# Patient Record
Sex: FEMALE | Race: WHITE | NOT HISPANIC OR LATINO | Employment: OTHER | URBAN - METROPOLITAN AREA
[De-identification: names, ages, dates, MRNs, and addresses within clinical notes are randomized per-mention and may not be internally consistent; named-entity substitution may affect disease eponyms.]

---

## 2017-01-24 RX ORDER — PAROXETINE HYDROCHLORIDE HEMIHYDRATE 25 MG/1
25 TABLET, FILM COATED, EXTENDED RELEASE ORAL EVERY MORNING
Qty: 90 TABLET | Refills: 1 | Status: SHIPPED | OUTPATIENT
Start: 2017-01-24 | End: 2017-02-14 | Stop reason: ALTCHOICE

## 2017-01-24 NOTE — TELEPHONE ENCOUNTER
----- Message from Kelechi Varghese sent at 1/23/2017  1:33 PM CST -----  Contact: Self   Type: Rx    Name of medication(s): paroxetine (PAXIL-CR) 25 MG 24 hr tablet    Is this a refill? New rx? Refill    Who prescribed medication? Dr Carlos    Pharmacy Name, Phone, & Location: [facility]    Comments:Pt is requesting that you please give her the lower Dose, please advice    Thanks

## 2017-01-25 ENCOUNTER — TELEPHONE (OUTPATIENT)
Dept: INTERNAL MEDICINE | Facility: CLINIC | Age: 82
End: 2017-01-25

## 2017-01-25 RX ORDER — PAROXETINE 10 MG/1
10 TABLET, FILM COATED ORAL EVERY MORNING
Qty: 90 TABLET | Refills: 1 | Status: SHIPPED | OUTPATIENT
Start: 2017-01-25 | End: 2017-02-14 | Stop reason: SDUPTHER

## 2017-01-25 NOTE — TELEPHONE ENCOUNTER
----- Message from Tadeo Workmanam sent at 1/25/2017  1:54 PM CST -----  Contact: Self/[phone] cell  Patient is calling to speak with someone in the office regarding her current medications. She states she has been leaving messages for three days with no response. Please call and advise.    Thank you!

## 2017-02-14 ENCOUNTER — OFFICE VISIT (OUTPATIENT)
Dept: INTERNAL MEDICINE | Facility: CLINIC | Age: 82
End: 2017-02-14
Payer: MEDICARE

## 2017-02-14 VITALS
HEART RATE: 62 BPM | BODY MASS INDEX: 23.48 KG/M2 | HEIGHT: 63 IN | DIASTOLIC BLOOD PRESSURE: 74 MMHG | SYSTOLIC BLOOD PRESSURE: 126 MMHG | WEIGHT: 132.5 LBS

## 2017-02-14 DIAGNOSIS — E07.9 THYROID DISORDER: ICD-10-CM

## 2017-02-14 DIAGNOSIS — F41.1 GENERALIZED ANXIETY DISORDER: ICD-10-CM

## 2017-02-14 DIAGNOSIS — Z78.0 POSTMENOPAUSAL STATUS: ICD-10-CM

## 2017-02-14 DIAGNOSIS — Z00.00 ANNUAL PHYSICAL EXAM: Primary | ICD-10-CM

## 2017-02-14 DIAGNOSIS — Z12.31 ENCOUNTER FOR SCREENING MAMMOGRAM FOR MALIGNANT NEOPLASM OF BREAST: ICD-10-CM

## 2017-02-14 DIAGNOSIS — M81.0 OSTEOPOROSIS, POST-MENOPAUSAL: ICD-10-CM

## 2017-02-14 DIAGNOSIS — D64.9 ANEMIA, UNSPECIFIED TYPE: ICD-10-CM

## 2017-02-14 DIAGNOSIS — I10 ESSENTIAL HYPERTENSION: ICD-10-CM

## 2017-02-14 DIAGNOSIS — H25.813 COMBINED FORMS OF AGE-RELATED CATARACT OF BOTH EYES: ICD-10-CM

## 2017-02-14 PROBLEM — H25.819 COMBINED FORM OF SENILE CATARACT: Status: ACTIVE | Noted: 2017-02-14

## 2017-02-14 PROCEDURE — 99397 PER PM REEVAL EST PAT 65+ YR: CPT | Mod: S$GLB,,, | Performed by: INTERNAL MEDICINE

## 2017-02-14 PROCEDURE — 99999 PR PBB SHADOW E&M-EST. PATIENT-LVL III: CPT | Mod: PBBFAC,,, | Performed by: INTERNAL MEDICINE

## 2017-02-14 PROCEDURE — 3074F SYST BP LT 130 MM HG: CPT | Mod: S$GLB,,, | Performed by: INTERNAL MEDICINE

## 2017-02-14 PROCEDURE — 99499 UNLISTED E&M SERVICE: CPT | Mod: S$GLB,,, | Performed by: INTERNAL MEDICINE

## 2017-02-14 PROCEDURE — 3078F DIAST BP <80 MM HG: CPT | Mod: S$GLB,,, | Performed by: INTERNAL MEDICINE

## 2017-02-14 RX ORDER — PAROXETINE 10 MG/1
10 TABLET, FILM COATED ORAL EVERY MORNING
Qty: 90 TABLET | Refills: 1 | Status: SHIPPED | OUTPATIENT
Start: 2017-02-14 | End: 2018-01-09 | Stop reason: ALTCHOICE

## 2017-02-14 NOTE — MR AVS SNAPSHOT
[facility]  [address]  [place] [address]  Phone: [phone]  Fax: [phone]                  Courtney Plummer   2017 2:20 PM   Office Visit    Description:  Female : 1935   Provider:  Courtney Carlos MD   Department:  [facility]           Reason for Visit     Follow-up           Diagnoses this Visit        Comments    Annual physical exam    -  Primary     Generalized anxiety disorder         Essential hypertension         Osteoporosis, post-menopausal         Encounter for screening mammogram for malignant neoplasm of breast         Combined forms of age-related cataract of both eyes         Postmenopausal status         Anemia, unspecified type         Thyroid disorder                To Do List           Goals (5 Years of Data)     None      Follow-Up and Disposition     Return in about 6 months (around 2017) for also one year PE.       These Medications        Disp Refills Start End    paroxetine (PAXIL) 10 MG tablet 90 tablet 1 2017    Take 1 tablet (10 mg total) by mouth every morning. - Oral    Pharmacy: [facility] - [place], [place] - [address] [place] [facility] #: [phone]         [facility] Call     [facility] On Call Nurse Care Line -  Assistance  Registered nurses in the [facility] On Call Center provide clinical advisement, health education, appointment booking, and other advisory services.  Call for this free service at [phone].             Medications           Message regarding Medications     Verify the changes and/or additions to your medication regime listed below are the same as discussed with your clinician today.  If any of these changes or additions are incorrect, please notify your healthcare provider.        STOP taking these medications     paroxetine (PAXIL-CR) 25 MG 24 hr tablet Take 1 tablet (25 mg total) by mouth every morning.           Verify that the below list of medications is  "an accurate representation of the medications you are currently taking.  If none reported, the list may be blank. If incorrect, please contact your healthcare provider. Carry this list with you in case of emergency.           Current Medications     BIOTIN ORAL Take by mouth.    multivitamin (ONE DAILY MULTIVITAMIN) per tablet Take 1 tablet by mouth once daily. Takes 3x weekly    OMEGA-3S/DHA/EPA/FISH OIL/D3 (VITAMIN-D + OMEGA-3 ORAL) Take 1 tablet by mouth once daily.    paroxetine (PAXIL) 10 MG tablet Take 1 tablet (10 mg total) by mouth every morning.    potassium 99 mg Tab Take 1 tablet by mouth once daily.    triamterene-hydrochlorothiazide 37.5-25 mg (DYAZIDE) 37.5-25 mg per capsule Take 1 capsule by mouth once daily. 1 Capsule Oral Every morning    alprazolam (XANAX) 0.25 MG tablet Take 1 tablet (0.25 mg total) by mouth nightly as needed for Insomnia.    b complex vitamins tablet Take 1 tablet by mouth once daily.           Clinical Reference Information           Your Vitals Were     BP Pulse Height Weight BMI    126/74 62 5' 3" (1.6 m) 60.1 kg (132 lb 7.9 oz) 23.47 kg/m2      Blood Pressure          Most Recent Value    BP  126/74      Allergies as of 2/14/2017     Sulfa (Sulfonamide Antibiotics)    Codeine      Immunizations Administered on Date of Encounter - 2/14/2017     None      Orders Placed During Today's Visit      Normal Orders This Visit    Ambulatory referral to Ophthalmology     Future Labs/Procedures Expected by Expires    CBC auto differential  2/14/2017 2/14/2018    Comprehensive metabolic panel  2/14/2017 4/15/2018    DXA Bone Density Spine And Hip_Axial Skeleton  2/14/2017 5/15/2017    Lipid panel  2/14/2017 4/15/2018    Mammo Digital Screening Bilat with CAD  2/14/2017 4/16/2018    TSH  2/14/2017 4/15/2018      Language Assistance Services     ATTENTION: Language assistance services are available, free of charge. Please call [phone].      ATENCIÓN: leah Ngo " disposición servicios gratuitos de asistencia lingüística. Laineyahir al [phone].     SHAKIRA Ý: N?u b?n nói Ti?ng Vi?t, có các d?ch v? h? tr? ngôn ng? mi?n phí dành cho b?n. G?i s? [phone].         [facility] complies with applicable Federal civil rights laws and does not discriminate on the basis of race, color, national origin, age, disability, or sex.

## 2017-02-15 NOTE — PROGRESS NOTES
Subjective:       Patient ID: Courtney Plummer is a 81 y.o. female.    Chief Complaint: Follow-up  Wellness  Entire chart reviewed, PMx, FHx, and Social History updated and reviewed.    HPI  Review of Systems   Constitutional: Negative for activity change, appetite change, chills, fatigue, fever and unexpected weight change.   HENT: Negative for dental problem, hearing loss, tinnitus, trouble swallowing and voice change.    Eyes: Negative for visual disturbance.   Respiratory: Negative for cough, chest tightness, shortness of breath and wheezing.    Cardiovascular: Negative for chest pain, palpitations and leg swelling.   Gastrointestinal: Negative for abdominal pain, blood in stool, constipation, diarrhea and nausea.   Genitourinary: Negative for difficulty urinating, dysuria, frequency and urgency.   Musculoskeletal: Negative for arthralgias, back pain, gait problem, joint swelling, myalgias, neck pain and neck stiffness.   Skin: Negative for rash.   Neurological: Negative for dizziness, tremors, speech difficulty, weakness, light-headedness and headaches.   Psychiatric/Behavioral: Negative for dysphoric mood and sleep disturbance. The patient is not nervous/anxious.        Objective:      Physical Exam   Constitutional: She appears well-nourished.   HENT:   Head: Atraumatic.   Eyes: Conjunctivae are normal. No scleral icterus.   Neck: Neck supple.   Cardiovascular: Normal rate and regular rhythm.    Pulmonary/Chest: Effort normal and breath sounds normal.   Abdominal: Soft. There is no tenderness.   Musculoskeletal: She exhibits no edema.   Lymphadenopathy:     She has no cervical adenopathy.   Neurological: She is alert.   Skin: Skin is warm and dry.   Psychiatric: She has a normal mood and affect. Her behavior is normal.       Assessment:       1. Annual physical exam    2. Generalized anxiety disorder    3. Essential hypertension    4. Osteoporosis, post-menopausal    5. Encounter for screening mammogram for  malignant neoplasm of breast    6. Combined forms of age-related cataract of both eyes    7. Postmenopausal status    8. Anemia, unspecified type    9. Thyroid disorder, nodule removed 1969.        Plan:   Courtney was seen today for follow-up.    Diagnoses and all orders for this visit:    Annual physical exam    Generalized anxiety disorder.  Doing well on Paxil 10 mg once daily.    Essential hypertension  Good control.  Continue to monitor.  -     Comprehensive metabolic panel; Future  -     Lipid panel; Future    Osteoporosis, post-menopausal  -     DXA Bone Density Spine And Hip_Axial Skeleton; Future    Encounter for screening mammogram for malignant neoplasm of breast  -     Mammo Digital Screening Bilat with CAD; Future    Combined forms of age-related cataract of both eyes.  She was told by her optometrist that it is time to have cataract surgery.  She has cataracts in both eyes that are impairing her vision.  -     Ambulatory referral to Ophthalmology    Postmenopausal status  -     DXA Bone Density Spine And Hip_Axial Skeleton; Future    Anemia, unspecified type  -     CBC auto differential; Future    Thyroid disorder, nodule removed 1969.  -     TSH; Future    Other orders.  She is going to try to get some regular physical activity into her life.  She has not been doing any regular exercise.  -     paroxetine (PAXIL) 10 MG tablet; Take 1 tablet (10 mg total) by mouth every morning.

## 2017-02-17 ENCOUNTER — HOSPITAL ENCOUNTER (OUTPATIENT)
Dept: RADIOLOGY | Facility: HOSPITAL | Age: 82
Discharge: HOME | End: 2017-02-17
Attending: INTERNAL MEDICINE
Payer: MEDICARE

## 2017-02-17 DIAGNOSIS — Z12.31 ENCOUNTER FOR SCREENING MAMMOGRAM FOR MALIGNANT NEOPLASM OF BREAST: ICD-10-CM

## 2017-02-17 PROCEDURE — 77067 SCR MAMMO BI INCL CAD: CPT | Mod: 26,,, | Performed by: RADIOLOGY

## 2017-02-17 PROCEDURE — 77067 SCR MAMMO BI INCL CAD: CPT | Mod: TC

## 2017-03-14 ENCOUNTER — HOSPITAL ENCOUNTER (OUTPATIENT)
Dept: RADIOLOGY | Facility: CLINIC | Age: 82
Discharge: HOME | End: 2017-03-14
Attending: INTERNAL MEDICINE
Payer: MEDICARE

## 2017-03-14 DIAGNOSIS — Z78.0 POSTMENOPAUSAL STATUS: ICD-10-CM

## 2017-03-14 DIAGNOSIS — M81.0 OSTEOPOROSIS, POST-MENOPAUSAL: ICD-10-CM

## 2017-03-14 PROCEDURE — 77080 DXA BONE DENSITY AXIAL: CPT | Mod: TC

## 2017-03-14 PROCEDURE — 77080 DXA BONE DENSITY AXIAL: CPT | Mod: 26,,, | Performed by: INTERNAL MEDICINE

## 2017-03-17 ENCOUNTER — TELEPHONE (OUTPATIENT)
Dept: INTERNAL MEDICINE | Facility: CLINIC | Age: 82
End: 2017-03-17

## 2017-03-17 NOTE — TELEPHONE ENCOUNTER
Please let the patient know that you faxed her pre-op for cataract surgery. Thank you very much.

## 2017-10-02 ENCOUNTER — OFFICE VISIT (OUTPATIENT)
Dept: URGENT CARE | Facility: CLINIC | Age: 82
End: 2017-10-02
Payer: MEDICARE

## 2017-10-02 VITALS
HEIGHT: 63 IN | BODY MASS INDEX: 23.39 KG/M2 | SYSTOLIC BLOOD PRESSURE: 159 MMHG | RESPIRATION RATE: 18 BRPM | TEMPERATURE: 99 F | OXYGEN SATURATION: 97 % | WEIGHT: 132 LBS | DIASTOLIC BLOOD PRESSURE: 77 MMHG | HEART RATE: 84 BPM

## 2017-10-02 DIAGNOSIS — J01.90 ACUTE BACTERIAL SINUSITIS: Primary | ICD-10-CM

## 2017-10-02 DIAGNOSIS — B96.89 ACUTE BACTERIAL SINUSITIS: Primary | ICD-10-CM

## 2017-10-02 PROCEDURE — 96372 THER/PROPH/DIAG INJ SC/IM: CPT | Mod: S$GLB,,, | Performed by: INTERNAL MEDICINE

## 2017-10-02 PROCEDURE — 99213 OFFICE O/P EST LOW 20 MIN: CPT | Mod: 25,S$GLB,, | Performed by: INTERNAL MEDICINE

## 2017-10-02 PROCEDURE — 1159F MED LIST DOCD IN RCRD: CPT | Mod: S$GLB,,, | Performed by: INTERNAL MEDICINE

## 2017-10-02 PROCEDURE — 3008F BODY MASS INDEX DOCD: CPT | Mod: S$GLB,,, | Performed by: INTERNAL MEDICINE

## 2017-10-02 PROCEDURE — 3077F SYST BP >= 140 MM HG: CPT | Mod: S$GLB,,, | Performed by: INTERNAL MEDICINE

## 2017-10-02 PROCEDURE — 3078F DIAST BP <80 MM HG: CPT | Mod: S$GLB,,, | Performed by: INTERNAL MEDICINE

## 2017-10-02 RX ORDER — AZITHROMYCIN 250 MG/1
TABLET, FILM COATED ORAL
Qty: 6 TABLET | Refills: 0 | Status: SHIPPED | OUTPATIENT
Start: 2017-10-02 | End: 2017-10-08

## 2017-10-02 RX ORDER — BETAMETHASONE SODIUM PHOSPHATE AND BETAMETHASONE ACETATE 3; 3 MG/ML; MG/ML
9 INJECTION, SUSPENSION INTRA-ARTICULAR; INTRALESIONAL; INTRAMUSCULAR; SOFT TISSUE ONCE
Status: COMPLETED | OUTPATIENT
Start: 2017-10-02 | End: 2017-10-02

## 2017-10-02 RX ADMIN — BETAMETHASONE SODIUM PHOSPHATE AND BETAMETHASONE ACETATE 9 MG: 3; 3 INJECTION, SUSPENSION INTRA-ARTICULAR; INTRALESIONAL; INTRAMUSCULAR; SOFT TISSUE at 03:10

## 2017-10-02 NOTE — PROGRESS NOTES
"Subjective:       Patient ID: Courtney Plummer is a 82 y.o. female.    Vitals:  height is 5' 3" (1.6 m) and weight is 59.9 kg (132 lb). Her temperature is 98.7 °F (37.1 °C). Her blood pressure is 159/77 (abnormal) and her pulse is 84. Her respiration is 18 and oxygen saturation is 97%.     Chief Complaint: Cough and Sinus Problem    Cough   This is a new problem. The current episode started in the past 7 days. The problem has been gradually worsening. The problem occurs every few minutes. The cough is productive of sputum. Associated symptoms include myalgias, nasal congestion, postnasal drip and a sore throat. Pertinent negatives include no chest pain, chills, ear pain, eye redness, fever, headaches, shortness of breath or wheezing. Nothing aggravates the symptoms. She has tried OTC cough suppressant for the symptoms. The treatment provided mild relief.   Sinus Problem   This is a new problem. The current episode started in the past 7 days. The problem has been gradually worsening since onset. There has been no fever. The pain is mild. Associated symptoms include congestion, coughing, sinus pressure, sneezing and a sore throat. Pertinent negatives include no chills, ear pain, headaches, hoarse voice or shortness of breath. Past treatments include nothing.     Review of Systems   Constitution: Positive for malaise/fatigue. Negative for chills and fever.   HENT: Positive for congestion, postnasal drip, sinus pressure, sneezing and sore throat. Negative for ear pain and hoarse voice.    Eyes: Negative for discharge and redness.   Cardiovascular: Negative for chest pain, dyspnea on exertion and leg swelling.   Respiratory: Positive for cough and sputum production. Negative for shortness of breath and wheezing.    Musculoskeletal: Positive for myalgias.   Gastrointestinal: Negative for abdominal pain and nausea.   Neurological: Negative for headaches.       Objective:      Physical Exam   Constitutional: She is " oriented to person, place, and time. She appears well-developed and well-nourished.   HENT:   Head: Normocephalic and atraumatic.   Nose: Mucosal edema (mucosal erythema with purulent drainage) present.   Mouth/Throat: Posterior oropharyngeal edema and posterior oropharyngeal erythema present.   Eyes: Conjunctivae and EOM are normal. Pupils are equal, round, and reactive to light.   Neck: Normal range of motion. Neck supple.   Cardiovascular: Normal rate and regular rhythm.    Pulmonary/Chest: Effort normal.   Upper airway congestion   Neurological: She is alert and oriented to person, place, and time.   Skin: Skin is warm and dry.       Assessment:       1. Acute bacterial sinusitis        Plan:         Acute bacterial sinusitis  -     betamethasone acetate-betamethasone sodium phosphate injection 9 mg; Inject 1.5 mLs (9 mg total) into the muscle once.  -     azithromycin (ZITHROMAX Z-PHILIPPE) 250 MG tablet; Take 2 tablets (500 mg) on  Day 1,  followed by 1 tablet (250 mg) once daily on Days 2 through 5.  Dispense: 6 tablet; Refill: 0

## 2017-10-08 ENCOUNTER — OFFICE VISIT (OUTPATIENT)
Dept: URGENT CARE | Facility: CLINIC | Age: 82
End: 2017-10-08
Payer: MEDICARE

## 2017-10-08 ENCOUNTER — HOSPITAL ENCOUNTER (EMERGENCY)
Facility: HOSPITAL | Age: 82
Discharge: HOME | End: 2017-10-08
Attending: EMERGENCY MEDICINE
Payer: MEDICARE

## 2017-10-08 VITALS
SYSTOLIC BLOOD PRESSURE: 145 MMHG | WEIGHT: 128 LBS | TEMPERATURE: 98 F | OXYGEN SATURATION: 97 % | HEART RATE: 80 BPM | RESPIRATION RATE: 18 BRPM | BODY MASS INDEX: 22.68 KG/M2 | HEIGHT: 63 IN | DIASTOLIC BLOOD PRESSURE: 69 MMHG

## 2017-10-08 VITALS
HEART RATE: 86 BPM | DIASTOLIC BLOOD PRESSURE: 68 MMHG | SYSTOLIC BLOOD PRESSURE: 121 MMHG | HEIGHT: 63 IN | OXYGEN SATURATION: 98 % | BODY MASS INDEX: 22.68 KG/M2 | WEIGHT: 128 LBS | TEMPERATURE: 98 F

## 2017-10-08 DIAGNOSIS — J06.9 URI (UPPER RESPIRATORY INFECTION): ICD-10-CM

## 2017-10-08 DIAGNOSIS — E87.6 HYPOKALEMIA: Primary | ICD-10-CM

## 2017-10-08 DIAGNOSIS — R40.20 LOSS OF CONSCIOUSNESS: ICD-10-CM

## 2017-10-08 DIAGNOSIS — R55 SYNCOPE: ICD-10-CM

## 2017-10-08 DIAGNOSIS — R53.1 WEAKNESS: ICD-10-CM

## 2017-10-08 DIAGNOSIS — R55 SYNCOPE, UNSPECIFIED SYNCOPE TYPE: Primary | ICD-10-CM

## 2017-10-08 LAB
ALBUMIN SERPL BCP-MCNC: 3.9 G/DL
ALP SERPL-CCNC: 62 U/L
ALT SERPL W/O P-5'-P-CCNC: 18 U/L
ANION GAP SERPL CALC-SCNC: 11 MMOL/L
AST SERPL-CCNC: 19 U/L
BASOPHILS # BLD AUTO: 0.02 K/UL
BASOPHILS NFR BLD: 0.2 %
BILIRUB SERPL-MCNC: 0.7 MG/DL
BILIRUB UR QL STRIP: NEGATIVE
BUN SERPL-MCNC: 13 MG/DL
CALCIUM SERPL-MCNC: 9.7 MG/DL
CHLORIDE SERPL-SCNC: 94 MMOL/L
CLARITY UR REFRACT.AUTO: ABNORMAL
CO2 SERPL-SCNC: 26 MMOL/L
COLOR UR AUTO: YELLOW
CREAT SERPL-MCNC: 0.8 MG/DL
DIFFERENTIAL METHOD: ABNORMAL
EOSINOPHIL # BLD AUTO: 0.1 K/UL
EOSINOPHIL NFR BLD: 1.2 %
ERYTHROCYTE [DISTWIDTH] IN BLOOD BY AUTOMATED COUNT: 16.2 %
EST. GFR  (AFRICAN AMERICAN): >60 ML/MIN/1.73 M^2
EST. GFR  (NON AFRICAN AMERICAN): >60 ML/MIN/1.73 M^2
GLUCOSE SERPL-MCNC: 97 MG/DL
GLUCOSE UR QL STRIP: NEGATIVE
HCT VFR BLD AUTO: 37.1 %
HGB BLD-MCNC: 12.7 G/DL
HGB UR QL STRIP: NEGATIVE
KETONES UR QL STRIP: NEGATIVE
LEUKOCYTE ESTERASE UR QL STRIP: NEGATIVE
LIPASE SERPL-CCNC: 20 U/L
LYMPHOCYTES # BLD AUTO: 2.6 K/UL
LYMPHOCYTES NFR BLD: 32.6 %
MCH RBC QN AUTO: 28.5 PG
MCHC RBC AUTO-ENTMCNC: 34.2 G/DL
MCV RBC AUTO: 83 FL
MONOCYTES # BLD AUTO: 1.3 K/UL
MONOCYTES NFR BLD: 15.6 %
NEUTROPHILS # BLD AUTO: 4 K/UL
NEUTROPHILS NFR BLD: 50.4 %
NITRITE UR QL STRIP: NEGATIVE
PH UR STRIP: 8 [PH] (ref 5–8)
PLATELET # BLD AUTO: 221 K/UL
PMV BLD AUTO: 10 FL
POTASSIUM SERPL-SCNC: 2.9 MMOL/L
PROT SERPL-MCNC: 6.8 G/DL
PROT UR QL STRIP: NEGATIVE
RBC # BLD AUTO: 4.46 M/UL
SODIUM SERPL-SCNC: 131 MMOL/L
SP GR UR STRIP: 1.01 (ref 1–1.03)
URN SPEC COLLECT METH UR: ABNORMAL
UROBILINOGEN UR STRIP-ACNC: NEGATIVE EU/DL
WBC # BLD AUTO: 8.09 K/UL

## 2017-10-08 PROCEDURE — 25000003 PHARM REV CODE 250: Performed by: EMERGENCY MEDICINE

## 2017-10-08 PROCEDURE — 85025 COMPLETE CBC W/AUTO DIFF WBC: CPT

## 2017-10-08 PROCEDURE — 99284 EMERGENCY DEPT VISIT MOD MDM: CPT | Mod: 25

## 2017-10-08 PROCEDURE — 80053 COMPREHEN METABOLIC PANEL: CPT

## 2017-10-08 PROCEDURE — 96360 HYDRATION IV INFUSION INIT: CPT

## 2017-10-08 PROCEDURE — 93010 ELECTROCARDIOGRAM REPORT: CPT | Mod: ,,, | Performed by: INTERNAL MEDICINE

## 2017-10-08 PROCEDURE — 99285 EMERGENCY DEPT VISIT HI MDM: CPT | Mod: ,,, | Performed by: EMERGENCY MEDICINE

## 2017-10-08 PROCEDURE — 93005 ELECTROCARDIOGRAM TRACING: CPT

## 2017-10-08 PROCEDURE — 83690 ASSAY OF LIPASE: CPT

## 2017-10-08 PROCEDURE — 81003 URINALYSIS AUTO W/O SCOPE: CPT

## 2017-10-08 PROCEDURE — 99213 OFFICE O/P EST LOW 20 MIN: CPT | Mod: S$GLB,,, | Performed by: INTERNAL MEDICINE

## 2017-10-08 RX ADMIN — POTASSIUM BICARBONATE 50 MEQ: 25 TABLET, EFFERVESCENT ORAL at 05:10

## 2017-10-08 RX ADMIN — SODIUM CHLORIDE 1000 ML: 0.9 INJECTION, SOLUTION INTRAVENOUS at 04:10

## 2017-10-08 NOTE — ED TRIAGE NOTES
Comes to the ED for c/o chest congestion for 1 week.  Was given and ABX.    Now with nasal congestion, decreased appetite, nausea. Productive cough,  Diarrhea. And pass out while in the bathroom.  Did not hit her head.  Reported being disoriented this am.

## 2017-10-08 NOTE — ED PROVIDER NOTES
Encounter Date: 10/8/2017    SCRIBE #1 NOTE: I, Jayjay Kilgore, am scribing for, and in the presence of, Richard Noguera MD.       History     Chief Complaint   Patient presents with    Chest Congestion     sent from urgent care for chest congestion and further work up. pt reports decreased appetite and weakness. states she passed out last night on the toilet     Time seen by provider: 4:12 PM    This is a 82 y.o. female with history of hypertension on Dyazide, anxiety, who presents with complaint of chest congestion x 1 week. Patient notes Urgent Care visit 1 week ago for chest congestion and sore throat and being given steroid injection, Mucinex, and Azithromycin. She states her symptoms resolved by the next day, finishing her course of antibiotics 4 days ago, but had resumption of chest congestion 3 days ago. Patient also complains of nausea, diarrhea, anorexia that started 3 days ago. She reports an episode of diarrhea last night after which she felt suddenly weak and synopsized and thinks that she hurt her right ankle. Patient denies head injury. She says that she then got up a few seconds afterwards, laid on the sofa for a few minutes, woke up again feeling disoriented and then went back to sleep in her bed upstairs. She returned to Urgent Care earlier today and was sent to the ED for further evaluation.    Patient states that she has otherwise been sleeping well. She denies fever, chills, chest pain, SOB. Additionally patient mentions taking 10 mg Paxil QD, history of multiple back surgeries, history of neuropathy (baseline paresthesias from toes up to waist bilaterally and stable for the past several months) for which she is not medicated.           Review of patient's allergies indicates:   Allergen Reactions    Sulfa (sulfonamide antibiotics) Anaphylaxis    Codeine Anxiety     Past Medical History:   Diagnosis Date    Anxiety     Hypertension      Past Surgical History:   Procedure Laterality Date    BACK  SURGERY      x3     SECTION, CLASSIC      SPINE SURGERY      THYROID SURGERY       Family History   Problem Relation Age of Onset    Heart disease Father     Heart disease Brother     Cancer Brother      melanoma    No Known Problems Daughter     No Known Problems Son     No Known Problems Daughter     No Known Problems Daughter     No Known Problems Son     No Known Problems Son      Social History   Substance Use Topics    Smoking status: Former Smoker     Packs/day: 1.00     Quit date: 1969    Smokeless tobacco: Never Used    Alcohol use No      Comment: occasionally     Review of Systems   Constitutional: Positive for appetite change and fatigue. Negative for chills and fever.   HENT: Negative for sore throat.    Respiratory: Negative for cough and shortness of breath.         Positive for chest congestion.    Cardiovascular: Negative for chest pain.   Gastrointestinal: Positive for diarrhea and nausea.   Genitourinary: Negative for dysuria.   Musculoskeletal: Negative for back pain.   Skin: Negative for rash.   Neurological: Positive for syncope. Negative for weakness.   Hematological: Does not bruise/bleed easily.       Physical Exam     Initial Vitals [10/08/17 1454]   BP Pulse Resp Temp SpO2   (!) 145/69 80 18 98.3 °F (36.8 °C) 97 %      MAP       94.33         Physical Exam    Nursing note and vitals reviewed.  Constitutional: She appears well-developed and well-nourished.  Non-toxic appearance. She does not appear ill. No distress.   Patient appears slightly tired.    HENT:   Head: Normocephalic.   Mouth/Throat: Oropharynx is clear and moist.   Eyes: Conjunctivae are normal.   Neck: Neck supple.   Cardiovascular: Normal rate, regular rhythm and intact distal pulses.   Pulmonary/Chest: Breath sounds normal. No respiratory distress. She has no wheezes. She has no rhonchi. She has no rales.   Abdominal: Soft. There is no tenderness.   Musculoskeletal: Normal range of motion.    Neurological: She is alert and oriented to person, place, and time. She has normal strength.   Skin: Skin is warm and dry.   Psychiatric: Thought content normal.         ED Course   Procedures  Labs Reviewed   CBC W/ AUTO DIFFERENTIAL - Abnormal; Notable for the following:        Result Value    RDW 16.2 (*)     Mono # 1.3 (*)     Mono% 15.6 (*)     All other components within normal limits   COMPREHENSIVE METABOLIC PANEL - Abnormal; Notable for the following:     Sodium 131 (*)     Potassium 2.9 (*)     Chloride 94 (*)     All other components within normal limits   URINALYSIS, REFLEX TO URINE CULTURE - Abnormal; Notable for the following:     Appearance, UA Hazy (*)     All other components within normal limits    Narrative:     Preferred Collection Type->Urine, Clean Catch   LIPASE             Medical Decision Making:   History:   Old Medical Records: I decided to obtain old medical records.  Initial Assessment:   82 y.o. female with URI symptoms that began 1 week ago and briefly resolved but have returned. She also complains of stomach issues with anorexia and diarrhea that began shortly after completing her course of Azithromax. Last night around bed time, she had an episode of diarrhea and afterwards felt suddenly weak and passed out.Regarding her syncope, she is almost 24 hours from the event, therefore, I do not feel observation for cardiac syncope is warranted especially given history of occurrence after diarrhea which suggests reflex syncope compounded by dehydration.   Patient is also complaining of chest congestion which antibiotics have not really helped. Will give fluids, and check labs and chest x-ray. If that all looks good, then will anticipate discharge.     I have noted the Urgent Care doctor's note from today, who felt that the patient looked ill; but I disagree with his assessment.   Clinical Tests:   Medical Tests: Reviewed and Ordered            Scribe Attestation:   Scribe #1: I performed  the above scribed service and the documentation accurately describes the services I performed. I attest to the accuracy of the note.            ED Course      Clinical Impression:   The primary encounter diagnosis was Hypokalemia. Diagnoses of Loss of consciousness, Syncope, and URI (upper respiratory infection) were also pertinent to this visit.                           Richard Noguera MD  10/10/17 6161

## 2017-10-08 NOTE — ED NOTES
LOC: The patient is awake, alert, and oriented to place, time, situation. Affect is appropriate.  Speech is appropriate and clear.     APPEARANCE: Patient resting comfortably in no acute distress.  Patient is clean and well groomed.    SKIN: The skin is warm and dry; color consistent with ethnicity.  Patient has normal skin turgor and moist mucus membranes.  Skin intact; no breakdown or bruising noted.     MUSCULOSKELETAL: Patient moving upper and lower extremities without difficulty. Reports feeling weak.    RESPIRATORY: Airway is open and patent. Respirations spontaneous, even, easy, and non-labored.  Patient has a normal effort and rate.  No accessory muscle use noted. Productive  Cough, yellow.BS clear.     CARDIAC: .  No peripheral edema noted. No complaints of chest pain.      ABDOMEN: Soft and non tender to palpation.  No distention noted. Report nausea with eating, decreased appetite. And diarrhea.    NEUROLOGIC: Eyes open spontaneously.  Behavior appropriate to situation.  Follows commands; facial expression symmetrical.  Purposeful motor response noted; normal sensation in all extremities.

## 2017-10-08 NOTE — PROGRESS NOTES
"Subjective:       Patient ID: Courtney Plummer is a 82 y.o. female.    Vitals:  height is 5' 3" (1.6 m) and weight is 58.1 kg (128 lb). Her temperature is 98.4 °F (36.9 °C). Her blood pressure is 121/68 and her pulse is 86. Her oxygen saturation is 98%.     Chief Complaint: Cough    Cough   This is a recurrent (congestion) problem. The current episode started in the past 7 days. The problem has been gradually worsening. The problem occurs constantly. Associated symptoms comments: Chest congestion. The symptoms are aggravated by lying down.     Review of Systems   Respiratory: Positive for cough.    Gastrointestinal: Positive for abdominal pain and diarrhea.        Patient passed out on the toilet last night. Not sure what she hit. Rt. Ankle tenderness.        Objective:      Physical Exam   Constitutional: She is oriented to person, place, and time. She appears well-developed and well-nourished. She appears listless. She has a sickly appearance. She appears ill.   HENT:   Head: Normocephalic and atraumatic.   Eyes: Conjunctivae and EOM are normal. Pupils are equal, round, and reactive to light.   Neck: Normal range of motion. Neck supple.   Cardiovascular: Normal rate and regular rhythm.    Pulmonary/Chest: Effort normal and breath sounds normal.   Abdominal: Soft. Bowel sounds are normal.   Musculoskeletal: Normal range of motion.   Neurological: She is oriented to person, place, and time. She appears listless.   Skin: Skin is warm and dry.       Assessment:       1. Syncope, unspecified syncope type    2. Weakness        Plan:         Syncope, unspecified syncope type    Weakness    advised needs to go to Er. Declined to have family or friend come get her. Declined ambulance.stated she would go home near here and call kids to bring her to   ER      "

## 2018-01-08 PROBLEM — R55 SYNCOPE: Status: ACTIVE | Noted: 2018-01-08

## 2018-01-08 PROBLEM — E87.1 HYPONATREMIA: Status: ACTIVE | Noted: 2018-01-08

## 2018-01-08 PROBLEM — I10 ESSENTIAL HYPERTENSION: Status: ACTIVE | Noted: 2018-01-08

## 2018-01-09 ENCOUNTER — OFFICE VISIT (OUTPATIENT)
Dept: INTERNAL MEDICINE | Facility: CLINIC | Age: 83
End: 2018-01-09
Payer: MEDICARE

## 2018-01-09 VITALS
BODY MASS INDEX: 24.14 KG/M2 | HEIGHT: 62 IN | DIASTOLIC BLOOD PRESSURE: 72 MMHG | WEIGHT: 131.19 LBS | SYSTOLIC BLOOD PRESSURE: 134 MMHG | HEART RATE: 74 BPM

## 2018-01-09 DIAGNOSIS — M25.552 LEFT HIP PAIN: ICD-10-CM

## 2018-01-09 DIAGNOSIS — Z23 NEEDS FLU SHOT: ICD-10-CM

## 2018-01-09 DIAGNOSIS — Z01.419 ENCOUNTER FOR GYNECOLOGICAL EXAMINATION WITHOUT ABNORMAL FINDING: ICD-10-CM

## 2018-01-09 DIAGNOSIS — I10 ESSENTIAL HYPERTENSION: ICD-10-CM

## 2018-01-09 DIAGNOSIS — F41.1 GENERALIZED ANXIETY DISORDER: ICD-10-CM

## 2018-01-09 DIAGNOSIS — R55 SYNCOPE, UNSPECIFIED SYNCOPE TYPE: ICD-10-CM

## 2018-01-09 DIAGNOSIS — K80.20 ASYMPTOMATIC GALLSTONES: ICD-10-CM

## 2018-01-09 DIAGNOSIS — Z87.81 HISTORY OF VERTEBRAL COMPRESSION FRACTURE: ICD-10-CM

## 2018-01-09 DIAGNOSIS — Z23 NEED FOR VACCINATION AGAINST STREPTOCOCCUS PNEUMONIAE USING PNEUMOCOCCAL CONJUGATE VACCINE 13: ICD-10-CM

## 2018-01-09 DIAGNOSIS — Z00.00 ANNUAL PHYSICAL EXAM: Primary | ICD-10-CM

## 2018-01-09 DIAGNOSIS — E07.9 THYROID DISORDER: ICD-10-CM

## 2018-01-09 DIAGNOSIS — M81.0 OSTEOPOROSIS, POST-MENOPAUSAL: ICD-10-CM

## 2018-01-09 DIAGNOSIS — N20.0 KIDNEY STONES: ICD-10-CM

## 2018-01-09 DIAGNOSIS — Z23 NEED FOR VACCINATION WITH 13-POLYVALENT PNEUMOCOCCAL CONJUGATE VACCINE: ICD-10-CM

## 2018-01-09 DIAGNOSIS — F41.0 PANIC ATTACKS: ICD-10-CM

## 2018-01-09 DIAGNOSIS — E87.1 HYPONATREMIA: ICD-10-CM

## 2018-01-09 PROCEDURE — 99397 PER PM REEVAL EST PAT 65+ YR: CPT | Mod: S$GLB,,, | Performed by: INTERNAL MEDICINE

## 2018-01-09 PROCEDURE — 90670 PCV13 VACCINE IM: CPT | Mod: S$GLB,,, | Performed by: INTERNAL MEDICINE

## 2018-01-09 PROCEDURE — 99999 PR PBB SHADOW E&M-EST. PATIENT-LVL IV: CPT | Mod: PBBFAC,,, | Performed by: INTERNAL MEDICINE

## 2018-01-09 PROCEDURE — G0009 ADMIN PNEUMOCOCCAL VACCINE: HCPCS | Mod: S$GLB,,, | Performed by: INTERNAL MEDICINE

## 2018-01-09 PROCEDURE — 99499 UNLISTED E&M SERVICE: CPT | Mod: S$GLB,,, | Performed by: INTERNAL MEDICINE

## 2018-01-09 RX ORDER — TRIAMTERENE AND HYDROCHLOROTHIAZIDE 37.5; 25 MG/1; MG/1
1 CAPSULE ORAL DAILY
Qty: 90 CAPSULE | Refills: 4 | Status: SHIPPED | OUTPATIENT
Start: 2018-01-09 | End: 2019-02-20 | Stop reason: SDUPTHER

## 2018-01-09 RX ORDER — ESCITALOPRAM OXALATE 5 MG/1
5 TABLET ORAL EVERY MORNING
Qty: 90 TABLET | Refills: 3 | Status: SHIPPED | OUTPATIENT
Start: 2018-01-09 | End: 2018-08-27

## 2018-01-14 NOTE — PROGRESS NOTES
Subjective:       Patient ID: Courtney Plummer is a 82 y.o. female.    Chief Complaint: Hospital Follow Up  wellness  Entire chart reviewed, PMx, FHx, and Social History updated and reviewed.  Dating ex  Cautiously optimistic about better relationship  Reconciliation may be too far  Happy and safe at [address] in her own townhouse  HPI  Review of Systems   Constitutional: Negative for activity change, appetite change, chills, fatigue, fever and unexpected weight change.   HENT: Positive for hearing loss. Negative for dental problem, rhinorrhea, tinnitus, trouble swallowing and voice change.    Eyes: Positive for visual disturbance. Negative for discharge.   Respiratory: Negative for cough, chest tightness, shortness of breath and wheezing.    Cardiovascular: Negative for chest pain, palpitations and leg swelling.   Gastrointestinal: Negative for abdominal pain, blood in stool, constipation, diarrhea, nausea and vomiting.   Endocrine: Negative for polydipsia and polyuria.   Genitourinary: Negative for difficulty urinating, dysuria, frequency, hematuria, menstrual problem and urgency.   Musculoskeletal: Negative for arthralgias, back pain, gait problem, joint swelling, myalgias, neck pain and neck stiffness.   Skin: Negative for rash.   Neurological: Negative for dizziness, tremors, speech difficulty, weakness, light-headedness and headaches.   Psychiatric/Behavioral: Negative for confusion, dysphoric mood and sleep disturbance. The patient is not nervous/anxious.        Objective:      Physical Exam   Constitutional: She is oriented to person, place, and time. She appears well-developed and well-nourished. No distress.   HENT:   Head: Normocephalic and atraumatic.   Right Ear: External ear normal.   Left Ear: External ear normal.   Nose: Nose normal.   Mouth/Throat: Oropharynx is clear and moist. No oropharyngeal exudate.   Eyes: Conjunctivae and EOM are normal. Pupils are equal, round, and reactive  to light. Right eye exhibits no discharge. No scleral icterus.   Neck: Normal range of motion and full passive range of motion without pain. Neck supple. No spinous process tenderness and no muscular tenderness present. Carotid bruit is not present. No thyromegaly present.   Cardiovascular: Normal rate, regular rhythm, S1 normal, S2 normal, normal heart sounds and intact distal pulses.  Exam reveals no gallop and no friction rub.    No murmur heard.  Pulmonary/Chest: Effort normal and breath sounds normal. No respiratory distress. She has no wheezes. She has no rales. She exhibits no tenderness.   Abdominal: Soft. Bowel sounds are normal. She exhibits no distension and no mass. There is no tenderness. There is no rebound and no guarding.   Genitourinary: Pelvic exam was performed with patient supine. Uterus is not deviated, not enlarged, not fixed and not tender. Cervix exhibits no motion tenderness, no discharge and no friability. Right adnexum displays no mass, no tenderness and no fullness. Left adnexum displays no mass, no tenderness and no fullness.   Musculoskeletal: Normal range of motion. She exhibits no edema or tenderness.   Lymphadenopathy:        Head (right side): No submental and no submandibular adenopathy present.        Head (left side): No submental and no submandibular adenopathy present.     She has no cervical adenopathy.        Right cervical: No superficial cervical, no deep cervical and no posterior cervical adenopathy present.       Left cervical: No superficial cervical, no deep cervical and no posterior cervical adenopathy present.        Right axillary: No pectoral and no lateral adenopathy present.        Left axillary: No pectoral and no lateral adenopathy present.       Right: No supraclavicular adenopathy present.        Left: No supraclavicular adenopathy present.   Neurological: She is alert and oriented to person, place, and time. She has normal reflexes. No cranial nerve deficit.  She exhibits normal muscle tone. Coordination normal.   Skin: Skin is warm and dry. No rash noted.   Psychiatric: She has a normal mood and affect. Her behavior is normal. Her mood appears not anxious. Her speech is not rapid and/or pressured. She is not agitated. She does not exhibit a depressed mood.   Normal behavior, thought content, insight and judgement.       Assessment:       1. Annual physical exam    2. Hyponatremia    3. Essential hypertension    4. Syncope, unspecified syncope type    5. Generalized anxiety disorder    6. Thyroid disorder, nodule removed 1969.    7. Panic attacks    8. Asymptomatic gallstones    9. History of vertebral compression fracture    10. Osteoporosis, post-menopausal    11. Kidney stones    12. Needs flu shot    13. Need for vaccination against Streptococcus pneumoniae using pneumococcal conjugate vaccine 13    14. Encounter for gynecological examination without abnormal finding    15. Left hip pain    16. Need for vaccination with 13-polyvalent pneumococcal conjugate vaccine        Plan:   Courtney was seen today for hospital follow up.    Diagnoses and all orders for this visit:    Annual physical exam    Hyponatremia  -     Comprehensive metabolic panel; Future    Essential hypertension  -     Comprehensive metabolic panel; Future  -     Uric acid; Future  -     Lipid panel; Future    Syncope, unspecified syncope type, trial taper off SSRI    Generalized anxiety disorder    Thyroid disorder, nodule removed 1969.  -     CBC auto differential; Future  -     TSH; Future    Panic attacks, none recently    Asymptomatic gallstones, monitor  -     CBC auto differential; Future    History of vertebral compression fracture, no pain    Osteoporosis, post-menopausal  -     TSH; Future  -     Vitamin D; Future    Kidney stones, moniotr sxs    Needs flu shot    Need for vaccination against Streptococcus pneumoniae using pneumococcal conjugate vaccine 13    Encounter for gynecological  examination without abnormal finding  -     Ambulatory Referral to Obstetrics / Gynecology    Left hip pain  -     X-Ray Hips Bilateral 2 View Incl AP Pelvis; Future    Need for vaccination with 13-polyvalent pneumococcal conjugate vaccine    Other orders  -     escitalopram oxalate (LEXAPRO) 5 MG Tab; Take 1 tablet (5 mg total) by mouth every morning.  -     triamterene-hydrochlorothiazide 37.5-25 mg (DYAZIDE) 37.5-25 mg per capsule; Take 1 capsule by mouth once daily. 1 Capsule Oral Every morning  -     (In Office Administered) Pneumococcal Conjugate Vaccine (13 Valent) (IM)    Health Maintenance       Date Due Completion Date    Lipid Panel 02/17/2018 2/17/2017    DEXA SCAN 03/14/2019 3/14/2017    Override on 4/25/2014: Not Clinically Appropriate    Override on 1/4/2011: Done    TETANUS VACCINE 12/03/2025 12/3/2015 (Done)    Override on 12/3/2015: Done        Return in about 6 months (around 7/9/2018) for also one year.

## 2018-01-23 ENCOUNTER — HOSPITAL ENCOUNTER (OUTPATIENT)
Dept: RADIOLOGY | Facility: HOSPITAL | Age: 83
Discharge: HOME | End: 2018-01-23
Attending: INTERNAL MEDICINE
Payer: MEDICARE

## 2018-01-23 ENCOUNTER — OFFICE VISIT (OUTPATIENT)
Dept: OBSTETRICS AND GYNECOLOGY | Facility: CLINIC | Age: 83
End: 2018-01-23
Payer: MEDICARE

## 2018-01-23 VITALS — HEIGHT: 62 IN | WEIGHT: 133 LBS | BODY MASS INDEX: 24.48 KG/M2

## 2018-01-23 DIAGNOSIS — M25.552 LEFT HIP PAIN: ICD-10-CM

## 2018-01-23 DIAGNOSIS — Z78.0 POSTMENOPAUSE: ICD-10-CM

## 2018-01-23 DIAGNOSIS — Z01.419 VISIT FOR GYNECOLOGIC EXAMINATION: Primary | ICD-10-CM

## 2018-01-23 PROCEDURE — 73521 X-RAY EXAM HIPS BI 2 VIEWS: CPT | Mod: 26,,, | Performed by: RADIOLOGY

## 2018-01-23 PROCEDURE — 73521 X-RAY EXAM HIPS BI 2 VIEWS: CPT | Mod: TC

## 2018-01-23 PROCEDURE — 99999 PR PBB SHADOW E&M-EST. PATIENT-LVL III: CPT | Mod: PBBFAC,,, | Performed by: NURSE PRACTITIONER

## 2018-01-23 PROCEDURE — G0101 CA SCREEN;PELVIC/BREAST EXAM: HCPCS | Mod: S$GLB,,, | Performed by: NURSE PRACTITIONER

## 2018-01-23 NOTE — PROGRESS NOTES
HISTORY OF PRESENT ILLNESS:    Courtney Plummer is a 82 y.o. female ., presents for a gyn check due to getting remarried after no intercourse in 5 years ().     Past Medical History:   Diagnosis Date    Anxiety     Essential hypertension 2018    Hypertension     Kidney stones 12/3/2015       Past Surgical History:   Procedure Laterality Date    BACK SURGERY      x3     SECTION, CLASSIC       last     SPINE SURGERY      THYROID SURGERY          MEDICATIONS AND ALLERGIES:      Current Outpatient Prescriptions:     alprazolam (XANAX) 0.25 MG tablet, Take 1 tablet (0.25 mg total) by mouth nightly as needed for Insomnia., Disp: 90 tablet, Rfl: 0    BIOTIN ORAL, Take by mouth., Disp: , Rfl:     escitalopram oxalate (LEXAPRO) 5 MG Tab, Take 1 tablet (5 mg total) by mouth every morning., Disp: 90 tablet, Rfl: 3    multivitamin (ONE DAILY MULTIVITAMIN) per tablet, Take 1 tablet by mouth once daily. Takes 3x weekly, Disp: , Rfl:     OMEGA-3S/DHA/EPA/FISH OIL/D3 (VITAMIN-D + OMEGA-3 ORAL), Take 1 tablet by mouth once daily., Disp: , Rfl:     potassium 99 mg Tab, Take 1 tablet by mouth once daily., Disp: , Rfl:     triamterene-hydrochlorothiazide 37.5-25 mg (DYAZIDE) 37.5-25 mg per capsule, Take 1 capsule by mouth once daily. 1 Capsule Oral Every morning, Disp: 90 capsule, Rfl: 4    Review of patient's allergies indicates:   Allergen Reactions    Sulfa (sulfonamide antibiotics) Anaphylaxis    Codeine Anxiety       Family History   Problem Relation Age of Onset    Heart disease Father     Heart disease Brother     Cancer Brother      melanoma    No Known Problems Daughter     No Known Problems Son     No Known Problems Daughter     No Known Problems Daughter     No Known Problems Son     No Known Problems Son     Breast cancer Neg Hx     Colon cancer Neg Hx     Ovarian cancer Neg Hx        Social History     Social History    Marital status:      Spouse  name: N/A    Number of children: N/A    Years of education: N/A     Occupational History    Not on file.     Social History Main Topics    Smoking status: Former Smoker     Packs/day: 1.00     Quit date: 1/1/1969    Smokeless tobacco: Never Used    Alcohol use No      Comment: occasionally    Drug use: No    Sexual activity: Not on file     Other Topics Concern    Not on file     Social History Narrative    November 11, 2014    She is adusting to being .    2 of her children have sided with her ex-.    She has no contact with her ex-husba.She has an xcellent relationship with her othe 4 children.    She is at a Neurodyn stud once a week.    For 35 years she has played bridge every other week with a group o 8 lady's.    She also plays serious bridge she once weekly at the [employer] on [address]    She is now living inher [facility].  It is located behind [facility] on [address].  It is called [facility]. Every night about 15-16 people gather for [employer].  She does nt drink.    Ever sinceher fall on January 9, 2013 she has had difficuly recovering.    Slowly, now she is getting epifanio on her feet       OB HISTORY: Number of C/S:6      COMPREHENSIVE GYN HISTORY:  PAP History:  Denies abnormal Paps.  Infection History: Denies STDs. Denies PID.  Benign History: Denies uterine fibroids. Denies ovarian cysts. Denies endometriosis.  Denies other conditions.  Cancer History: Denies cervical cancer. Denies uterine cancer or hyperplasia. Denies ovarian cancer. Denies vulvar cancer or pre-cancer. Denies vaginal cancer or pre-cancer. Denies breast cancer. Denies colon cancer.  Sexual Activity History: Denies currently being sexually active  Menstrual History: Denies menses. Pt is  not on HRT.     ROS:  GENERAL: No weight changes. No swelling. No fatigue. No fever.  CARDIOVASCULAR: No chest pain. No shortness of breath. No leg cramps.   NEUROLOGICAL: No headaches. No vision  "changes.  BREASTS: No pain. No lumps. No discharge.  ABDOMEN: No pain. No nausea. No vomiting. No diarrhea. No constipation.  REPRODUCTIVE: No abnormal bleeding.   VULVA: No pain. No lesions. No itching.  VAGINA: No relaxation. No itching. No odor. No discharge. No lesions.  URINARY: No incontinence. No nocturia. No frequency. No dysuria.    Ht 5' 2" (1.575 m)   Wt 60.3 kg (133 lb)   BMI 24.33 kg/m²     PE:  APPEARANCE: Well nourished, well developed, in no acute distress.  AFFECT: WNL, alert and oriented x 3.  PELVIC: ATROPHIC EXTERNAL FEMALE GENITALIA without lesions. Normal hair distribution. Adequate perineal body, normal urethral meatus. VAGINA DRY / ATROPHIC but NOT STENOTIC and NON PAINFUL without lesions or discharge. CERVIX STENOTIC without lesions, discharge or tenderness. No significant cystocele or rectocele. Bimanual exam shows uterus to be normal size, regular, mobile and nontender. Adnexa without masses or tenderness.    DIAGNOSIS:  1. Visit for gynecologic examination    2. Postmenopause        PLAN:    LABS AND TESTS ORDERED:  None  Up to date on mammogram, bmd, colon screening    MEDICATIONS PRESCRIBED;  None    COUNSELING:  The patient was counseled today on:  -options for treatment of vaginal atrophy with water based vaginal lubricants vs vaginal estrogen cream, tablets, ring vs Osphena and she chose lubricants.    FOLLOW-UP with me prn.     "

## 2018-01-23 NOTE — LETTER
January 23, 2018      Courtney Carlos MD  [address]  [facility] [address]           [facility]  [address]  [facility] [address]  Phone: [phone]          Patient: Courtney Plummer   MR Number: [id number]   YOB: 1935   Date of Visit: 1/23/2018       Dear Dr. Courtney Carlos:    Thank you for referring Courtney Plummer to me for evaluation. Attached you will find relevant portions of my assessment and plan of care.    If you have questions, please do not hesitate to call me. I look forward to following Courtney Plummer along with you.    Sincerely,    RADHA Tinoco, NP    Enclosure  CC:  No Recipients    If you would like to receive this communication electronically, please contact [email or web address] or [phone] to request more information on [employer] access.    For providers and/or their staff who would like to refer a patient to [facility], please contact us through our one-stop-shop provider referral line, [facility] , at [phone].    If you feel you have received this communication in error or would no longer like to receive these types of communications, please e-mail [email or web address]

## 2018-01-24 ENCOUNTER — CLINICAL SUPPORT (OUTPATIENT)
Dept: AUDIOLOGY | Facility: CLINIC | Age: 83
End: 2018-01-24
Payer: MEDICARE

## 2018-01-24 DIAGNOSIS — H90.3 SENSORINEURAL HEARING LOSS (SNHL), BILATERAL: Primary | ICD-10-CM

## 2018-01-24 PROCEDURE — 92557 COMPREHENSIVE HEARING TEST: CPT | Mod: S$GLB,,, | Performed by: AUDIOLOGIST

## 2018-01-24 NOTE — PROGRESS NOTES
1/24/2018    AUDIOLOGICAL EVALUATION:    Courtney Plummer was seen for an audiological evaluation on 1/24/2018 for decreased hearing sensitivity.  Mrs. Plummer wears Widex Clear 220 basic digital hearing aids in both ears but still has difficulty with hearing.      Audiological testing indicated a mild to profound sensorineural hearing loss bilaterally.  Speech reception thresholds were obtained at dBHL for the right ear and dBHL for the left ear.  Speech discrimination scores were obtained at % for the right ear and % for the left ear.    The hearing aids were adjusted today.  A Sensogram was completed.  The hearing aids were adjusted for comfort.    New technology and replacement hearing aids were discussed today.  Mrs. Plummer will continue with these aids for the current time and consider replacement hearing aids.    Recommend:  1.  Follow up programming as needed.  2.  Hearing aid evaluation if desired.  3.  Annual evaluation.

## 2018-02-06 ENCOUNTER — PES CALL (OUTPATIENT)
Dept: ADMINISTRATIVE | Facility: CLINIC | Age: 83
End: 2018-02-06

## 2018-02-18 ENCOUNTER — HOSPITAL ENCOUNTER (EMERGENCY)
Facility: HOSPITAL | Age: 83
Discharge: HOME | End: 2018-02-18
Attending: EMERGENCY MEDICINE
Payer: MEDICARE

## 2018-02-18 VITALS
SYSTOLIC BLOOD PRESSURE: 157 MMHG | HEIGHT: 62 IN | BODY MASS INDEX: 23.92 KG/M2 | OXYGEN SATURATION: 100 % | DIASTOLIC BLOOD PRESSURE: 70 MMHG | HEART RATE: 77 BPM | WEIGHT: 130 LBS | RESPIRATION RATE: 16 BRPM | TEMPERATURE: 98 F

## 2018-02-18 DIAGNOSIS — S51.851A: ICD-10-CM
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
## 2018-02-18 PROCEDURE — 99284 EMERGENCY DEPT VISIT MOD MDM: CPT | Mod: ,,, | Performed by: EMERGENCY MEDICINE

## 2018-02-18 PROCEDURE — 63600175 PHARM REV CODE 636 W HCPCS: Performed by: STUDENT IN AN ORGANIZED HEALTH CARE EDUCATION/TRAINING PROGRAM

## 2018-02-18 PROCEDURE — 99283 EMERGENCY DEPT VISIT LOW MDM: CPT | Mod: 25

## 2018-02-18 PROCEDURE — 96365 THER/PROPH/DIAG IV INF INIT: CPT

## 2018-02-18 PROCEDURE — 25000003 PHARM REV CODE 250: Performed by: STUDENT IN AN ORGANIZED HEALTH CARE EDUCATION/TRAINING PROGRAM

## 2018-02-18 RX ORDER — PAROXETINE 10 MG/1
10 TABLET, FILM COATED ORAL EVERY MORNING
COMMUNITY
End: 2018-08-27

## 2018-02-18 RX ORDER — AMPICILLIN AND SULBACTAM 2; 1 G/1; G/1
3 INJECTION, POWDER, FOR SOLUTION INTRAMUSCULAR; INTRAVENOUS
Status: DISCONTINUED | OUTPATIENT
Start: 2018-02-18 | End: 2018-02-18

## 2018-02-18 RX ORDER — AMOXICILLIN AND CLAVULANATE POTASSIUM 875; 125 MG/1; MG/1
1 TABLET, FILM COATED ORAL 2 TIMES DAILY
Qty: 20 TABLET | Refills: 0 | Status: SHIPPED | OUTPATIENT
Start: 2018-02-18 | End: 2018-02-28

## 2018-02-18 RX ADMIN — SODIUM CHLORIDE 3 G: 9 INJECTION, SOLUTION INTRAVENOUS at 08:02

## 2018-02-18 NOTE — ED NOTES
LOC: The patient is awake and alert; oriented x 3 and speaking appropriately.  APPEARANCE: Patient resting comfortably, patient is clean and well groomed  SKIN: warm and dry, normal skin turgor & moist mucus membranes, skin intact, no breakdown noted.Puncture wounds rt forearm from cat bite day before  MUSCULOSKELETAL: Patient moving all extremities well, no obvious swelling or deformities noted  RESPIRATORY: Airway is open and patent, respirations are spontaneous, normal effort and rate  CARDIAC: Patient has a normal rate, no peripheral edema noted, capillary refill < 3 seconds; No complaints of chest pain   ABDOMEN:denies abdominal pain

## 2018-02-18 NOTE — ED TRIAGE NOTES
Pt's cat bit pt on rt forearm yesterday at 4pm. Area swollen and painful - two puncture wounds noted.  Had a similar episode in 10/2016 and was admitted for cellulitis.

## 2018-06-04 ENCOUNTER — PES CALL (OUTPATIENT)
Dept: ADMINISTRATIVE | Facility: CLINIC | Age: 83
End: 2018-06-04

## 2018-08-27 ENCOUNTER — OFFICE VISIT (OUTPATIENT)
Dept: INTERNAL MEDICINE | Facility: CLINIC | Age: 83
End: 2018-08-27
Payer: MEDICARE

## 2018-08-27 ENCOUNTER — LAB VISIT (OUTPATIENT)
Dept: LAB | Facility: HOSPITAL | Age: 83
End: 2018-08-27
Payer: MEDICARE

## 2018-08-27 VITALS
BODY MASS INDEX: 24.09 KG/M2 | HEIGHT: 62 IN | OXYGEN SATURATION: 98 % | SYSTOLIC BLOOD PRESSURE: 130 MMHG | HEART RATE: 57 BPM | DIASTOLIC BLOOD PRESSURE: 50 MMHG | WEIGHT: 130.94 LBS

## 2018-08-27 DIAGNOSIS — R39.15 URINARY URGENCY: ICD-10-CM

## 2018-08-27 DIAGNOSIS — G47.62 NOCTURNAL LEG CRAMPS: Primary | ICD-10-CM

## 2018-08-27 DIAGNOSIS — G47.62 NOCTURNAL LEG CRAMPS: ICD-10-CM

## 2018-08-27 LAB
ANION GAP SERPL CALC-SCNC: 10 MMOL/L
BACTERIA #/AREA URNS AUTO: ABNORMAL /HPF
BILIRUB UR QL STRIP: NEGATIVE
BUN SERPL-MCNC: 20 MG/DL
CALCIUM SERPL-MCNC: 9.3 MG/DL
CHLORIDE SERPL-SCNC: 99 MMOL/L
CLARITY UR REFRACT.AUTO: ABNORMAL
CO2 SERPL-SCNC: 26 MMOL/L
COLOR UR AUTO: YELLOW
CREAT SERPL-MCNC: 0.9 MG/DL
EST. GFR  (AFRICAN AMERICAN): >60 ML/MIN/1.73 M^2
EST. GFR  (NON AFRICAN AMERICAN): 60 ML/MIN/1.73 M^2
GLUCOSE SERPL-MCNC: 87 MG/DL
GLUCOSE UR QL STRIP: NEGATIVE
HGB UR QL STRIP: ABNORMAL
KETONES UR QL STRIP: NEGATIVE
LEUKOCYTE ESTERASE UR QL STRIP: ABNORMAL
MICROSCOPIC COMMENT: ABNORMAL
NITRITE UR QL STRIP: NEGATIVE
NON-SQ EPI CELLS #/AREA URNS AUTO: <1 /HPF
PH UR STRIP: 7 [PH] (ref 5–8)
POTASSIUM SERPL-SCNC: 3.2 MMOL/L
PROT UR QL STRIP: NEGATIVE
RBC #/AREA URNS AUTO: 5 /HPF (ref 0–4)
SODIUM SERPL-SCNC: 135 MMOL/L
SP GR UR STRIP: 1.01 (ref 1–1.03)
SQUAMOUS #/AREA URNS AUTO: 0 /HPF
URN SPEC COLLECT METH UR: ABNORMAL
UROBILINOGEN UR STRIP-ACNC: NEGATIVE EU/DL
WBC #/AREA URNS AUTO: 49 /HPF (ref 0–5)

## 2018-08-27 PROCEDURE — 36415 COLL VENOUS BLD VENIPUNCTURE: CPT

## 2018-08-27 PROCEDURE — 99999 PR PBB SHADOW E&M-EST. PATIENT-LVL IV: CPT | Mod: PBBFAC,GC,, | Performed by: STUDENT IN AN ORGANIZED HEALTH CARE EDUCATION/TRAINING PROGRAM

## 2018-08-27 PROCEDURE — 80048 BASIC METABOLIC PNL TOTAL CA: CPT

## 2018-08-27 PROCEDURE — 99213 OFFICE O/P EST LOW 20 MIN: CPT | Mod: GC,S$GLB,, | Performed by: STUDENT IN AN ORGANIZED HEALTH CARE EDUCATION/TRAINING PROGRAM

## 2018-08-27 PROCEDURE — 3075F SYST BP GE 130 - 139MM HG: CPT | Mod: CPTII,GC,S$GLB, | Performed by: STUDENT IN AN ORGANIZED HEALTH CARE EDUCATION/TRAINING PROGRAM

## 2018-08-27 PROCEDURE — 81001 URINALYSIS AUTO W/SCOPE: CPT

## 2018-08-27 PROCEDURE — 3078F DIAST BP <80 MM HG: CPT | Mod: CPTII,GC,S$GLB, | Performed by: STUDENT IN AN ORGANIZED HEALTH CARE EDUCATION/TRAINING PROGRAM

## 2018-08-27 RX ORDER — LANOLIN ALCOHOL/MO/W.PET/CERES
400 CREAM (GRAM) TOPICAL DAILY
COMMUNITY
Start: 2018-07-01

## 2018-08-27 RX ORDER — MAGNESIUM HYDROXIDE 400 MG/5ML
SUSPENSION, ORAL (FINAL DOSE FORM) ORAL DAILY
COMMUNITY
Start: 2018-07-01

## 2018-08-27 NOTE — PROGRESS NOTES
Clinic Note  2018      Subjective:       Patient ID: Courtney Plummer is a 83 y.o. female being seen for an established visit.    Chief Complaint: Leg Pain and Cystitis      HPI     Ms. Plummer is a pleasant 85 yo female with HTN who presents to the clinic today with nocturnal leg cramping. She states that this has been ongoing for the past few months but has been getting worse in the past week. The cramping is usually located in her calves and affects both legs equally. She states the cramping wakes her up from sleep, she has tried heating pads to with no significant improvement. Pt also takes Potassium and Magnesium supplements daily. She reports that she plays bridge 3 times a week and has hours of prolonged sitting. She denies cramping in her upper extremities or falls. She states she drinks very little water but indulges in diet coke daily.    Pt also complains of urinary urgency and incomplete emptying on going for the past 2 days. She denies fevers and dysuria.  Past Medical History:   Diagnosis Date    Anxiety     Essential hypertension 2018    Hypertension     Kidney stones 12/3/2015       Past Surgical History:   Procedure Laterality Date    BACK SURGERY      x3     SECTION, CLASSIC       last     SPINE SURGERY      THYROID SURGERY         Family History   Problem Relation Age of Onset    Heart disease Father     Heart disease Brother     Cancer Brother         melanoma    No Known Problems Daughter     No Known Problems Son     No Known Problems Daughter     No Known Problems Daughter     No Known Problems Son     No Known Problems Son     Breast cancer Neg Hx     Colon cancer Neg Hx     Ovarian cancer Neg Hx        Social History     Socioeconomic History    Marital status:      Spouse name: None    Number of children: None    Years of education: None    Highest education level: None   Social Needs    Financial resource strain: None    Food  insecurity - worry: None    Food insecurity - inability: None    Transportation needs - medical: None    Transportation needs - non-medical: None   Occupational History    None   Tobacco Use    Smoking status: Former Smoker     Packs/day: 1.00     Last attempt to quit: 1969     Years since quittin.6    Smokeless tobacco: Never Used   Substance and Sexual Activity    Alcohol use: No     Comment: occasionally    Drug use: No    Sexual activity: None   Other Topics Concern    None   Social History Narrative    2014    She is adusting to being .    2 of her children have sided with her ex-.    She has no contact with her ex-husba.She has an xcellent relationship with her othe 4 children.    She is at a Bible stud once a week.    For 35 years she has played bridge every other week with a group o 8 lady's.    She also plays serious bridge she once weekly at the [employer] on [address]    She is now living inher [facility].  It is located behind [facility] on [address].  It is called [facility]. Every night about 15-16 people gather for [employer].  She does nt drink.    Ever sinceher fall on 2013 she has had difficuly recovering.    Slowly, now she is getting epifanio on her feet       Review of Systems   Constitutional: Negative for activity change and unexpected weight change.   HENT: Positive for hearing loss. Negative for rhinorrhea and trouble swallowing.    Eyes: Negative for discharge and visual disturbance.   Respiratory: Negative for chest tightness and wheezing.    Cardiovascular: Negative for chest pain and palpitations.   Gastrointestinal: Negative for blood in stool, constipation, diarrhea and vomiting.   Endocrine: Negative for polydipsia and polyuria.   Genitourinary: Negative for difficulty urinating, dysuria, hematuria and menstrual problem.   Musculoskeletal: Positive for arthralgias. Negative for joint swelling and neck pain.    Neurological: Negative for weakness and headaches.   Psychiatric/Behavioral: Negative for confusion and dysphoric mood.       Patient's Medications   New Prescriptions    No medications on file   Previous Medications    BIOTIN ORAL    Take by mouth.    CHOLECALCIFERAL (CHOLECALCIFERAL) 100,000 IU/ML INJECTTION    1,000 Int'l Units.    MAGNESIUM OXIDE (MAG-OX) 400 MG TABLET        MULTIVITAMIN (ONE DAILY MULTIVITAMIN) PER TABLET    Take 1 tablet by mouth once daily. Takes 3x weekly    POTASSIUM GLUCONATE 595 MG (99 MG) TAB        TRIAMTERENE-HYDROCHLOROTHIAZIDE 37.5-25 MG (DYAZIDE) 37.5-25 MG PER CAPSULE    Take 1 capsule by mouth once daily. 1 Capsule Oral Every morning   Modified Medications    No medications on file   Discontinued Medications    ALPRAZOLAM (XANAX) 0.25 MG TABLET    Take 1 tablet (0.25 mg total) by mouth nightly as needed for Insomnia.    ESCITALOPRAM OXALATE (LEXAPRO) 5 MG TAB    Take 1 tablet (5 mg total) by mouth every morning.    OMEGA-3S/DHA/EPA/FISH OIL/D3 (VITAMIN-D + OMEGA-3 ORAL)    Take 1 tablet by mouth once daily.    PAROXETINE (PAXIL) 10 MG TABLET    Take 10 mg by mouth every morning.    POTASSIUM 99 MG TAB    Take 1 tablet by mouth once daily.       Patient Active Problem List    Diagnosis Date Noted    Hyponatremia 01/08/2018    Essential hypertension 01/08/2018    Syncope 01/08/2018    Encounter for screening mammogram for malignant neoplasm of breast 02/14/2017    Combined form of senile cataract 02/14/2017    Postmenopausal status 02/14/2017    Osteoporosis, post-menopausal 04/08/2015    History of back surgery, 09- Dr. 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 05/06/2014    Asymptomatic gallstones 12/26/2013    History of vertebral compression fracture 12/26/2013    Chronic low back pain 05/02/2013    Lumbar spinal stenosis 05/02/2013    Anemia  "05/02/2013    Panic attacks 05/02/2013    Brittle nails 02/20/2013    BRIAN generalized anxiety disorder 02/20/2013    Benign positional vertigo 02/20/2013    Carotid bruit 02/20/2013    Thyroid disorder, nodule removed 1969. 02/20/2013           Objective:      BP (!) 130/50 (BP Location: Right arm, Patient Position: Sitting, BP Method: Large (Manual))   Pulse (!) 57   Ht 5' 2" (1.575 m)   Wt 59.4 kg (130 lb 15.3 oz)   SpO2 98%   BMI 23.95 kg/m²   Estimated body mass index is 23.95 kg/m² as calculated from the following:    Height as of this encounter: 5' 2" (1.575 m).    Weight as of this encounter: 59.4 kg (130 lb 15.3 oz).    Physical Exam   Constitutional: She is oriented to person, place, and time. She appears well-developed and well-nourished.   HENT:   Head: Normocephalic and atraumatic.   Eyes: Conjunctivae and EOM are normal. Pupils are equal, round, and reactive to light.   Neck: Normal range of motion. Neck supple.   Cardiovascular: Normal rate, regular rhythm and normal heart sounds.   Pulmonary/Chest: Effort normal and breath sounds normal. No respiratory distress.   Abdominal: Soft. Bowel sounds are normal. She exhibits no distension.   Musculoskeletal: She exhibits no edema or deformity.   Neurological: She is alert and oriented to person, place, and time. She displays abnormal reflex.   Skin: Skin is warm. No erythema.       Assessment:         1. Nocturnal leg cramps  Basic metabolic panel   2. Urinary urgency  URINALYSIS         Plan:         1. Nocturnal leg cramps  - Basic metabolic panel to check for electrolyte imbalance  - Pt advised to try stretching exercise before bed.  - Pt advised to try tonic water for quinine benefits. Can also try Vitamin B  Complex or Vitamin E OTC  - Will consider other pharmacologic therapies if condition worsens    2. Urinary urgency  - Urinalysis       Orders Placed This Encounter   Procedures    Basic metabolic panel     Standing Status:   Future     " Standing Expiration Date:   10/26/2019    URINALYSIS          Patient seen and plan of care discussed with MD Lashanda Garcia,   Internal Medicine, PGY-1

## 2018-08-27 NOTE — PATIENT INSTRUCTIONS
1. Take tonic water in orange juice  2. Try stretching exercise before bed  3. If cramping gets worse return for further evaluation  4. Can try Vitamin B complex or Vitamin E 800iu over the counter   5. Will call patient with results of lab work.    Leg Cramps  A muscle cramp or spasm is a strong contraction of the muscle fibers. It is also called a charley horse. This may occur in the foot, calf, or thigh at night when the legs are elevated. If the spasm is prolonged, it can become very painful.  This may be caused by sleeping in an uncomfortable position, muscle fatigue, poor muscle tone from lack of exercise and stretching, dehydration, electrolyte imbalance, diabetes, alcohol use, and certain medicine.  Home care  · Drink plenty of fluids during the day to prevent dehydration.  · Stretch your legs before bedtime.  · Eat a diet high in potassium. These foods include fresh fruit, such as bananas, oranges, cantaloupe, and honeydew melon. It also includes apple, prune, orange, grape and pineapple juices. Other foods high in potassium are white, red, and jensen beans, baked potatoes, raw spinach, cod, flounder, halibut, salmon, and scallops.  · Talk with your healthcare provider about taking mineral and vitamin supplements that contain magnesium and vitamin B-12 if you are not already taking these. Other prescription medicines may also be used.  · Avoid stimulants such as caffeine, nicotine, decongestants.  How to relieve an acute leg cramp  · For mild pain, getting out of the bed and walking may help. Some people find relief with heat and massage. You can apply heat with a warm shower, bath, or compress. Some people feel better with a cold packs. You can make an ice pack by filling a plastic bag that seals at the top with ice cubes and then wrapping it with a thin towel. Try both and use the method that feels best for 15 to 20 minutes at a time.  · For severe pain, stretching the muscle that is in spasm may quickly  relieve the pain.  · When the spasm is in your foot, your toes may curl up or down. To stretch the muscle in spasm, bend your toes in the opposite direction. If the spasm pulls your toes up, bend them down. If the spasm pulls them down, bend them up.  · When the spasm is in your calf, bend the ankle so the foot points upward toward your knee.  · When the spasm is in your thigh, bend or straighten the knee and hip until you feel relief.  Follow-up care  Follow up with your healthcare provider, or as advised.  When to seek medical advice  Call your healthcare provider right away if any of these occur:  · Walking makes your pain worse and rest makes it better  · You develop weakness in the affected leg  · Pain or frequency of spasms increases and is not controlled by the above measures  Date Last Reviewed: 11/23/2015  © 6582-0247 [employer]. [address] [place] [address]. All rights reserved. This information is not intended as a substitute for professional medical care. Always follow your healthcare professional's instructions.        Seated Hamstring Stretch    The following flexibility exercise may be suggested by your therapist. Stop the exercise if it causes pain and discuss it with your physical therapist or doctor. During the exercise, be sure not to bounce.  · Sit with one leg extended and your back straight. Bend your other leg so that the sole of your foot rests against your mid-thigh.  · Reach toward your ankle. Keep your knee, neck, and back straight.  · Feel the stretch in the back of your thigh.  · Hold for 30-60 seconds. Repeat 2 times.  · Repeat __ times per day.  For your safety, check with your healthcare provider before starting an exercise program.      Date Last Reviewed: 8/16/2015  © 0703-9169 [employer]. [address] [place] [address]. All rights reserved. This information is not intended as a substitute for professional medical care. Always  follow your healthcare professional's instructions.

## 2018-08-28 ENCOUNTER — TELEPHONE (OUTPATIENT)
Dept: INTERNAL MEDICINE | Facility: CLINIC | Age: 83
End: 2018-08-28

## 2018-08-28 ENCOUNTER — PATIENT MESSAGE (OUTPATIENT)
Dept: INTERNAL MEDICINE | Facility: CLINIC | Age: 83
End: 2018-08-28

## 2018-08-28 RX ORDER — NITROFURANTOIN (MACROCRYSTALS) 100 MG/1
100 CAPSULE ORAL EVERY 6 HOURS
Qty: 28 CAPSULE | Refills: 0 | Status: SHIPPED | OUTPATIENT
Start: 2018-08-28 | End: 2018-08-31 | Stop reason: ALTCHOICE

## 2018-08-28 NOTE — TELEPHONE ENCOUNTER
----- Message from Priscilaroque Dejesus sent at 8/28/2018 12:09 PM CDT -----  Contact: self/[phone]  Cc: Lashanda Tavarez DO  Name of test: BASIC METABOLIC PANEL [LAB15    Date of test: 08/27/18    Ordering provider: Lashanda Tavarez DO    Where was the test performed:[facility]    Comments: patient stated that she knows that her test results are in, she does feel awful today. To please call her back with test results. Thank you

## 2018-08-28 NOTE — TELEPHONE ENCOUNTER
Spoke with Pt, discussed lab results. Informed patient of the UTI. Also put in a prescription for Nitrofurantoin 100mg Q6H x 7days. Told patient to  prescription and if she doesn't show signs of improvement if a few days, she should return for evaluation.    Also discussed low potassium, asked patient to keep taking her potassium supplements.

## 2018-08-30 ENCOUNTER — TELEPHONE (OUTPATIENT)
Dept: INTERNAL MEDICINE | Facility: CLINIC | Age: 83
End: 2018-08-30

## 2018-08-30 NOTE — PROGRESS NOTES
"I have personally taken the history and examined this patient and agree with the resident's note as stated above with the following thoughts:  BP (!) 130/50 (BP Location: Right arm, Patient Position: Sitting, BP Method: Large (Manual))   Pulse (!) 57   Ht 5' 2" (1.575 m)   Wt 59.4 kg (130 lb 15.3 oz)   SpO2 98%   BMI 23.95 kg/m²    can try some tonic water for leg cramps if that does not work, we can move on to gabapentin.  CHeck UA    Answers for HPI/ROS submitted by the patient on 8/26/2018   activity change: No  unexpected weight change: No  neck pain: No  hearing loss: Yes  rhinorrhea: No  trouble swallowing: No  eye discharge: No  visual disturbance: No  chest tightness: No  wheezing: No  chest pain: No  palpitations: No  blood in stool: No  constipation: No  vomiting: No  diarrhea: No  polydipsia: No  polyuria: No  difficulty urinating: No  hematuria: No  menstrual problem: No  dysuria: No  joint swelling: No  arthralgias: Yes  headaches: No  weakness: No  confusion: No  dysphoric mood: No    "

## 2018-08-30 NOTE — TELEPHONE ENCOUNTER
Telephone call  She had an urgent care visit 08-   She had some frequency and a little burning on urination for a couple of days was prescribed nitrofurantion 100 mg took twice daily and now thinks she is having a reaction to the antibiotic. Nausea, poor appetite even some feverish feeling  But the urinary Frequency and burning has stopped completely.  Advised to stop the antibiotic and if not better or if any new symptoms develop she will call back tomorrow.

## 2018-08-30 NOTE — TELEPHONE ENCOUNTER
----- Message from Fela Lipscomb sent at 8/30/2018  1:45 PM CDT -----  Contact: patient [phone]  Pt saw  on Tuesday for a uti and was given generic macrodantin. Since then, patient is experiencing possible side effects/headache/nausea/low grade temp and would like some advice . UTI sx are improving but patient feels terrible. Could she be having side effects from the medication. Please call patient cj mario please advise  Thank you

## 2018-08-30 NOTE — TELEPHONE ENCOUNTER
----- Message from Lashanda Tavarez DO sent at 8/28/2018  2:58 PM CDT -----  Called and spoke with patient about abnormal lab results.

## 2018-08-31 ENCOUNTER — TELEPHONE (OUTPATIENT)
Dept: INTERNAL MEDICINE | Facility: CLINIC | Age: 83
End: 2018-08-31

## 2018-08-31 ENCOUNTER — LAB VISIT (OUTPATIENT)
Dept: LAB | Facility: HOSPITAL | Age: 83
End: 2018-08-31
Attending: INTERNAL MEDICINE
Payer: MEDICARE

## 2018-08-31 DIAGNOSIS — N39.0 URINARY TRACT INFECTION WITHOUT HEMATURIA, SITE UNSPECIFIED: ICD-10-CM

## 2018-08-31 DIAGNOSIS — N39.0 URINARY TRACT INFECTION WITHOUT HEMATURIA, SITE UNSPECIFIED: Primary | ICD-10-CM

## 2018-08-31 LAB
BACTERIA #/AREA URNS AUTO: ABNORMAL /HPF
BILIRUB UR QL STRIP: NEGATIVE
CLARITY UR REFRACT.AUTO: ABNORMAL
COLOR UR AUTO: YELLOW
GLUCOSE UR QL STRIP: NEGATIVE
HGB UR QL STRIP: ABNORMAL
HYALINE CASTS UR QL AUTO: 2 /LPF
KETONES UR QL STRIP: NEGATIVE
LEUKOCYTE ESTERASE UR QL STRIP: ABNORMAL
MICROSCOPIC COMMENT: ABNORMAL
NITRITE UR QL STRIP: NEGATIVE
PH UR STRIP: 6 [PH] (ref 5–8)
PROT UR QL STRIP: ABNORMAL
RBC #/AREA URNS AUTO: 7 /HPF (ref 0–4)
SP GR UR STRIP: 1.01 (ref 1–1.03)
SQUAMOUS #/AREA URNS AUTO: 2 /HPF
URN SPEC COLLECT METH UR: ABNORMAL
UROBILINOGEN UR STRIP-ACNC: NEGATIVE EU/DL
WBC #/AREA URNS AUTO: 74 /HPF (ref 0–5)
WBC CLUMPS UR QL AUTO: ABNORMAL

## 2018-08-31 PROCEDURE — 87086 URINE CULTURE/COLONY COUNT: CPT

## 2018-08-31 PROCEDURE — 81001 URINALYSIS AUTO W/SCOPE: CPT

## 2018-08-31 PROCEDURE — 87088 URINE BACTERIA CULTURE: CPT

## 2018-08-31 PROCEDURE — 87077 CULTURE AEROBIC IDENTIFY: CPT

## 2018-08-31 PROCEDURE — 87186 SC STD MICRODIL/AGAR DIL: CPT

## 2018-08-31 RX ORDER — AMOXICILLIN AND CLAVULANATE POTASSIUM 875; 125 MG/1; MG/1
1 TABLET, FILM COATED ORAL 2 TIMES DAILY
Qty: 14 TABLET | Refills: 0 | Status: SHIPPED | OUTPATIENT
Start: 2018-08-31 | End: 2018-09-05 | Stop reason: ALTCHOICE

## 2018-08-31 NOTE — TELEPHONE ENCOUNTER
----- Message from Adriel Li sent at 8/31/2018  1:00 PM CDT -----  Contact: Patient [phone]  Patient stating the UTI is getting worse, requesting for office to give a call back regarding please and Rx for symptoms.    Pharmacy: [facility] ([place] - [address] [phone] (Phone) [phone] (Fax    Please call an advise  Thank you

## 2018-08-31 NOTE — TELEPHONE ENCOUNTER
She is coming to the clinic to provide a urine specimen.  A urinalysis and urine culture have been ordered as future home collect, please labile and have ready for the patient.  She will be arriving in approximately 10 min.  She last took an antibiotic over 24 hr ago.  She was prescribed nitrofurantoin on August 27th  She did not respond to this antibiotic  Unfortunately, a urine culture was not obtained  She had side effects with this antibiotic  She has not had any antibiotics in over 24 hr  Now she is much worse.  She has fever, decreased appetite, burning, urgency and frequency  I have sent an antibiotic to her pharmacy, Augmentin 875 twice daily  Please provide the patient with a follow-up appointment to see me next week

## 2018-09-01 ENCOUNTER — NURSE TRIAGE (OUTPATIENT)
Dept: ADMINISTRATIVE | Facility: CLINIC | Age: 83
End: 2018-09-01

## 2018-09-01 NOTE — TELEPHONE ENCOUNTER
Had a urine test on yesterday. Was sent an antibiotic. Vomited 2 hours later. Wanting to know how long it takes to feel better. Wanting to know what to do to feel better. Advised to keep hydrating  And continue taking medication. Verbalized understanding.    Reason for Disposition   [1] Follow-up call to recent contact AND [2] information only call, no triage required    Protocols used: ST INFORMATION ONLY CALL-A-

## 2018-09-04 ENCOUNTER — TELEPHONE (OUTPATIENT)
Dept: INTERNAL MEDICINE | Facility: CLINIC | Age: 83
End: 2018-09-04

## 2018-09-04 LAB — BACTERIA UR CULT: NORMAL

## 2018-09-05 ENCOUNTER — PES CALL (OUTPATIENT)
Dept: ADMINISTRATIVE | Facility: CLINIC | Age: 83
End: 2018-09-05

## 2018-09-05 ENCOUNTER — LAB VISIT (OUTPATIENT)
Dept: LAB | Facility: HOSPITAL | Age: 83
End: 2018-09-05
Attending: INTERNAL MEDICINE
Payer: MEDICARE

## 2018-09-05 ENCOUNTER — OFFICE VISIT (OUTPATIENT)
Dept: INTERNAL MEDICINE | Facility: CLINIC | Age: 83
End: 2018-09-05
Payer: MEDICARE

## 2018-09-05 VITALS
HEART RATE: 72 BPM | HEIGHT: 62 IN | SYSTOLIC BLOOD PRESSURE: 124 MMHG | WEIGHT: 127.19 LBS | DIASTOLIC BLOOD PRESSURE: 82 MMHG | BODY MASS INDEX: 23.4 KG/M2 | OXYGEN SATURATION: 98 %

## 2018-09-05 DIAGNOSIS — E07.9 THYROID DISORDER: ICD-10-CM

## 2018-09-05 DIAGNOSIS — N39.0 URINARY TRACT INFECTION WITHOUT HEMATURIA, SITE UNSPECIFIED: ICD-10-CM

## 2018-09-05 DIAGNOSIS — D64.9 ANEMIA, UNSPECIFIED TYPE: ICD-10-CM

## 2018-09-05 DIAGNOSIS — I10 ESSENTIAL HYPERTENSION: ICD-10-CM

## 2018-09-05 DIAGNOSIS — E53.8 VITAMIN B 12 DEFICIENCY: ICD-10-CM

## 2018-09-05 DIAGNOSIS — Z87.81 HISTORY OF VERTEBRAL COMPRESSION FRACTURE: ICD-10-CM

## 2018-09-05 DIAGNOSIS — I70.0 ATHEROSCLEROSIS OF AORTA: ICD-10-CM

## 2018-09-05 DIAGNOSIS — E87.1 HYPONATREMIA: ICD-10-CM

## 2018-09-05 DIAGNOSIS — G60.9 IDIOPATHIC PERIPHERAL NEUROPATHY: ICD-10-CM

## 2018-09-05 DIAGNOSIS — N39.0 URINARY TRACT INFECTION WITHOUT HEMATURIA, SITE UNSPECIFIED: Primary | ICD-10-CM

## 2018-09-05 DIAGNOSIS — M81.0 OSTEOPOROSIS, POST-MENOPAUSAL: ICD-10-CM

## 2018-09-05 LAB
BILIRUB UR QL STRIP: NEGATIVE
CLARITY UR REFRACT.AUTO: CLEAR
COLOR UR AUTO: YELLOW
GLUCOSE UR QL STRIP: NEGATIVE
HGB UR QL STRIP: NEGATIVE
KETONES UR QL STRIP: NEGATIVE
LEUKOCYTE ESTERASE UR QL STRIP: NEGATIVE
NITRITE UR QL STRIP: NEGATIVE
PH UR STRIP: 8 [PH] (ref 5–8)
PROT UR QL STRIP: NEGATIVE
SP GR UR STRIP: 1.01 (ref 1–1.03)
URN SPEC COLLECT METH UR: NORMAL
UROBILINOGEN UR STRIP-ACNC: NEGATIVE EU/DL

## 2018-09-05 PROCEDURE — 99999 PR PBB SHADOW E&M-EST. PATIENT-LVL IV: CPT | Mod: PBBFAC,,, | Performed by: INTERNAL MEDICINE

## 2018-09-05 PROCEDURE — 87086 URINE CULTURE/COLONY COUNT: CPT

## 2018-09-05 PROCEDURE — 99499 UNLISTED E&M SERVICE: CPT | Mod: HCNC,S$GLB,, | Performed by: INTERNAL MEDICINE

## 2018-09-05 PROCEDURE — 99214 OFFICE O/P EST MOD 30 MIN: CPT | Mod: PBBFAC | Performed by: INTERNAL MEDICINE

## 2018-09-05 PROCEDURE — 81003 URINALYSIS AUTO W/O SCOPE: CPT

## 2018-09-05 PROCEDURE — 99214 OFFICE O/P EST MOD 30 MIN: CPT | Mod: S$PBB,,, | Performed by: INTERNAL MEDICINE

## 2018-09-05 PROCEDURE — 3074F SYST BP LT 130 MM HG: CPT | Mod: CPTII,,, | Performed by: INTERNAL MEDICINE

## 2018-09-05 PROCEDURE — 1101F PT FALLS ASSESS-DOCD LE1/YR: CPT | Mod: CPTII,,, | Performed by: INTERNAL MEDICINE

## 2018-09-05 PROCEDURE — 3079F DIAST BP 80-89 MM HG: CPT | Mod: CPTII,,, | Performed by: INTERNAL MEDICINE

## 2018-09-05 RX ORDER — ESTRADIOL 0.1 MG/G
1 CREAM VAGINAL
Qty: 42.5 G | Refills: 1 | Status: SHIPPED | OUTPATIENT
Start: 2018-09-06 | End: 2019-03-18

## 2018-09-05 NOTE — PATIENT INSTRUCTIONS
Drink 64 fluid oz of water a day, eight 8 oz glasses of water.  Probiotics. Tal take 2 oz shot daily. Tablespoon of any flavored yogurt active culture.  Try to eat 90 grams of protein per day.   at [employer]?  Never take an antibiotic for a urinary tract infection unless a urine culture has been obtained before you start the antibiotic.

## 2018-09-05 NOTE — TELEPHONE ENCOUNTER
Dear Claudia,   Please call the patient to be sure that she has improved.  She had a urinary tract infection  The most recent antibiotic used to treat her was Augmentin and the organism which grew from her urine was sensitive to that antibiotic.    Hopefully she is better or it lease improving now  Sincerely, Dr. Courtney Carlos

## 2018-09-06 ENCOUNTER — LAB VISIT (OUTPATIENT)
Dept: LAB | Facility: HOSPITAL | Age: 83
End: 2018-09-06
Attending: INTERNAL MEDICINE
Payer: MEDICARE

## 2018-09-06 DIAGNOSIS — I10 ESSENTIAL HYPERTENSION: ICD-10-CM

## 2018-09-06 DIAGNOSIS — N39.0 URINARY TRACT INFECTION WITHOUT HEMATURIA, SITE UNSPECIFIED: ICD-10-CM

## 2018-09-06 DIAGNOSIS — D64.9 ANEMIA, UNSPECIFIED TYPE: ICD-10-CM

## 2018-09-06 DIAGNOSIS — M81.0 OSTEOPOROSIS, POST-MENOPAUSAL: ICD-10-CM

## 2018-09-06 DIAGNOSIS — E07.9 THYROID DISORDER: ICD-10-CM

## 2018-09-06 DIAGNOSIS — E53.8 VITAMIN B 12 DEFICIENCY: ICD-10-CM

## 2018-09-06 LAB
25(OH)D3+25(OH)D2 SERPL-MCNC: 47 NG/ML
ALBUMIN SERPL BCP-MCNC: 3.7 G/DL
ALP SERPL-CCNC: 69 U/L
ALT SERPL W/O P-5'-P-CCNC: 29 U/L
ANION GAP SERPL CALC-SCNC: 10 MMOL/L
ANISOCYTOSIS BLD QL SMEAR: SLIGHT
AST SERPL-CCNC: 23 U/L
BASOPHILS # BLD AUTO: ABNORMAL K/UL
BASOPHILS NFR BLD: 0 %
BILIRUB SERPL-MCNC: 0.5 MG/DL
BUN SERPL-MCNC: 16 MG/DL
CALCIUM SERPL-MCNC: 9.6 MG/DL
CHLORIDE SERPL-SCNC: 96 MMOL/L
CHOLEST SERPL-MCNC: 208 MG/DL
CHOLEST/HDLC SERPL: 4.5 {RATIO}
CO2 SERPL-SCNC: 27 MMOL/L
CREAT SERPL-MCNC: 0.8 MG/DL
DIFFERENTIAL METHOD: ABNORMAL
EOSINOPHIL # BLD AUTO: ABNORMAL K/UL
EOSINOPHIL NFR BLD: 3 %
ERYTHROCYTE [DISTWIDTH] IN BLOOD BY AUTOMATED COUNT: 16.7 %
EST. GFR  (AFRICAN AMERICAN): >60 ML/MIN/1.73 M^2
EST. GFR  (NON AFRICAN AMERICAN): >60 ML/MIN/1.73 M^2
GLUCOSE SERPL-MCNC: 98 MG/DL
HCT VFR BLD AUTO: 37.5 %
HDLC SERPL-MCNC: 46 MG/DL
HDLC SERPL: 22.1 %
HGB BLD-MCNC: 11.9 G/DL
LDLC SERPL CALC-MCNC: 148.4 MG/DL
LYMPHOCYTES # BLD AUTO: ABNORMAL K/UL
LYMPHOCYTES NFR BLD: 37 %
MCH RBC QN AUTO: 28.2 PG
MCHC RBC AUTO-ENTMCNC: 31.7 G/DL
MCV RBC AUTO: 89 FL
MONOCYTES # BLD AUTO: ABNORMAL K/UL
MONOCYTES NFR BLD: 10 %
NEUTROPHILS NFR BLD: 50 %
NONHDLC SERPL-MCNC: 162 MG/DL
NRBC BLD-RTO: 0 /100 WBC
OVALOCYTES BLD QL SMEAR: ABNORMAL
PLATELET # BLD AUTO: 288 K/UL
PLATELET BLD QL SMEAR: ABNORMAL
PMV BLD AUTO: 10.2 FL
POIKILOCYTOSIS BLD QL SMEAR: SLIGHT
POTASSIUM SERPL-SCNC: 3.5 MMOL/L
PROT SERPL-MCNC: 7.3 G/DL
PTH-INTACT SERPL-MCNC: 58 PG/ML
RBC # BLD AUTO: 4.22 M/UL
SCHISTOCYTES BLD QL SMEAR: ABNORMAL
SODIUM SERPL-SCNC: 133 MMOL/L
TRIGL SERPL-MCNC: 68 MG/DL
TSH SERPL DL<=0.005 MIU/L-ACNC: 0.67 UIU/ML
VIT B12 SERPL-MCNC: 1109 PG/ML
WBC # BLD AUTO: 6.16 K/UL

## 2018-09-06 PROCEDURE — 84443 ASSAY THYROID STIM HORMONE: CPT

## 2018-09-06 PROCEDURE — 82607 VITAMIN B-12: CPT

## 2018-09-06 PROCEDURE — 80053 COMPREHEN METABOLIC PANEL: CPT

## 2018-09-06 PROCEDURE — 80061 LIPID PANEL: CPT

## 2018-09-06 PROCEDURE — 83970 ASSAY OF PARATHORMONE: CPT

## 2018-09-06 PROCEDURE — 82306 VITAMIN D 25 HYDROXY: CPT

## 2018-09-06 PROCEDURE — 36415 COLL VENOUS BLD VENIPUNCTURE: CPT

## 2018-09-06 NOTE — PROGRESS NOTES
Subjective:       Patient ID: Courtney Plummer is a 83 y.o. female.    Chief Complaint: Follow-up (UTI follow up)    She had mild symptoms of dysuria.  Was treated with nitrofurantoin.  Had a terrible drug reaction to nitrofurantoin  Was changed to Augmentin  She is feeling better now.        HPI  Review of Systems   Constitutional: Negative.  Negative for activity change, appetite change, chills, fatigue, fever and unexpected weight change.   HENT: Negative for hearing loss and tinnitus.    Eyes: Negative for visual disturbance.   Respiratory: Negative for cough, shortness of breath and wheezing.    Cardiovascular: Negative.  Negative for chest pain, palpitations and leg swelling.   Gastrointestinal: Negative for abdominal pain, blood in stool, constipation, diarrhea and nausea.   Genitourinary: Negative for dysuria, frequency and urgency.   Musculoskeletal: Negative for back pain and neck stiffness.   Skin: Negative for rash.   Neurological: Negative for headaches.   Psychiatric/Behavioral: Negative for dysphoric mood and sleep disturbance. The patient is not nervous/anxious.        Objective:      Physical Exam   Constitutional: She appears well-nourished.   HENT:   Head: Atraumatic.   Eyes: Conjunctivae are normal. No scleral icterus.   Neck: Neck supple.   Cardiovascular: Normal rate and regular rhythm.   Pulmonary/Chest: Effort normal and breath sounds normal.   Abdominal: Soft. There is no tenderness.   Musculoskeletal: She exhibits no edema.   Lymphadenopathy:     She has no cervical adenopathy.   Neurological: She is alert.   Skin: Skin is warm and dry.   Psychiatric: She has a normal mood and affect. Her behavior is normal.       Assessment:       1. Urinary tract infection without hematuria, site unspecified    2. Essential hypertension    3. Hyponatremia    4. History of vertebral compression fracture    5. Osteoporosis, post-menopausal    6. Thyroid disorder, nodule removed 1969.    7. Anemia,  unspecified type    8. Vitamin B 12 deficiency    9. Idiopathic peripheral neuropathy, since 2014, back surgery    10. Atherosclerosis of aorta        Plan:   Courtney was seen today for follow-up.    Diagnoses and all orders for this visit:    Urinary tract infection without hematuria, site unspecified  -     Urinalysis; Future  -     Urine culture; Future  -     CBC auto differential; Future    Essential hypertension  -     Comprehensive metabolic panel; Future  -     Lipid panel; Future    Hyponatremia    History of vertebral compression fracture    Osteoporosis, post-menopausal  -     Vitamin D; Future  -     PTH, intact; Future    Thyroid disorder, nodule removed 1969.  -     TSH; Future  -     PTH, intact; Future    Anemia, unspecified type  -     CBC auto differential; Future    Vitamin B 12 deficiency  -     Vitamin B12; Future    Idiopathic peripheral neuropathy, since 2014, back surgery , does not need medication.  Formal exercise program strongly encouraged.    Atherosclerosis of aorta, 81 mg of aspirin  Daily.  Other orders  -     estradiol (ESTRACE) 0.01 % (0.1 mg/gram) vaginal cream; Place 1 g vaginally twice a week.    Drink 64 fluid oz of water a day, eight 8 oz glasses of water.  Probiotics. Kumbucha take 2 oz shot daily. Tablespoon of any flavored yogurt active culture.  Try to eat 90 grams of protein per day.   at [place]?  Never take an antibiotic for a urinary tract infection unless a urine culture has been obtained before you start the antibiotic.

## 2018-09-07 LAB — BACTERIA UR CULT: NO GROWTH

## 2018-09-08 ENCOUNTER — TELEPHONE (OUTPATIENT)
Dept: INTERNAL MEDICINE | Facility: CLINIC | Age: 83
End: 2018-09-08

## 2018-09-08 DIAGNOSIS — E87.1 HYPONATREMIA: Primary | ICD-10-CM

## 2018-09-10 ENCOUNTER — TELEPHONE (OUTPATIENT)
Dept: INTERNAL MEDICINE | Facility: CLINIC | Age: 83
End: 2018-09-10

## 2018-09-11 ENCOUNTER — TELEPHONE (OUTPATIENT)
Dept: INTERNAL MEDICINE | Facility: CLINIC | Age: 83
End: 2018-09-11

## 2018-09-11 DIAGNOSIS — N39.0 RECURRENT UTI: ICD-10-CM

## 2018-09-11 NOTE — TELEPHONE ENCOUNTER
Dear Claudia,  Please call patient.  No needs to be off abo 24 hours  Get repeat U/A and C and S  US renal ordered to r/o stone  See orders  Sincerely, Dr. Courtney Carlos

## 2018-09-11 NOTE — TELEPHONE ENCOUNTER
----- Message from Patricio Thorpe sent at 9/10/2018  3:49 PM CDT -----  Contact: self/[phone]  Type: Returning a call    Who left a message? Iraida    When did the practice call? Today     Comments: Please advise.        Thanks

## 2018-09-11 NOTE — TELEPHONE ENCOUNTER
Spoke with patient and she stated that she started having the same symptoms of the UTI, she recently was taking antibiotics for. Patient stated she only has 3 antibiotics left and she took the last one last night and would like to know if another  script can be called in for the same problem.      Please advise  Thank you

## 2018-09-12 NOTE — TELEPHONE ENCOUNTER
Spoke with pt advised of MD recommendation. US scheduled. Advised of home collect UA and culture. Pt verbalized understanding stating she will complete all on Tuesday 18.

## 2018-09-18 ENCOUNTER — HOSPITAL ENCOUNTER (OUTPATIENT)
Dept: RADIOLOGY | Facility: HOSPITAL | Age: 83
Discharge: HOME | End: 2018-09-18
Attending: INTERNAL MEDICINE
Payer: MEDICARE

## 2018-09-18 DIAGNOSIS — N39.0 RECURRENT UTI: ICD-10-CM

## 2018-09-18 PROCEDURE — 76770 US EXAM ABDO BACK WALL COMP: CPT | Mod: TC

## 2018-09-18 PROCEDURE — 76770 US EXAM ABDO BACK WALL COMP: CPT | Mod: 26,,, | Performed by: RADIOLOGY

## 2018-09-20 ENCOUNTER — PATIENT MESSAGE (OUTPATIENT)
Dept: INTERNAL MEDICINE | Facility: CLINIC | Age: 83
End: 2018-09-20

## 2018-09-21 ENCOUNTER — PATIENT MESSAGE (OUTPATIENT)
Dept: INTERNAL MEDICINE | Facility: CLINIC | Age: 83
End: 2018-09-21

## 2018-09-21 NOTE — TELEPHONE ENCOUNTER
Test Results     Courtney Plummer Mary L., MD 37 minutes ago (4:19 PM)         Dr. Carlos,      Regarding the kidney scan I has on Tuesday, it states positive for renal disease.  Do I have kidney problems?  HOw serous is it?     Please let me know what I should do.       Thank you,       Courtney Plummer     [email or web address]             Please advise  Thank you

## 2018-09-23 ENCOUNTER — HOSPITAL ENCOUNTER (EMERGENCY)
Facility: HOSPITAL | Age: 83
Discharge: HOME | End: 2018-09-24
Attending: EMERGENCY MEDICINE
Payer: MEDICARE

## 2018-09-23 DIAGNOSIS — M54.9 BACK PAIN, UNSPECIFIED BACK LOCATION, UNSPECIFIED BACK PAIN LATERALITY, UNSPECIFIED CHRONICITY: ICD-10-CM

## 2018-09-23 DIAGNOSIS — M62.838 MUSCLE SPASM: Primary | ICD-10-CM

## 2018-09-23 PROCEDURE — 96375 TX/PRO/DX INJ NEW DRUG ADDON: CPT

## 2018-09-23 PROCEDURE — 96374 THER/PROPH/DIAG INJ IV PUSH: CPT

## 2018-09-23 PROCEDURE — 63600175 PHARM REV CODE 636 W HCPCS: Performed by: EMERGENCY MEDICINE

## 2018-09-23 PROCEDURE — 99283 EMERGENCY DEPT VISIT LOW MDM: CPT | Mod: ,,, | Performed by: EMERGENCY MEDICINE

## 2018-09-23 PROCEDURE — 99283 EMERGENCY DEPT VISIT LOW MDM: CPT | Mod: 25

## 2018-09-23 PROCEDURE — 25000003 PHARM REV CODE 250: Performed by: EMERGENCY MEDICINE

## 2018-09-23 RX ORDER — KETOROLAC TROMETHAMINE 30 MG/ML
10 INJECTION, SOLUTION INTRAMUSCULAR; INTRAVENOUS
Status: COMPLETED | OUTPATIENT
Start: 2018-09-23 | End: 2018-09-23

## 2018-09-23 RX ORDER — DIAZEPAM 5 MG/1
5 TABLET ORAL
Status: COMPLETED | OUTPATIENT
Start: 2018-09-23 | End: 2018-09-23

## 2018-09-23 RX ORDER — FENTANYL CITRATE 50 UG/ML
75 INJECTION, SOLUTION INTRAMUSCULAR; INTRAVENOUS
Status: COMPLETED | OUTPATIENT
Start: 2018-09-23 | End: 2018-09-23

## 2018-09-23 RX ADMIN — KETOROLAC TROMETHAMINE 10 MG: 30 INJECTION, SOLUTION INTRAMUSCULAR at 11:09

## 2018-09-23 RX ADMIN — DIAZEPAM 5 MG: 5 TABLET ORAL at 11:09

## 2018-09-23 RX ADMIN — FENTANYL CITRATE 75 MCG: 50 INJECTION INTRAMUSCULAR; INTRAVENOUS at 11:09

## 2018-09-24 VITALS
HEIGHT: 62 IN | SYSTOLIC BLOOD PRESSURE: 119 MMHG | DIASTOLIC BLOOD PRESSURE: 59 MMHG | BODY MASS INDEX: 23 KG/M2 | RESPIRATION RATE: 17 BRPM | TEMPERATURE: 98 F | OXYGEN SATURATION: 99 % | WEIGHT: 125 LBS | HEART RATE: 80 BPM

## 2018-09-24 PROCEDURE — 25000003 PHARM REV CODE 250: Performed by: EMERGENCY MEDICINE

## 2018-09-24 RX ORDER — TRAMADOL HYDROCHLORIDE 50 MG/1
50 TABLET ORAL EVERY 8 HOURS PRN
Qty: 10 TABLET | Refills: 0 | Status: SHIPPED | OUTPATIENT
Start: 2018-09-24 | End: 2018-10-04

## 2018-09-24 RX ORDER — LIDOCAINE 50 MG/G
1 PATCH TOPICAL
Status: DISCONTINUED | OUTPATIENT
Start: 2018-09-24 | End: 2018-09-24 | Stop reason: HOSPADM

## 2018-09-24 RX ORDER — LIDOCAINE 50 MG/G
1 PATCH TOPICAL DAILY
Qty: 15 PATCH | Refills: 0 | Status: SHIPPED | OUTPATIENT
Start: 2018-09-24 | End: 2019-03-18

## 2018-09-24 RX ORDER — DIAZEPAM 5 MG/1
5 TABLET ORAL EVERY 6 HOURS PRN
Qty: 15 TABLET | Refills: 0 | Status: SHIPPED | OUTPATIENT
Start: 2018-09-24 | End: 2019-03-18

## 2018-09-24 RX ORDER — ONDANSETRON 4 MG/1
8 TABLET, ORALLY DISINTEGRATING ORAL EVERY 8 HOURS PRN
Qty: 12 TABLET | Refills: 0 | Status: SHIPPED | OUTPATIENT
Start: 2018-09-24 | End: 2019-03-18

## 2018-09-24 RX ADMIN — LIDOCAINE 1 PATCH: 50 PATCH CUTANEOUS at 12:09

## 2018-09-24 NOTE — ED PROVIDER NOTES
"Encounter Date: 2018    SCRIBE #1 NOTE: I, Mily Medhat, am scribing for, and in the presence of,  Dr. Pope . I have scribed the entire note.       History     Chief Complaint   Patient presents with    Back Pain     Pt c/o back pain that began after "lifting that I shouldn't have done. After I woke up I could hardly move." Endorses taking aleve, tylenol, and naproxen without any relief. HX of spinal fusion x4 years ago- no complications. No SOB/CP.     The patient is a 83 y.o. female with PMHx of HTN and spinal fusion (), presents to the ED for back pain. Patient states onset was this morning after getting breakfast. She states she did heavy lifting yesterday but did not experience any back pain.  Patient states he took 2 Advil, 3 aleve, 1 naproxen, and 3 tylenol without resolving her back pain.       The history is provided by the patient.     Review of patient's allergies indicates:   Allergen Reactions    Sulfa (sulfonamide antibiotics) Anaphylaxis    Macrobid [nitrofurantoin monohyd/m-cryst]      Fever, joint pain    Codeine Anxiety     Past Medical History:   Diagnosis Date    Anxiety     Essential hypertension 2018    Hypertension     Kidney stones 12/3/2015     Past Surgical History:   Procedure Laterality Date    ARTHROSCOPY, KNEE Left 2014    [facility]    BACK SURGERY      x3     SECTION, CLASSIC       last     SPINE SURGERY      THYROID SURGERY       Family History   Problem Relation Age of Onset    Heart disease Father     Heart disease Brother     Cancer Brother         melanoma    No Known Problems Daughter     No Known Problems Son     No Known Problems Daughter     No Known Problems Daughter     No Known Problems Son     No Known Problems Son     Breast cancer Neg Hx     Colon cancer Neg Hx     Ovarian cancer Neg Hx      Social History     Tobacco Use    Smoking status: Former Smoker     Packs/day: " 1.00     Last attempt to quit: 1969     Years since quittin.7    Smokeless tobacco: Never Used   Substance Use Topics    Alcohol use: No     Comment: occasionally    Drug use: No     Review of Systems   Constitutional: Negative for fever.   HENT: Negative for sore throat.    Respiratory: Negative for shortness of breath.    Cardiovascular: Negative for chest pain.   Gastrointestinal: Negative for nausea.   Genitourinary: Negative for dysuria.   Musculoskeletal: Positive for back pain.   Skin: Negative for rash.   Neurological: Negative for weakness.   Hematological: Does not bruise/bleed easily.       Physical Exam     Initial Vitals [18]   BP Pulse Resp Temp SpO2   (!) 144/64 90 17 98 °F (36.7 °C) 96 %      MAP       --         Physical Exam    Nursing note and vitals reviewed.  Constitutional: Vital signs are normal. She appears well-developed and well-nourished. She appears distressed.   Lying flat    HENT:   Head: Normocephalic and atraumatic.   Mouth/Throat: Oropharynx is clear and moist.   Eyes: Conjunctivae and EOM are normal. Pupils are equal, round, and reactive to light.   Neck: Trachea normal and normal range of motion. Neck supple.   Cardiovascular: Normal rate, regular rhythm, normal heart sounds and normal pulses.   Pulmonary/Chest: Breath sounds normal. No respiratory distress.   Abdominal: Soft. Normal appearance and bowel sounds are normal. There is no tenderness.   Musculoskeletal:   Paraspinal tenderness of thoracic lumbar spine. No midline tenderness. No sensory deficit or weakness.    Neurological: She is alert and oriented to person, place, and time.   Skin: Skin is warm and dry.         ED Course   Procedures  Labs Reviewed - No data to display       Imaging Results    None          Medical Decision Making:   History:   Old Medical Records: I decided to obtain old medical records.            Scribe Attestation:   Scribe #1: I performed the above scribed service and the  documentation accurately describes the services I performed. I attest to the accuracy of the note.    Attending Attestation:             Attending ED Notes:   Emergent evaluation of back pain. No trauma or neurological deficit concerning of cauda equina. Symptoms secondary to increased activity yesterday. Will attempt symptom control and reevaluate pain.     12:26 AM  Patient's symptoms are improved. Will continue therapy at home. Return precautions advised.              Clinical Impression:   The primary encounter diagnosis was Muscle spasm. A diagnosis of Back pain, unspecified back location, unspecified back pain laterality, unspecified chronicity was also pertinent to this visit.      Disposition:   Disposition: Discharged  Condition: Stable                        Kristyn Pope MD  09/24/18 0027

## 2018-09-24 NOTE — ED TRIAGE NOTES
Pt reoprts lower back pain that began last night. Pt reports she did some haeavy lifting yesterday and had a small amount of back pain prior to going to bed last night but worse when she woke up this morning. Pt reports she had a spinal infusion back in 2015. No releif with OTC meds. Pt reports some releif with lying flat. Pt also had a UTI about two weeks ago but states her kidneys were checked a couple of days ago and were fine.

## 2018-11-01 ENCOUNTER — TELEPHONE (OUTPATIENT)
Dept: INTERNAL MEDICINE | Facility: CLINIC | Age: 83
End: 2018-11-01

## 2018-11-01 NOTE — TELEPHONE ENCOUNTER
Returned patients call but no answer, LVM for patient to call office to schedule labs. Please reschedule last lab that was cancelled    Thank you

## 2018-11-06 ENCOUNTER — IMMUNIZATION (OUTPATIENT)
Dept: PHARMACY | Facility: CLINIC | Age: 83
End: 2018-11-06
Payer: MEDICARE

## 2018-11-06 ENCOUNTER — LAB VISIT (OUTPATIENT)
Dept: LAB | Facility: HOSPITAL | Age: 83
End: 2018-11-06
Attending: INTERNAL MEDICINE
Payer: MEDICARE

## 2018-11-06 DIAGNOSIS — E87.1 HYPONATREMIA: ICD-10-CM

## 2018-11-06 LAB
ANION GAP SERPL CALC-SCNC: 8 MMOL/L
BUN SERPL-MCNC: 12 MG/DL
CALCIUM SERPL-MCNC: 9.4 MG/DL
CHLORIDE SERPL-SCNC: 99 MMOL/L
CO2 SERPL-SCNC: 30 MMOL/L
CREAT SERPL-MCNC: 0.8 MG/DL
EST. GFR  (AFRICAN AMERICAN): >60 ML/MIN/1.73 M^2
EST. GFR  (NON AFRICAN AMERICAN): >60 ML/MIN/1.73 M^2
GLUCOSE SERPL-MCNC: 93 MG/DL
POTASSIUM SERPL-SCNC: 4.3 MMOL/L
SODIUM SERPL-SCNC: 137 MMOL/L

## 2018-11-06 PROCEDURE — 80048 BASIC METABOLIC PNL TOTAL CA: CPT | Mod: HCNC

## 2018-11-06 PROCEDURE — 36415 COLL VENOUS BLD VENIPUNCTURE: CPT | Mod: HCNC

## 2019-01-10 ENCOUNTER — TELEPHONE (OUTPATIENT)
Dept: INTERNAL MEDICINE | Facility: CLINIC | Age: 84
End: 2019-01-10

## 2019-01-10 NOTE — TELEPHONE ENCOUNTER
Was in a bridge tournament  Yesterday started with ST, nasal congestion and today feels exhausted ---no fever, no cough, and no urinary tract symptoms.  Advise : rest, lots of fluids, saline nasal spray or mucinex report any new symptoms or failure to improve

## 2019-01-10 NOTE — TELEPHONE ENCOUNTER
----- Message from Priscila Dejesus sent at 1/10/2019  2:37 PM CST -----  Contact: self/[phone]  .1 Patient would like to get medical advice.  Symptoms (please be specific): soar throat, congestion  How long has patient had these symptoms: Last night 01/09/19  Pharmacy name and phone#: [facility] ([place] - [address] - [address] [phone] (Phone) [phone] (Fax)  Any drug allergies: onfile  Comments: Patient would like to get medical advice from PCP per patient because that is the only doctor that she trust. Thank you    Please advise  Thank you

## 2019-01-11 ENCOUNTER — OFFICE VISIT (OUTPATIENT)
Dept: URGENT CARE | Facility: CLINIC | Age: 84
End: 2019-01-11
Payer: MEDICARE

## 2019-01-11 VITALS
RESPIRATION RATE: 18 BRPM | OXYGEN SATURATION: 99 % | DIASTOLIC BLOOD PRESSURE: 71 MMHG | BODY MASS INDEX: 23 KG/M2 | TEMPERATURE: 97 F | HEART RATE: 77 BPM | SYSTOLIC BLOOD PRESSURE: 144 MMHG | WEIGHT: 125 LBS | HEIGHT: 62 IN

## 2019-01-11 DIAGNOSIS — J06.9 VIRAL URI WITH COUGH: Primary | ICD-10-CM

## 2019-01-11 PROCEDURE — 96372 PR INJECTION,THERAP/PROPH/DIAG2ST, IM OR SUBCUT: ICD-10-PCS | Mod: 59,S$GLB,, | Performed by: NURSE PRACTITIONER

## 2019-01-11 PROCEDURE — 3077F SYST BP >= 140 MM HG: CPT | Mod: CPTII,S$GLB,, | Performed by: NURSE PRACTITIONER

## 2019-01-11 PROCEDURE — 99214 PR OFFICE/OUTPT VISIT, EST, LEVL IV, 30-39 MIN: ICD-10-PCS | Mod: 25,S$GLB,, | Performed by: NURSE PRACTITIONER

## 2019-01-11 PROCEDURE — 99214 OFFICE O/P EST MOD 30 MIN: CPT | Mod: 25,S$GLB,, | Performed by: NURSE PRACTITIONER

## 2019-01-11 PROCEDURE — 3077F PR MOST RECENT SYSTOLIC BLOOD PRESSURE >= 140 MM HG: ICD-10-PCS | Mod: CPTII,S$GLB,, | Performed by: NURSE PRACTITIONER

## 2019-01-11 PROCEDURE — 3078F DIAST BP <80 MM HG: CPT | Mod: CPTII,S$GLB,, | Performed by: NURSE PRACTITIONER

## 2019-01-11 PROCEDURE — 1101F PT FALLS ASSESS-DOCD LE1/YR: CPT | Mod: CPTII,S$GLB,, | Performed by: NURSE PRACTITIONER

## 2019-01-11 PROCEDURE — 3078F PR MOST RECENT DIASTOLIC BLOOD PRESSURE < 80 MM HG: ICD-10-PCS | Mod: CPTII,S$GLB,, | Performed by: NURSE PRACTITIONER

## 2019-01-11 PROCEDURE — 1101F PR PT FALLS ASSESS DOC 0-1 FALLS W/OUT INJ PAST YR: ICD-10-PCS | Mod: CPTII,S$GLB,, | Performed by: NURSE PRACTITIONER

## 2019-01-11 PROCEDURE — 96372 THER/PROPH/DIAG INJ SC/IM: CPT | Mod: 59,S$GLB,, | Performed by: NURSE PRACTITIONER

## 2019-01-11 RX ORDER — BENZONATATE 200 MG/1
200 CAPSULE ORAL 3 TIMES DAILY PRN
Qty: 30 CAPSULE | Refills: 0 | Status: SHIPPED | OUTPATIENT
Start: 2019-01-11 | End: 2019-01-21

## 2019-01-11 RX ORDER — FLUTICASONE PROPIONATE 50 MCG
2 SPRAY, SUSPENSION (ML) NASAL DAILY
Qty: 1 BOTTLE | Refills: 0 | Status: SHIPPED | OUTPATIENT
Start: 2019-01-11 | End: 2019-03-18

## 2019-01-11 RX ORDER — BETAMETHASONE SODIUM PHOSPHATE AND BETAMETHASONE ACETATE 3; 3 MG/ML; MG/ML
6 INJECTION, SUSPENSION INTRA-ARTICULAR; INTRALESIONAL; INTRAMUSCULAR; SOFT TISSUE
Status: COMPLETED | OUTPATIENT
Start: 2019-01-11 | End: 2019-01-11

## 2019-01-11 RX ADMIN — BETAMETHASONE SODIUM PHOSPHATE AND BETAMETHASONE ACETATE 6 MG: 3; 3 INJECTION, SUSPENSION INTRA-ARTICULAR; INTRALESIONAL; INTRAMUSCULAR; SOFT TISSUE at 03:01

## 2019-01-11 NOTE — PATIENT INSTRUCTIONS
Rest.  Fluids.  Zyrtec 10 mg over-the-counter nightly.  Flonase as directed daily.  Continue with Mucinex DM as directed over-the-counter.  Tessalon if needed for dry cough.  Follow up with PCP.  Return here or go to the ER for worsening symptoms.  Viral Upper Respiratory Illness (Adult)  You have a viral upper respiratory illness (URI), which is another term for the common cold. This illness is contagious during the first few days. It is spread through the air by coughing and sneezing. It may also be spread by direct contact (touching the sick person and then touching your own eyes, nose, or mouth). Frequent handwashing will decrease risk of spread. Most viral illnesses go away within 7 to 10 days with rest and simple home remedies. Sometimes the illness may last for several weeks. Antibiotics will not kill a virus, and they are generally not prescribed for this condition.    Home care  · If symptoms are severe, rest at home for the first 2 to 3 days. When you resume activity, don't let yourself get too tired.  · Avoid being exposed to cigarette smoke (yours or others).  · You may use acetaminophen or ibuprofen to control pain and fever, unless another medicine was prescribed. (Note: If you have chronic liver or kidney disease, have ever had a stomach ulcer or gastrointestinal bleeding, or are taking blood-thinning medicines, talk with your healthcare provider before using these medicines.) Aspirin should never be given to anyone under 18 years of age who is ill with a viral infection or fever. It may cause severe liver or brain damage.  · Your appetite may be poor, so a light diet is fine. Avoid dehydration by drinking 6 to 8 glasses of fluids per day (water, soft drinks, juices, tea, or soup). Extra fluids will help loosen secretions in the nose and lungs.  · Over-the-counter cold medicines will not shorten the length of time youre sick, but they may be helpful for the following symptoms: cough, sore throat,  and nasal and sinus congestion. (Note: Do not use decongestants if you have high blood pressure.)  Follow-up care  Follow up with your healthcare provider, or as advised.  When to seek medical advice  Call your healthcare provider right away if any of these occur:  · Cough with lots of colored sputum (mucus)  · Severe headache; face, neck, or ear pain  · Difficulty swallowing due to throat pain  · Fever of 100.4°F (38°C)  Call 911, or get immediate medical care  Call emergency services right away if any of these occur:  · Chest pain, shortness of breath, wheezing, or difficulty breathing  · Coughing up blood  · Inability to swallow due to throat pain  Date Last Reviewed: 9/13/2015  © 7963-3618 [employer]. [address] [place] [address], [place] [address]. All rights reserved. This information is not intended as a substitute for professional medical care. Always follow your healthcare professional's instructions.

## 2019-01-11 NOTE — PROGRESS NOTES
"Subjective:       Patient ID: Courtney Plummer is a 83 y.o. female.    Vitals:    01/11/19 1504   BP: (!) 144/71   Pulse: 77   Resp: 18   Temp: 97.2 °F (36.2 °C)   SpO2: 99%   Weight: 56.7 kg (125 lb)   Height: 5' 2" (1.575 m)       Chief Complaint: Cough    Patient reports experiencing a cough (non productive), sneezing and nasal congestion for the past x 3 days. No fever, chills, body aches, shortness of breath, or wheezing.  Patient reports that she shared a room with a friend in which she had the same symptoms. Tried mucinex, no relief. She spoke with this friend who was seen here for the same thing and was given a steroid shot and feels better now.  Patient requesting a steroid shot.        Cough   This is a new problem. The current episode started in the past 7 days. The cough is productive of sputum. Associated symptoms include nasal congestion, postnasal drip and a sore throat. Pertinent negatives include no chest pain, chills, ear congestion, ear pain, eye redness, fever, headaches, heartburn, hemoptysis, myalgias, rash, rhinorrhea, shortness of breath, sweats, weight loss or wheezing. The treatment provided no relief.     Review of Systems   Constitution: Negative for chills, fever, malaise/fatigue and weight loss.   HENT: Positive for postnasal drip and sore throat. Negative for congestion, ear pain, hoarse voice and rhinorrhea.    Eyes: Negative for discharge and redness.   Cardiovascular: Negative for chest pain, dyspnea on exertion and leg swelling.   Respiratory: Positive for cough and sputum production. Negative for hemoptysis, shortness of breath and wheezing.    Skin: Negative for rash.   Musculoskeletal: Negative for myalgias.   Gastrointestinal: Negative for abdominal pain, heartburn and nausea.   Neurological: Negative for headaches.       Objective:      Physical Exam   Constitutional: She is oriented to person, place, and time. She appears well-developed and well-nourished. She is " cooperative.  Non-toxic appearance. She does not have a sickly appearance. She does not appear ill. No distress.   HENT:   Head: Normocephalic and atraumatic.   Right Ear: Hearing, tympanic membrane, external ear and ear canal normal.   Left Ear: Hearing, tympanic membrane, external ear and ear canal normal.   Nose: Mucosal edema (boggy) present. No rhinorrhea or nasal deformity. No epistaxis. Right sinus exhibits no maxillary sinus tenderness and no frontal sinus tenderness. Left sinus exhibits no maxillary sinus tenderness and no frontal sinus tenderness.   Mouth/Throat: Uvula is midline, oropharynx is clear and moist and mucous membranes are normal. No trismus in the jaw. Normal dentition. No uvula swelling. No oropharyngeal exudate, posterior oropharyngeal edema or posterior oropharyngeal erythema.   Hearing aides removed for visualization of TM and canals during exam.  Returned to canal by patient, tolerated well.   Eyes: Conjunctivae and lids are normal. No scleral icterus.   Sclera clear bilat   Neck: Trachea normal, full passive range of motion without pain and phonation normal. Neck supple.   Cardiovascular: Normal rate, regular rhythm, normal heart sounds, intact distal pulses and normal pulses.   Pulmonary/Chest: Effort normal and breath sounds normal. No respiratory distress. She has no wheezes.   Non productive cough present through out exam.   Abdominal: Soft. Normal appearance and bowel sounds are normal. She exhibits no distension. There is no tenderness.   Musculoskeletal: Normal range of motion. She exhibits no edema or deformity.   Lymphadenopathy:     She has no cervical adenopathy.   Neurological: She is alert and oriented to person, place, and time. She exhibits normal muscle tone. Coordination normal.   Skin: Skin is warm, dry and intact. She is not diaphoretic. No pallor.   Psychiatric: She has a normal mood and affect. Her speech is normal and behavior is normal. Judgment and thought content  normal. Cognition and memory are normal.   Nursing note and vitals reviewed.      Assessment:       1. Viral URI with cough        Plan:       Courtney was seen today for cough.    Diagnoses and all orders for this visit:    Viral URI with cough  -     fluticasone (FLONASE) 50 mcg/actuation nasal spray; 2 sprays (100 mcg total) by Each Nare route once daily.  -     benzonatate (TESSALON) 200 MG capsule; Take 1 capsule (200 mg total) by mouth 3 (three) times daily as needed for Cough.  -     betamethasone acetate-betamethasone sodium phosphate injection 6 mg      Patient Instructions   Rest.  Fluids.  Zyrtec 10 mg over-the-counter nightly.  Flonase as directed daily.  Continue with Mucinex DM as directed over-the-counter.  Tessalon if needed for dry cough.  Follow up with PCP.  Return here or go to the ER for worsening symptoms.  Viral Upper Respiratory Illness (Adult)  You have a viral upper respiratory illness (URI), which is another term for the common cold. This illness is contagious during the first few days. It is spread through the air by coughing and sneezing. It may also be spread by direct contact (touching the sick person and then touching your own eyes, nose, or mouth). Frequent handwashing will decrease risk of spread. Most viral illnesses go away within 7 to 10 days with rest and simple home remedies. Sometimes the illness may last for several weeks. Antibiotics will not kill a virus, and they are generally not prescribed for this condition.    Home care  · If symptoms are severe, rest at home for the first 2 to 3 days. When you resume activity, don't let yourself get too tired.  · Avoid being exposed to cigarette smoke (yours or others).  · You may use acetaminophen or ibuprofen to control pain and fever, unless another medicine was prescribed. (Note: If you have chronic liver or kidney disease, have ever had a stomach ulcer or gastrointestinal bleeding, or are taking blood-thinning medicines, talk  with your healthcare provider before using these medicines.) Aspirin should never be given to anyone under 18 years of age who is ill with a viral infection or fever. It may cause severe liver or brain damage.  · Your appetite may be poor, so a light diet is fine. Avoid dehydration by drinking 6 to 8 glasses of fluids per day (water, soft drinks, juices, tea, or soup). Extra fluids will help loosen secretions in the nose and lungs.  · Over-the-counter cold medicines will not shorten the length of time youre sick, but they may be helpful for the following symptoms: cough, sore throat, and nasal and sinus congestion. (Note: Do not use decongestants if you have high blood pressure.)  Follow-up care  Follow up with your healthcare provider, or as advised.  When to seek medical advice  Call your healthcare provider right away if any of these occur:  · Cough with lots of colored sputum (mucus)  · Severe headache; face, neck, or ear pain  · Difficulty swallowing due to throat pain  · Fever of 100.4°F (38°C)  Call 911, or get immediate medical care  Call emergency services right away if any of these occur:  · Chest pain, shortness of breath, wheezing, or difficulty breathing  · Coughing up blood  · Inability to swallow due to throat pain  Date Last Reviewed: 9/13/2015  © 6643-6670 [employer]. [address] [place] [address], [place] [address]. All rights reserved. This information is not intended as a substitute for professional medical care. Always follow your healthcare professional's instructions.

## 2019-02-20 ENCOUNTER — LAB VISIT (OUTPATIENT)
Dept: LAB | Facility: HOSPITAL | Age: 84
End: 2019-02-20
Attending: INTERNAL MEDICINE
Payer: MEDICARE

## 2019-02-20 ENCOUNTER — OFFICE VISIT (OUTPATIENT)
Dept: INTERNAL MEDICINE | Facility: CLINIC | Age: 84
End: 2019-02-20
Payer: MEDICARE

## 2019-02-20 VITALS
DIASTOLIC BLOOD PRESSURE: 60 MMHG | HEART RATE: 88 BPM | SYSTOLIC BLOOD PRESSURE: 136 MMHG | WEIGHT: 125.69 LBS | HEIGHT: 62 IN | BODY MASS INDEX: 23.13 KG/M2

## 2019-02-20 DIAGNOSIS — I10 ESSENTIAL HYPERTENSION: ICD-10-CM

## 2019-02-20 DIAGNOSIS — E07.9 THYROID DISORDER: ICD-10-CM

## 2019-02-20 DIAGNOSIS — L65.9 HAIR LOSS: ICD-10-CM

## 2019-02-20 DIAGNOSIS — R29.3 POOR POSTURE: ICD-10-CM

## 2019-02-20 DIAGNOSIS — I70.0 ATHEROSCLEROSIS OF AORTA: ICD-10-CM

## 2019-02-20 DIAGNOSIS — M81.0 OSTEOPOROSIS, POST-MENOPAUSAL: ICD-10-CM

## 2019-02-20 DIAGNOSIS — S22.080A COMPRESSION FRACTURE OF T12 VERTEBRA: Primary | ICD-10-CM

## 2019-02-20 LAB — TSH SERPL DL<=0.005 MIU/L-ACNC: 1.16 UIU/ML

## 2019-02-20 PROCEDURE — 84443 ASSAY THYROID STIM HORMONE: CPT | Mod: HCNC

## 2019-02-20 PROCEDURE — 99999 PR PBB SHADOW E&M-EST. PATIENT-LVL IV: CPT | Mod: PBBFAC,HCNC,, | Performed by: INTERNAL MEDICINE

## 2019-02-20 PROCEDURE — 99214 OFFICE O/P EST MOD 30 MIN: CPT | Mod: HCNC,S$GLB,, | Performed by: INTERNAL MEDICINE

## 2019-02-20 PROCEDURE — 99499 UNLISTED E&M SERVICE: CPT | Mod: S$GLB,,, | Performed by: INTERNAL MEDICINE

## 2019-02-20 PROCEDURE — 99499 RISK ADDL DX/OHS AUDIT: ICD-10-PCS | Mod: S$GLB,,, | Performed by: INTERNAL MEDICINE

## 2019-02-20 PROCEDURE — 99999 PR PBB SHADOW E&M-EST. PATIENT-LVL IV: ICD-10-PCS | Mod: PBBFAC,HCNC,, | Performed by: INTERNAL MEDICINE

## 2019-02-20 PROCEDURE — 3078F PR MOST RECENT DIASTOLIC BLOOD PRESSURE < 80 MM HG: ICD-10-PCS | Mod: HCNC,CPTII,S$GLB, | Performed by: INTERNAL MEDICINE

## 2019-02-20 PROCEDURE — 3078F DIAST BP <80 MM HG: CPT | Mod: HCNC,CPTII,S$GLB, | Performed by: INTERNAL MEDICINE

## 2019-02-20 PROCEDURE — 1101F PT FALLS ASSESS-DOCD LE1/YR: CPT | Mod: HCNC,CPTII,S$GLB, | Performed by: INTERNAL MEDICINE

## 2019-02-20 PROCEDURE — 99214 PR OFFICE/OUTPT VISIT, EST, LEVL IV, 30-39 MIN: ICD-10-PCS | Mod: HCNC,S$GLB,, | Performed by: INTERNAL MEDICINE

## 2019-02-20 PROCEDURE — 3075F SYST BP GE 130 - 139MM HG: CPT | Mod: HCNC,CPTII,S$GLB, | Performed by: INTERNAL MEDICINE

## 2019-02-20 PROCEDURE — 1101F PR PT FALLS ASSESS DOC 0-1 FALLS W/OUT INJ PAST YR: ICD-10-PCS | Mod: HCNC,CPTII,S$GLB, | Performed by: INTERNAL MEDICINE

## 2019-02-20 PROCEDURE — 3075F PR MOST RECENT SYSTOLIC BLOOD PRESS GE 130-139MM HG: ICD-10-PCS | Mod: HCNC,CPTII,S$GLB, | Performed by: INTERNAL MEDICINE

## 2019-02-20 PROCEDURE — 36415 COLL VENOUS BLD VENIPUNCTURE: CPT | Mod: HCNC

## 2019-02-20 RX ORDER — TRIAMTERENE AND HYDROCHLOROTHIAZIDE 37.5; 25 MG/1; MG/1
1 CAPSULE ORAL DAILY
Qty: 90 CAPSULE | Refills: 4 | Status: SHIPPED | OUTPATIENT
Start: 2019-02-20 | End: 2020-01-14 | Stop reason: SDUPTHER

## 2019-02-20 RX ORDER — KETOCONAZOLE 20 MG/ML
SHAMPOO, SUSPENSION TOPICAL
Qty: 120 ML | Refills: 5 | Status: SHIPPED | OUTPATIENT
Start: 2019-02-21 | End: 2019-11-12

## 2019-02-20 NOTE — PROGRESS NOTES
Subjective:       Patient ID: Courtney Plummer is a 83 y.o. female.    Chief Complaint: Follow-up (would like to have thyroid checked )   She has a history of osteoporosis treated with alendronate, unknown length of time.    She was cleaning out her garage in September and awakened the following morning in agony with her back.  She was taken to an emergency room, admitted to the hospital and a kyphoplasty was performed, she was discharged home the next day.  She presents today because she is not recovering completely.  Her posture is off.  She continues to have pain in her back.    Additionally, she is concerned that she could have hypothyroidism because she had part of her thyroid gland removed many years ago and has begun to lose hair.    For many years she has taken triamterene-hydrochlorothiazide 37.5-25 mg tablet every day for control of her blood pressure without side effects and she would like to continue on this medication.  HPI  Review of Systems   Constitutional: Negative for activity change and unexpected weight change.   HENT: Positive for hearing loss. Negative for rhinorrhea and trouble swallowing.    Eyes: Negative for discharge and visual disturbance.   Respiratory: Negative for chest tightness and wheezing.    Cardiovascular: Negative for chest pain and palpitations.   Gastrointestinal: Negative for blood in stool, constipation, diarrhea and vomiting.   Endocrine: Negative for polydipsia and polyuria.   Genitourinary: Negative for difficulty urinating, dysuria, hematuria and menstrual problem.   Musculoskeletal: Positive for arthralgias. Negative for joint swelling and neck pain.   Neurological: Negative for weakness and headaches.   Psychiatric/Behavioral: Negative for confusion and dysphoric mood.       Objective:      Physical Exam   Constitutional: She is oriented to person, place, and time. She appears well-nourished.   HENT:   Head: Atraumatic.   Eyes: Conjunctivae are normal. No scleral  icterus.   Neck: Neck supple.   Cardiovascular: Normal rate and regular rhythm.   Pulmonary/Chest: Effort normal and breath sounds normal.   Abdominal: Soft. There is no tenderness.   Musculoskeletal: She exhibits no edema.   Lymphadenopathy:     She has no cervical adenopathy.   Neurological: She is alert and oriented to person, place, and time. She exhibits normal muscle tone. Coordination normal.   She has poor posture.   Skin: Skin is warm and dry.   Psychiatric: She has a normal mood and affect. Her behavior is normal.   Nursing note and vitals reviewed.      Assessment:       1. Compression fracture of T12 vertebra, s/p kyphoplasty     2. Osteoporosis, post-menopausal    3. Thyroid disorder, nodule removed 1969.    4. Atherosclerosis of aorta    5. Essential hypertension    6. Hair loss    7. Poor posture        Plan:   Courtney was seen today for follow-up.    Diagnoses and all orders for this visit:    Compression fracture of T12 vertebra, s/p kyphoplasty  our scheduling staff will contact the Department of Orthopedic surgery an ask if she can be seen in the post fracture osteoporosis management clinic.  I think she would benefit from active treatment of her osteoporosis, however I am not sure which option would be best for her Forteo or Prolia?      Additionally, she realizes she needs to become more active but she is afraid to do exercise.  She also would like to improve her posture.  She is willing to work to achieve these goals in physical therapy.  -     Ambulatory Referral to Physical/Occupational Therapy  -     Ambulatory Referral to Orthopedics    Osteoporosis, post-menopausal  -     DXA Bone Density Spine And Hip; Future  -     Ambulatory Referral to Physical/Occupational Therapy  -     Ambulatory Referral to Orthopedics    Thyroid disorder, nodule removed 1969.  -     TSH; Future    Atherosclerosis of aorta    Essential hypertension    Hair loss    Poor posture  -     Ambulatory  Referral to Physical/Occupational Therapy    Other orders  -     triamterene-hydrochlorothiazide 37.5-25 mg (DYAZIDE) 37.5-25 mg per capsule; Take 1 capsule by mouth once daily. 1 Capsule Oral Every morning  -     ketoconazole (NIZORAL) 2 % shampoo; Apply topically twice a week.    Follow-up in about 6 months (around 8/20/2019) for for physical.

## 2019-02-21 ENCOUNTER — PATIENT MESSAGE (OUTPATIENT)
Dept: INTERNAL MEDICINE | Facility: CLINIC | Age: 84
End: 2019-02-21

## 2019-02-21 NOTE — TELEPHONE ENCOUNTER
----- Message -----     From: Courtney Plummer     Sent: 2/21/2019 11:26 AM CST       To: Courtney Carlos MD  Subject: Visit Follow-Up    Dr Jennifer Carlos,  On my  After Visit Summary from  2/20/2019 I saw  under Addressed Issues , Atherosclerosis of Aorta.  What is this?  Also,  do the results of the thyroid test indicate that any  follow up or treatment is needed?      Please advise  Thank you

## 2019-02-28 ENCOUNTER — OFFICE VISIT (OUTPATIENT)
Dept: ORTHOPEDICS | Facility: CLINIC | Age: 84
End: 2019-02-28
Payer: MEDICARE

## 2019-02-28 ENCOUNTER — HOSPITAL ENCOUNTER (OUTPATIENT)
Dept: RADIOLOGY | Facility: CLINIC | Age: 84
Discharge: HOME | End: 2019-02-28
Attending: INTERNAL MEDICINE
Payer: MEDICARE

## 2019-02-28 VITALS
BODY MASS INDEX: 23.37 KG/M2 | WEIGHT: 127 LBS | HEART RATE: 73 BPM | DIASTOLIC BLOOD PRESSURE: 65 MMHG | SYSTOLIC BLOOD PRESSURE: 123 MMHG | HEIGHT: 62 IN

## 2019-02-28 DIAGNOSIS — M81.0 OSTEOPOROSIS, POST-MENOPAUSAL: ICD-10-CM

## 2019-02-28 DIAGNOSIS — S22.080A COMPRESSION FRACTURE OF T12 VERTEBRA: ICD-10-CM

## 2019-02-28 DIAGNOSIS — M81.0 OSTEOPOROSIS, UNSPECIFIED OSTEOPOROSIS TYPE, UNSPECIFIED PATHOLOGICAL FRACTURE PRESENCE: ICD-10-CM

## 2019-02-28 DIAGNOSIS — M80.80XA PATHOLOGICAL FRACTURE DUE TO OSTEOPOROSIS, UNSPECIFIED FRACTURE SITE, UNSPECIFIED OSTEOPOROSIS TYPE, INITIAL ENCOUNTER: Primary | ICD-10-CM

## 2019-02-28 DIAGNOSIS — M81.6 LOCALIZED OSTEOPOROSIS OF LEQUESNE: ICD-10-CM

## 2019-02-28 PROCEDURE — 1101F PR PT FALLS ASSESS DOC 0-1 FALLS W/OUT INJ PAST YR: ICD-10-PCS | Mod: HCNC,CPTII,S$GLB, | Performed by: PHYSICIAN ASSISTANT

## 2019-02-28 PROCEDURE — 99999 PR PBB SHADOW E&M-EST. PATIENT-LVL IV: CPT | Mod: PBBFAC,HCNC,, | Performed by: PHYSICIAN ASSISTANT

## 2019-02-28 PROCEDURE — 99499 UNLISTED E&M SERVICE: CPT | Mod: S$GLB,,, | Performed by: PHYSICIAN ASSISTANT

## 2019-02-28 PROCEDURE — 3074F SYST BP LT 130 MM HG: CPT | Mod: HCNC,CPTII,S$GLB, | Performed by: PHYSICIAN ASSISTANT

## 2019-02-28 PROCEDURE — 99214 OFFICE O/P EST MOD 30 MIN: CPT | Mod: HCNC,S$GLB,, | Performed by: PHYSICIAN ASSISTANT

## 2019-02-28 PROCEDURE — 3078F DIAST BP <80 MM HG: CPT | Mod: HCNC,CPTII,S$GLB, | Performed by: PHYSICIAN ASSISTANT

## 2019-02-28 PROCEDURE — 3074F PR MOST RECENT SYSTOLIC BLOOD PRESSURE < 130 MM HG: ICD-10-PCS | Mod: HCNC,CPTII,S$GLB, | Performed by: PHYSICIAN ASSISTANT

## 2019-02-28 PROCEDURE — 99999 PR PBB SHADOW E&M-EST. PATIENT-LVL IV: ICD-10-PCS | Mod: PBBFAC,HCNC,, | Performed by: PHYSICIAN ASSISTANT

## 2019-02-28 PROCEDURE — 99214 PR OFFICE/OUTPT VISIT, EST, LEVL IV, 30-39 MIN: ICD-10-PCS | Mod: HCNC,S$GLB,, | Performed by: PHYSICIAN ASSISTANT

## 2019-02-28 PROCEDURE — 99499 RISK ADDL DX/OHS AUDIT: ICD-10-PCS | Mod: S$GLB,,, | Performed by: PHYSICIAN ASSISTANT

## 2019-02-28 PROCEDURE — 1101F PT FALLS ASSESS-DOCD LE1/YR: CPT | Mod: HCNC,CPTII,S$GLB, | Performed by: PHYSICIAN ASSISTANT

## 2019-02-28 PROCEDURE — 3078F PR MOST RECENT DIASTOLIC BLOOD PRESSURE < 80 MM HG: ICD-10-PCS | Mod: HCNC,CPTII,S$GLB, | Performed by: PHYSICIAN ASSISTANT

## 2019-02-28 NOTE — PROGRESS NOTES
SUBJECTIVE:     Chief Complaint & History of Present Illness:  Courtney Plummer is a new patient 83 y.o. female who is seen here today for a bone health evaluation for osteoporosis, fracture prevention, and risk evaluation for future fractures.     she was appropriately identified and referred by Sandra Carlos MD due to concerns for compromised bone quality, and risk of future fractures.  This visit is medically necessary to identify risk, investigate and initiative treatment as appropriate to improve bone quality and strength for the reduction of secondary fractures.     her medical history, medications and fracture history will be reviewed and summarized      Review of patient's allergies indicates:   Allergen Reactions    Sulfa (sulfonamide antibiotics) Anaphylaxis    Macrobid [nitrofurantoin monohyd/m-cryst]      Fever, joint pain    Codeine Anxiety         Current Outpatient Medications   Medication Sig Dispense Refill    BIOTIN ORAL Take by mouth.      cholecalciferal (CHOLECALCIFERAL) 100,000 IU/mL injecttion 1,000 Int'l Units.      estradiol (ESTRACE) 0.01 % (0.1 mg/gram) vaginal cream Place 1 g vaginally twice a week. 42.5 g 1    fluticasone (FLONASE) 50 mcg/actuation nasal spray 2 sprays (100 mcg total) by Each Nare route once daily. 1 Bottle 0    ketoconazole (NIZORAL) 2 % shampoo Apply topically twice a week. 120 mL 5    lidocaine (LIDODERM) 5 % Place 1 patch onto the skin once daily. Remove & Discard patch within 12 hours or as directed by MD 15 patch 0    magnesium oxide (MAG-OX) 400 mg tablet       multivitamin (ONE DAILY MULTIVITAMIN) per tablet Take 1 tablet by mouth once daily. Takes 3x weekly      ondansetron (ZOFRAN-ODT) 4 MG TbDL Take 2 tablets (8 mg total) by mouth every 8 (eight) hours as needed. 12 tablet 0    potassium gluconate 595 mg (99 mg) Tab       triamterene-hydrochlorothiazide 37.5-25 mg (DYAZIDE) 37.5-25 mg per capsule Take 1 capsule by mouth once daily. 1  Capsule Oral Every morning 90 capsule 4    TURMERIC ORAL Take 500 mg by mouth once daily.      diazePAM (VALIUM) 5 MG tablet Take 1 tablet (5 mg total) by mouth every 6 (six) hours as needed (spasm). 15 tablet 0     No current facility-administered medications for this visit.        Past Medical History:   Diagnosis Date    Anxiety     Essential hypertension 2018    Hypertension     Kidney stones 12/3/2015       Past Surgical History:   Procedure Laterality Date    ARTHROSCOPY, KNEE Left 2014    Per[facility]    BACK SURGERY      x3     SECTION, CLASSIC       last     SPINE SURGERY      THYROID SURGERY         Lab Results   Component Value Date     2018    K 4.3 2018    CL 99 2018    CO2 30 (H) 2018    GLU 93 2018    BUN 12 2018    CREATININE 0.8 2018    CALCIUM 9.4 2018    PROT 7.3 2018    ALBUMIN 3.7 2018    BILITOT 0.5 2018    ALKPHOS 69 2018    AST 23 2018    ALT 29 2018    ANIONGAP 8 2018    ESTGFRAFRICA >60 2018    EGFRNONAA >60 2018      Lab Results   Component Value Date    WBC 6.16 2018    RBC 4.22 2018    HGB 11.9 (L) 2018    HCT 37.5 2018    MCV 89 2018    MCH 28.2 2018    MCHC 31.7 (L) 2018    RDW 16.7 (H) 2018     2018    MPV 10.2 2018    GRAN 50.0 2018    LYMPH CANCELED 2018    LYMPH 37.0 2018    MONO CANCELED 2018    MONO 10.0 2018    EOS CANCELED 2018    BASO CANCELED 2018    EOSINOPHIL 3.0 2018    BASOPHIL 0.0 2018    DIFFMETHOD Manual 2018      No results found for: MG   No results found for: PHOS   Lab Results   Component Value Date    [id number] 47 2018      Lab Results   Component Value Date    PTH 58.0 2018      Lab Results   Component Value Date    TSH 1.162 2019       Lab Results   Component Value Date    FREET4 1.25 02/20/2013      No results found for: HGBA1C, ESTIMATEDAVG   No results found for: FREETESTOSTE         Vital Signs (Most Recent)  Vitals:    02/28/19 0953   BP: 123/65   Pulse: 73         Today's Visit and Bone Health History     Have you had any loss of height or gotten shorter since your 20's?  2   Are you postmenopausal?  Naturally   Please indicate at what age you became postmenopausal. 47   Have you ever taken any type of hormone replacement therapy? No   If you have had hormone replacement therapy please indicate which hormone therapy was used and for how long. N/A   Do you currently smoke? No   Have you ever smoked in the past? Yes   How many alcoholic beverages do you drink per day? 0   How many caffeinated beverages do you drink per day? more than 3   Have you had 2 or more falls in the past 12 months? No   How active have you been in the past 12 months? Somewhat active ( walk up to 1 mile per day )   Did either of your parents have a hip fracture after the age of 50? No   Have you ever been diagnosed with any of the following? Fracture   Do you currently have a fracture? No   If you currently have a fracture please indicate where and when the fracture occured. N/A   Have you broken any other bones since you turned 51 or older? Yes   Please list all bones broken since you turned 51 or older. Back   Have you ever had a bone density test or DXA scan? Yes   Are you currently taking or have you ever taken any of the following medications? Fosamax (Alendronate)   Do you take Calcium? No   If you take Calcium what dose? N/A   Do you take Vitamin D? Yes   If you take Vitamin D what dose? 1000 IU  daily   Have you ever been diagnosed with cancer? No   Please indicate what type of cancer and when you were diagnosed. N/A   Have you ever been treated for cancer with high beam radiation or had radioactive implants? No   If you have been treated for cancer with high  "beam radiation or had radioactive implants please indicate what type.       she has medical history and medication use known to be associated with bone loss and osteoporosis to include pathological compression fracture T12, previous diagnosis of osteoporosis has been on Fosamax, spinal fusion L4-L5.  Opioid pain medications SSRIs    The last DXA was performed in 02/14/2017.          T-Score Hip: -2.5     T-Score Spine: -2.5      FRAX:      Major Fx.: 24%         Hip Fx.: 7.7%      Review of Systems:  ROS:  Constitutional: no fever or chills  Eyes: no visual changes  ENT: no nasal congestion or sore throat  Respiratory: no respiratory symptoms  Cardiovascular: no chest pain or palpitations, Positive aortic atherosclerosis  Gastrointestinal: no nausea or vomiting, tolerating diet  Genitourinary: no hematuria or dysuria, History of renal calculi  Integument/Breast: no rash or pruritis  Hematologic/Lymphatic: no easy bruising or lymphadenopathy, History of anemia  Musculoskeletal: positive for arthralgias, back pain, muscle weakness, myalgias and stiff joints, negative for bone pain  Neurological: no seizures or tremors, Positive lumbar stenosis, vertebral compression fraction, peripheral neuropathy,  Behavioral/Psych: no auditory or visual hallucinations, Generalized anxiety disorder, panic attacks  Endocrine: no heat or cold intolerance, Positive thyroid disorder      OBJECTIVE:     PHYSICAL EXAM:  Height: 5' 2" (157.5 cm) Weight: 57.6 kg (126 lb 15.8 oz),                  General: no acute distress and well developed/well nourished  Behavioral/Psych: normal judgment and insight  Skin: no rash  Head/Neck: atraumatic, normocephalic, without obvious abnormality  Respiratory: normal respiratory effort  Cardiac: regular rate and rhythm  Vascular: pulses present  Abdomen: soft, non-tender  Musculoskeletal: no joint tenderness, deformity or swelling               ASSESSMENT/PLAN:     Assessment:    Osteoporosis, at high " risk for future fractures, history of pathological compression fracture.    Plan:   30-45 minutes spent in face-to-face consultation with patient and her family members today, discussing the disease management of osteoporosis, for the reduction of future fractures.  I have explained that bone strength is equal to bone quality. A bone density / DEXA scan is important to complement her care since her fracture was by definition a fragility fracture/traumatic fracture.  Over half of the encounter was spent (50%) counseling the patient on the disease of osteoporosis evidence-based and best practice treatment options available as well as recommendations for improvement of bone quality, the importance of nutritional supplements to include:  Calcium 9562-2923 mg daily in divided doses   Vitamin D3  5794-8174 units daily in divided doses.   Fall prevention and personal safety for the reduction of future fractures.    Clinicians Guidelines for the treatment of Osteoporosis 2017 as part of the National Osteoporosis Foundation were utilized as the evidence-based information provided.    Discussed pharmaceutical treatment options to include Biphosphatases, Denosumab, Abaloparatide and Teriparatide. Information to include indications for therapy, risks and benefits to treatment, and important safety information related to these treatments was provided and discussed.  Handouts were given to the patient for her review on osteoporosis and other pharmacological treatment information related to other available treatment options.    The importance of diet, impact exercise, and core strengthening with gait and balance exercise, through  both formal physical therapy programs and home exercise programs was discussed.     Bone density test recommended and ordered    Will see her back following the DEXA scan to discuss treatment options moving forward  Tymlos ordered

## 2019-03-04 ENCOUNTER — TELEPHONE (OUTPATIENT)
Dept: PHARMACY | Facility: CLINIC | Age: 84
End: 2019-03-04

## 2019-03-06 ENCOUNTER — CLINICAL SUPPORT (OUTPATIENT)
Dept: REHABILITATION | Facility: HOSPITAL | Age: 84
End: 2019-03-06
Attending: INTERNAL MEDICINE
Payer: MEDICARE

## 2019-03-06 DIAGNOSIS — M54.42 CHRONIC LOW BACK PAIN WITH BILATERAL SCIATICA, UNSPECIFIED BACK PAIN LATERALITY: Primary | ICD-10-CM

## 2019-03-06 DIAGNOSIS — M54.41 CHRONIC LOW BACK PAIN WITH BILATERAL SCIATICA, UNSPECIFIED BACK PAIN LATERALITY: Primary | ICD-10-CM

## 2019-03-06 DIAGNOSIS — G89.29 CHRONIC LOW BACK PAIN WITH BILATERAL SCIATICA, UNSPECIFIED BACK PAIN LATERALITY: Primary | ICD-10-CM

## 2019-03-06 PROCEDURE — G8978 MOBILITY CURRENT STATUS: HCPCS | Mod: CK,HCNC,PO

## 2019-03-06 PROCEDURE — 97161 PT EVAL LOW COMPLEX 20 MIN: CPT | Mod: HCNC,PO

## 2019-03-06 PROCEDURE — G8979 MOBILITY GOAL STATUS: HCPCS | Mod: CJ,HCNC,PO

## 2019-03-07 NOTE — TELEPHONE ENCOUNTER
Documentation Only:    Faxed Prior Authorization form and supporting documentation for Tymlos to insurance on 03/07/2019.

## 2019-03-09 ENCOUNTER — OFFICE VISIT (OUTPATIENT)
Dept: URGENT CARE | Facility: CLINIC | Age: 84
End: 2019-03-09
Payer: MEDICARE

## 2019-03-09 VITALS
OXYGEN SATURATION: 98 % | TEMPERATURE: 97 F | HEART RATE: 85 BPM | BODY MASS INDEX: 23.19 KG/M2 | RESPIRATION RATE: 16 BRPM | HEIGHT: 62 IN | WEIGHT: 126 LBS | SYSTOLIC BLOOD PRESSURE: 132 MMHG | DIASTOLIC BLOOD PRESSURE: 64 MMHG

## 2019-03-09 DIAGNOSIS — J01.00 ACUTE NON-RECURRENT MAXILLARY SINUSITIS: ICD-10-CM

## 2019-03-09 DIAGNOSIS — R05.9 COUGH: ICD-10-CM

## 2019-03-09 DIAGNOSIS — J06.9 UPPER RESPIRATORY TRACT INFECTION, UNSPECIFIED TYPE: Primary | ICD-10-CM

## 2019-03-09 LAB
CTP QC/QA: YES
FLUAV AG NPH QL: NEGATIVE
FLUBV AG NPH QL: NEGATIVE

## 2019-03-09 PROCEDURE — 3078F PR MOST RECENT DIASTOLIC BLOOD PRESSURE < 80 MM HG: ICD-10-PCS | Mod: CPTII,S$GLB,, | Performed by: EMERGENCY MEDICINE

## 2019-03-09 PROCEDURE — 3075F PR MOST RECENT SYSTOLIC BLOOD PRESS GE 130-139MM HG: ICD-10-PCS | Mod: CPTII,S$GLB,, | Performed by: EMERGENCY MEDICINE

## 2019-03-09 PROCEDURE — 1101F PR PT FALLS ASSESS DOC 0-1 FALLS W/OUT INJ PAST YR: ICD-10-PCS | Mod: CPTII,S$GLB,, | Performed by: EMERGENCY MEDICINE

## 2019-03-09 PROCEDURE — 3078F DIAST BP <80 MM HG: CPT | Mod: CPTII,S$GLB,, | Performed by: EMERGENCY MEDICINE

## 2019-03-09 PROCEDURE — 1101F PT FALLS ASSESS-DOCD LE1/YR: CPT | Mod: CPTII,S$GLB,, | Performed by: EMERGENCY MEDICINE

## 2019-03-09 PROCEDURE — 99214 OFFICE O/P EST MOD 30 MIN: CPT | Mod: S$GLB,,, | Performed by: EMERGENCY MEDICINE

## 2019-03-09 PROCEDURE — 87804 INFLUENZA ASSAY W/OPTIC: CPT | Mod: QW,S$GLB,, | Performed by: EMERGENCY MEDICINE

## 2019-03-09 PROCEDURE — 87804 POCT INFLUENZA A/B: ICD-10-PCS | Mod: 59,QW,S$GLB, | Performed by: EMERGENCY MEDICINE

## 2019-03-09 PROCEDURE — 99214 PR OFFICE/OUTPT VISIT, EST, LEVL IV, 30-39 MIN: ICD-10-PCS | Mod: S$GLB,,, | Performed by: EMERGENCY MEDICINE

## 2019-03-09 PROCEDURE — 3075F SYST BP GE 130 - 139MM HG: CPT | Mod: CPTII,S$GLB,, | Performed by: EMERGENCY MEDICINE

## 2019-03-09 NOTE — PATIENT INSTRUCTIONS
Go to the Emergency Room if symptoms or condition worsens in any way    Zyrtec, Claritin, or Allegra OTC as directed for the next 7 days    Flonase OTC as directed for the next 7 days    Salt Water Nasal Spray OTC 3x/day for the next 7 days    Tylenol 500mg 2 tabs by mouth every 8 hours  Motrin 400mg 2 tabs by mouth every 8 hours  Alternate Tylenol and Motrin every 4hours    Mucinex or Robitussin OTC as directed for cough      Viral Upper Respiratory Illness (Adult)  You have a viral upper respiratory illness (URI), which is another term for the common cold. This illness is contagious during the first few days. It is spread through the air by coughing and sneezing. It may also be spread by direct contact (touching the sick person and then touching your own eyes, nose, or mouth). Frequent handwashing will decrease risk of spread. Most viral illnesses go away within 7 to 10 days with rest and simple home remedies. Sometimes the illness may last for several weeks. Antibiotics will not kill a virus, and they are generally not prescribed for this condition.    Home care  · If symptoms are severe, rest at home for the first 2 to 3 days. When you resume activity, don't let yourself get too tired.  · Avoid being exposed to cigarette smoke (yours or others).  · You may use acetaminophen or ibuprofen to control pain and fever, unless another medicine was prescribed. (Note: If you have chronic liver or kidney disease, have ever had a stomach ulcer or gastrointestinal bleeding, or are taking blood-thinning medicines, talk with your healthcare provider before using these medicines.) Aspirin should never be given to anyone under 18 years of age who is ill with a viral infection or fever. It may cause severe liver or brain damage.  · Your appetite may be poor, so a light diet is fine. Avoid dehydration by drinking 6 to 8 glasses of fluids per day (water, soft drinks, juices, tea, or soup). Extra fluids will help loosen  secretions in the nose and lungs.  · Over-the-counter cold medicines will not shorten the length of time youre sick, but they may be helpful for the following symptoms: cough, sore throat, and nasal and sinus congestion. (Note: Do not use decongestants if you have high blood pressure.)  Follow-up care  Follow up with your healthcare provider, or as advised.  When to seek medical advice  Call your healthcare provider right away if any of these occur:  · Cough with lots of colored sputum (mucus)  · Severe headache; face, neck, or ear pain  · Difficulty swallowing due to throat pain  · Fever of 100.4°F (38°C)  Call 911, or get immediate medical care  Call emergency services right away if any of these occur:  · Chest pain, shortness of breath, wheezing, or difficulty breathing  · Coughing up blood  · Inability to swallow due to throat pain  Date Last Reviewed: 9/13/2015  © 4693-7852 [employer], [employer]. [address] [place] [address], [place] [address]. All rights reserved. This information is not intended as a substitute for professional medical care. Always follow your healthcare professional's instructions.

## 2019-03-09 NOTE — PROGRESS NOTES
"Subjective:       Patient ID: Courtney Plummer is a 83 y.o. female.    Vitals:    03/09/19 1204   BP: 132/64   Pulse: 85   Resp: 16   Temp: 97.3 °F (36.3 °C)   SpO2: 98%   Weight: 57.2 kg (126 lb)   Height: 5' 2" (1.575 m)       Chief Complaint: Cough    Pt states sinus and chest congestion with productive cough x 3 days.      Cough   This is a new problem. The current episode started in the past 7 days. The problem has been unchanged. The problem occurs every few minutes. The cough is productive of sputum. Associated symptoms include chills, nasal congestion and postnasal drip. Pertinent negatives include no chest pain, fever, headaches, rash, sore throat or shortness of breath. Nothing aggravates the symptoms. Treatments tried: vitamin C. The treatment provided no relief.     Review of Systems   Constitution: Positive for chills. Negative for fever.   HENT: Positive for congestion and postnasal drip. Negative for sore throat.    Eyes: Negative for blurred vision.   Cardiovascular: Negative for chest pain.   Respiratory: Positive for cough and sputum production. Negative for shortness of breath.    Skin: Negative for rash.   Musculoskeletal: Negative for back pain and joint pain.   Gastrointestinal: Negative for abdominal pain, diarrhea, nausea and vomiting.   Neurological: Negative for headaches.   Psychiatric/Behavioral: The patient is not nervous/anxious.        Objective:      Physical Exam   Constitutional: She is oriented to person, place, and time. She appears well-developed and well-nourished. She is cooperative.  Non-toxic appearance. She does not appear ill. No distress.   HENT:   Head: Normocephalic and atraumatic.   Right Ear: Hearing, tympanic membrane, external ear and ear canal normal.   Left Ear: Hearing, tympanic membrane, external ear and ear canal normal.   Nose: Mucosal edema and rhinorrhea present. No nasal deformity. No epistaxis. Right sinus exhibits no maxillary sinus tenderness and no " frontal sinus tenderness. Left sinus exhibits no maxillary sinus tenderness and no frontal sinus tenderness.   Mouth/Throat: Uvula is midline, oropharynx is clear and moist and mucous membranes are normal. No trismus in the jaw. Normal dentition. No uvula swelling. No posterior oropharyngeal erythema.   Eyes: Conjunctivae and lids are normal. No scleral icterus.   Sclera clear bilat   Neck: Trachea normal, full passive range of motion without pain and phonation normal. Neck supple.   Cardiovascular: Normal rate, regular rhythm, normal heart sounds, intact distal pulses and normal pulses.   Pulmonary/Chest: Effort normal and breath sounds normal. No respiratory distress.   fequent cough on exam   Abdominal: Soft. Normal appearance and bowel sounds are normal. She exhibits no distension. There is no tenderness.   Musculoskeletal: Normal range of motion. She exhibits no edema or deformity.   Neurological: She is alert and oriented to person, place, and time. She exhibits normal muscle tone. Coordination normal.   Skin: Skin is warm, dry and intact. She is not diaphoretic. No pallor.   Psychiatric: She has a normal mood and affect. Her speech is normal and behavior is normal. Judgment and thought content normal. Cognition and memory are normal.   Nursing note and vitals reviewed.      Assessment:       1. Upper respiratory tract infection, unspecified type    2. Cough    3. Acute non-recurrent maxillary sinusitis        Plan:       Courtney was seen today for cough.    Diagnoses and all orders for this visit:    Upper respiratory tract infection, unspecified type    Cough  -     POCT Influenza A/B    Acute non-recurrent maxillary sinusitis    .  Patient Instructions     Go to the Emergency Room if symptoms or condition worsens in any way    Zyrtec, Claritin, or Allegra OTC as directed for the next 7 days    Flonase OTC as directed for the next 7 days    Salt Water Nasal Spray OTC 3x/day for the next 7 days    Tylenol  500mg 2 tabs by mouth every 8 hours  Motrin 400mg 2 tabs by mouth every 8 hours  Alternate Tylenol and Motrin every 4hours    Mucinex or Robitussin OTC as directed for cough      Viral Upper Respiratory Illness (Adult)  You have a viral upper respiratory illness (URI), which is another term for the common cold. This illness is contagious during the first few days. It is spread through the air by coughing and sneezing. It may also be spread by direct contact (touching the sick person and then touching your own eyes, nose, or mouth). Frequent handwashing will decrease risk of spread. Most viral illnesses go away within 7 to 10 days with rest and simple home remedies. Sometimes the illness may last for several weeks. Antibiotics will not kill a virus, and they are generally not prescribed for this condition.    Home care  · If symptoms are severe, rest at home for the first 2 to 3 days. When you resume activity, don't let yourself get too tired.  · Avoid being exposed to cigarette smoke (yours or others).  · You may use acetaminophen or ibuprofen to control pain and fever, unless another medicine was prescribed. (Note: If you have chronic liver or kidney disease, have ever had a stomach ulcer or gastrointestinal bleeding, or are taking blood-thinning medicines, talk with your healthcare provider before using these medicines.) Aspirin should never be given to anyone under 18 years of age who is ill with a viral infection or fever. It may cause severe liver or brain damage.  · Your appetite may be poor, so a light diet is fine. Avoid dehydration by drinking 6 to 8 glasses of fluids per day (water, soft drinks, juices, tea, or soup). Extra fluids will help loosen secretions in the nose and lungs.  · Over-the-counter cold medicines will not shorten the length of time youre sick, but they may be helpful for the following symptoms: cough, sore throat, and nasal and sinus congestion. (Note: Do not use decongestants if you  have high blood pressure.)  Follow-up care  Follow up with your healthcare provider, or as advised.  When to seek medical advice  Call your healthcare provider right away if any of these occur:  · Cough with lots of colored sputum (mucus)  · Severe headache; face, neck, or ear pain  · Difficulty swallowing due to throat pain  · Fever of 100.4°F (38°C)  Call 911, or get immediate medical care  Call emergency services right away if any of these occur:  · Chest pain, shortness of breath, wheezing, or difficulty breathing  · Coughing up blood  · Inability to swallow due to throat pain  Date Last Reviewed: 9/13/2015  © 8149-8839 [employer]. [address] [place] [address], [place] [address]. All rights reserved. This information is not intended as a substitute for professional medical care. Always follow your healthcare professional's instructions.

## 2019-03-14 DIAGNOSIS — M81.6 LOCALIZED OSTEOPOROSIS OF LEQUESNE: ICD-10-CM

## 2019-03-14 DIAGNOSIS — M81.0 OSTEOPOROSIS, UNSPECIFIED OSTEOPOROSIS TYPE, UNSPECIFIED PATHOLOGICAL FRACTURE PRESENCE: ICD-10-CM

## 2019-03-14 DIAGNOSIS — M80.80XA PATHOLOGICAL FRACTURE DUE TO OSTEOPOROSIS, UNSPECIFIED FRACTURE SITE, UNSPECIFIED OSTEOPOROSIS TYPE, INITIAL ENCOUNTER: Primary | ICD-10-CM

## 2019-03-14 RX ORDER — TERIPARATIDE 250 UG/ML
20 INJECTION, SOLUTION SUBCUTANEOUS DAILY
Qty: 2.4 ML | Refills: 11 | Status: SHIPPED | OUTPATIENT
Start: 2019-03-14 | End: 2019-12-30 | Stop reason: SDUPTHER

## 2019-03-15 ENCOUNTER — TELEPHONE (OUTPATIENT)
Dept: PHARMACY | Facility: CLINIC | Age: 84
End: 2019-03-15

## 2019-03-18 RX ORDER — AMOXICILLIN 875 MG/1
875 TABLET, FILM COATED ORAL 2 TIMES DAILY
COMMUNITY
Start: 2019-03-12 | End: 2019-03-22

## 2019-03-18 RX ORDER — AMPICILLIN TRIHYDRATE 500 MG
1000 CAPSULE ORAL DAILY
COMMUNITY
Start: 2019-02-24

## 2019-03-19 NOTE — TELEPHONE ENCOUNTER
Documentation Only:    Faxed Prior Authorization form and supporting documentation for Forteo to insurance on 03/19/2019.

## 2019-03-22 ENCOUNTER — CLINICAL SUPPORT (OUTPATIENT)
Dept: REHABILITATION | Facility: HOSPITAL | Age: 84
End: 2019-03-22
Attending: INTERNAL MEDICINE
Payer: MEDICARE

## 2019-03-22 DIAGNOSIS — G89.29 CHRONIC MIDLINE LOW BACK PAIN WITH SCIATICA, SCIATICA LATERALITY UNSPECIFIED: ICD-10-CM

## 2019-03-22 DIAGNOSIS — M54.40 CHRONIC MIDLINE LOW BACK PAIN WITH SCIATICA, SCIATICA LATERALITY UNSPECIFIED: ICD-10-CM

## 2019-03-22 PROCEDURE — 97110 THERAPEUTIC EXERCISES: CPT | Mod: HCNC,PO

## 2019-03-22 NOTE — PROGRESS NOTES
"  Physical Therapy Daily Treatment Note     Name: Courtney Plummer  Clinic Number: [id number]    Therapy Diagnosis:   Encounter Diagnosis   Name Primary?    Chronic midline low back pain with sciatica, sciatica laterality unspecified      Physician: Courtney Carlos MD    Visit Date: 3/22/2019  Medical Diagnosis: thoracic pain, deconditioning  Evaluation Date: 3-6-19  SD due: 4-6-19  Authorization Period Expiration: 12-31-19  Plan of Care Certification Period: 5-6-19      Visit #/Visits authorized: 2/ 12    Time In: 0900   Time Out: 1000  Total Billable Time: 30 minutes  TE  Precautions: osteoporosis, impact restrictions, spine motion restriction.    Subjective     Pt reports: she was sick right after the eval so she had to cancel those appts. Feels fine today , sometimes has back "achiness" but no real pain at present.  She was not compliant with home exercise program, due to being ill.    Patient reports their pain to be 0/10 on a 0-10 scale with 0 being no pain and 10 being the worst pain imaginable.    Functional change: none.      Objective     Courtney received therapeutic exercises per flowsheet to develop strength, endurance, flexibility, posture and core stabilization for 30 minutes.    Bike L0 [id number] warm up    Slantboard 3x30  Lateral 4in step overs 3x10  Standing hip ABD 2x10,   knee flexion 2x10  Heel raises 3x10  ,marching 3x10    Bronze chair with foam pad sit<>stands 3x10  Stair HF stretch 3x30    NEXT VISIT:  - review and complete HEP in full as pt has not done yet.        Written Home Exercises Provided: Patient instructed to cont prior HEP.    Courtney demonstrated good  understanding of the education provided, will reassess at [place].      Assessment     Pt with good tolerance to activities completed today. Noted B LE fatigue but now pain. Said her back felt better at the end of PT session. Remarks she is working to improve her posture.  Courtney is progressing well towards her goals.   Pt " prognosis is Excellent.     Pt will continue to benefit from skilled outpatient physical therapy to address the deficits listed in the problem list box on initial evaluation, provide pt/family education and to maximize pt's level of independence in the home and community environment.     Pt's spiritual, cultural and educational needs considered and pt agreeable to plan of care and goals.    Anticipated barriers to physical therapy: none      Goals:   Short Term Goals: 2 weeks   1: Begin aquatic exercise program  2: Become profficiaent with HEP for LE strengthening  3: improve tug score to 12 sec     Long Term Goals: 6 weeks   1: Pt report community ambulation for more than 1 hour without need to rest or onset of pain.  2: Functional reach with foam to 12 inches without LOB or fear to demonstrate improved balance for improved safety with ADLs  3: Tug score improve to 10 sec.      Plan   Cont per POC.    Latanya Todd, PT

## 2019-03-25 ENCOUNTER — APPOINTMENT (OUTPATIENT)
Dept: RADIOLOGY | Facility: CLINIC | Age: 84
End: 2019-03-25
Attending: INTERNAL MEDICINE
Payer: MEDICARE

## 2019-03-25 PROCEDURE — 77080 DEXA BONE DENSITY SPINE HIP: ICD-10-PCS | Mod: 26,,, | Performed by: INTERNAL MEDICINE

## 2019-03-25 PROCEDURE — 77080 DXA BONE DENSITY AXIAL: CPT | Mod: 26,,, | Performed by: INTERNAL MEDICINE

## 2019-03-25 PROCEDURE — 77080 DXA BONE DENSITY AXIAL: CPT | Mod: TC,HCNC,PO

## 2019-03-28 ENCOUNTER — OFFICE VISIT (OUTPATIENT)
Dept: ORTHOPEDICS | Facility: CLINIC | Age: 84
End: 2019-03-28
Payer: MEDICARE

## 2019-03-28 VITALS
SYSTOLIC BLOOD PRESSURE: 128 MMHG | WEIGHT: 127.44 LBS | BODY MASS INDEX: 23.45 KG/M2 | HEIGHT: 62 IN | HEART RATE: 64 BPM | DIASTOLIC BLOOD PRESSURE: 62 MMHG

## 2019-03-28 DIAGNOSIS — M81.0 OSTEOPOROSIS, UNSPECIFIED OSTEOPOROSIS TYPE, UNSPECIFIED PATHOLOGICAL FRACTURE PRESENCE: ICD-10-CM

## 2019-03-28 DIAGNOSIS — M80.80XA PATHOLOGICAL FRACTURE DUE TO OSTEOPOROSIS, UNSPECIFIED FRACTURE SITE, UNSPECIFIED OSTEOPOROSIS TYPE, INITIAL ENCOUNTER: Primary | ICD-10-CM

## 2019-03-28 DIAGNOSIS — M81.6 LOCALIZED OSTEOPOROSIS OF LEQUESNE: ICD-10-CM

## 2019-03-28 PROCEDURE — 99213 PR OFFICE/OUTPT VISIT, EST, LEVL III, 20-29 MIN: ICD-10-PCS | Mod: HCNC,S$GLB,, | Performed by: PHYSICIAN ASSISTANT

## 2019-03-28 PROCEDURE — 1101F PR PT FALLS ASSESS DOC 0-1 FALLS W/OUT INJ PAST YR: ICD-10-PCS | Mod: HCNC,CPTII,S$GLB, | Performed by: PHYSICIAN ASSISTANT

## 2019-03-28 PROCEDURE — 3074F PR MOST RECENT SYSTOLIC BLOOD PRESSURE < 130 MM HG: ICD-10-PCS | Mod: HCNC,CPTII,S$GLB, | Performed by: PHYSICIAN ASSISTANT

## 2019-03-28 PROCEDURE — 99213 OFFICE O/P EST LOW 20 MIN: CPT | Mod: HCNC,S$GLB,, | Performed by: PHYSICIAN ASSISTANT

## 2019-03-28 PROCEDURE — 3078F DIAST BP <80 MM HG: CPT | Mod: HCNC,CPTII,S$GLB, | Performed by: PHYSICIAN ASSISTANT

## 2019-03-28 PROCEDURE — 99999 PR PBB SHADOW E&M-EST. PATIENT-LVL III: CPT | Mod: PBBFAC,HCNC,, | Performed by: PHYSICIAN ASSISTANT

## 2019-03-28 PROCEDURE — 3074F SYST BP LT 130 MM HG: CPT | Mod: HCNC,CPTII,S$GLB, | Performed by: PHYSICIAN ASSISTANT

## 2019-03-28 PROCEDURE — 1101F PT FALLS ASSESS-DOCD LE1/YR: CPT | Mod: HCNC,CPTII,S$GLB, | Performed by: PHYSICIAN ASSISTANT

## 2019-03-28 PROCEDURE — 99999 PR PBB SHADOW E&M-EST. PATIENT-LVL III: ICD-10-PCS | Mod: PBBFAC,HCNC,, | Performed by: PHYSICIAN ASSISTANT

## 2019-03-28 PROCEDURE — 3078F PR MOST RECENT DIASTOLIC BLOOD PRESSURE < 80 MM HG: ICD-10-PCS | Mod: HCNC,CPTII,S$GLB, | Performed by: PHYSICIAN ASSISTANT

## 2019-04-05 ENCOUNTER — TELEPHONE (OUTPATIENT)
Dept: INTERNAL MEDICINE | Facility: CLINIC | Age: 84
End: 2019-04-05

## 2019-04-05 ENCOUNTER — PATIENT MESSAGE (OUTPATIENT)
Dept: INTERNAL MEDICINE | Facility: CLINIC | Age: 84
End: 2019-04-05

## 2019-04-08 ENCOUNTER — TELEPHONE (OUTPATIENT)
Dept: PHARMACY | Facility: CLINIC | Age: 84
End: 2019-04-08

## 2019-04-08 ENCOUNTER — TELEPHONE (OUTPATIENT)
Dept: INTERNAL MEDICINE | Facility: CLINIC | Age: 84
End: 2019-04-08

## 2019-04-08 NOTE — TELEPHONE ENCOUNTER
Forteo is approved by the patient's insurance.  We will reach out to the patient to notify of the approval, offer consultation, and schedule a delivery of the medication.     Sending a staff message to Dr Santos regarding approval

## 2019-04-08 NOTE — TELEPHONE ENCOUNTER
FOR DOCUMENTATION ONLY:  Financial Assistance for Forteo is approved from 2-24-19 to 2-23-19  [facility]  BIN: [id number]  PCN: [id number]  Id: [id number]  GRP:[id number]  1000.00 Chris    Sending a staff message to Dr Santos regarding approval

## 2019-04-08 NOTE — TELEPHONE ENCOUNTER
FOR DOCUMENTATION ONLY:  Financial Assistance for Forteo is approved from 2-24-19 to 2-23-19  [facility]  BIN: [id number]  PCN: [id number]  Id: [id number]  GRP:[id number]  1000.00 Chris

## 2019-04-08 NOTE — TELEPHONE ENCOUNTER
Attempted to contact patient in regards to Forteo initial consultation/shipment. She was currently grocery shopping and requested I return her call around 2:30pm.

## 2019-04-08 NOTE — TELEPHONE ENCOUNTER
Returned the patient's call in regards to Forteo initial consult/shipment. She will come to [facility] to  [facility] for a $0 copay tomorrow 4/9 at 1pm. Provided [facility] address and directions both verbally and via [employer].

## 2019-04-08 NOTE — TELEPHONE ENCOUNTER
Dear Nurse,  Malgorzata BarryJennifer Santos has already taken care of this.  Sincerely, Dr. Courtney Carlos

## 2019-04-09 NOTE — TELEPHONE ENCOUNTER
Initial Forteo consult completed on 4/9/19 with the patient at [facility]. $ 0 copay. Patient plans to start Forteo on 4/9/19 in the evening. Provided Forteo Connect  support number ([phone]).    Counseled patient on administration directions:  -Keep your FORTEO delivery device in the refrigerator between 36° to 46°F (2° to 8°C).  -Do not use FORTEO after the expiration date printed on the delivery device and packaging. Throw away the -FORTEO delivery device after 28 days even if it has medicine in it   - Monthly RX will come with pen needles and alcohol swabs.  - Patient may inject in either the tops of the thighs or abdomen- but at least 2 inches away from belly button.  Patient was instructed to rotate injections sites.  - Patient is to wipe down the injection site with the alcohol pad, wait to dry.    1. Pull off white cap,   2. Screw on new pen needle, remove outer cap.   3. Set dose by pulling out black injection button until it stops - make sure red line shows.  4. Remove inner needle cover.  5. Gently squeeze the area of the cleaned skin and hold it firmly. Insert needle straight into the skin. Push black injection button until it stops - hold and count to 5 to ensure complete dose administered.  6. Pull the needle from skin and confirm dose - black button all the way in confirms full dose administered.  7. Use large needle cover to remove needle safely and recap.    - Patient will use sharps container to discard all injectable items; once full, per [place] law, she may lock the sharps container and place in her trash. She can then contact the Pharmacy and we will replace the sharps at no additional charge.    DDIs: Medication list reviewed. No DDIs or allergies    Patient was counseled on possible side effects: dizziness, joint pain, upset stomach.  Patient advised to administer injection while seated and to watch for symptoms of orthostatic hypotension.    Patient informed that online website has  step-by-step instruction video and a 24 hour nursing contact number - sent Tapit message with this information. Reviewed [facility] background (hours, no auto refills, RPh on call 24/7, etc). The patient has a PMH of a compression fracture and is motivated to take Forteo to help prevent further fractures. She is looking forward to attending her granddaughter's wedding in May. She does not have any prior history of injectable medications - reviewed injection technique in-depth. Encouraged her to continue existing Calcium + Vit D supplements. All questions answered and addressed to patients satisfaction. I will f/u with her in 1 week from start, [facility] to contact patient in 3 weeks for refills.

## 2019-04-16 NOTE — TELEPHONE ENCOUNTER
We will be assisting the patient in applying to [facility] for her Forteo prescription. Faxing the assistance application prescription to Eusebio CARDONA for his review and signature.

## 2019-04-17 NOTE — TELEPHONE ENCOUNTER
Contacted patient in regards to initial Forteo clinical follow up. Confirmed she initiated the medication on 4/9.     Administration: Patient confirms taking medication as prescribed: Inject Forteo one time daily. She injects about one hour before bedtime. She is injecting into her abdomen and rotates injection sites each time. She uses a new needle for each injection and discards into the sharps container.  Storage: Medication is stored in the refrigerator.  Side effects/disease state management: Patient denies the majority of side effects from Forteo. She has noticed minor nausea but is not bothered significantly.    Ms. Plummer is overall pleased with how well she is tolerating Forteo at this time. Transferred her to miss Danielle to discuss successful enrollment into a new osiris that will cover her copay moving forward. Encouraged her to contact [facility] with any questions/concerns.

## 2019-04-18 NOTE — TELEPHONE ENCOUNTER
FOR DOCUMENTATION ONLY:  Financial Assistance for Forteo is approved from 4-18-19 to 4-18-20  [facility] [employer]  BIN: [id number]  PCN: [id number]  Id: [id number]  [facility]: [id number]  1000.00 osiris

## 2019-05-24 NOTE — TELEPHONE ENCOUNTER
FOR DOCUMENTATION ONLY:  Financial Assistance for Forteo is approved from 5-22-19 to 12-31-19  Source [employer]  Sending a staff message to Eusebio Santos

## 2019-08-09 ENCOUNTER — TELEPHONE (OUTPATIENT)
Dept: INTERNAL MEDICINE | Facility: CLINIC | Age: 84
End: 2019-08-09

## 2019-08-09 NOTE — TELEPHONE ENCOUNTER
----  Contact: PT   PT calling in to be seen for frequent urination / Pressure , she would like to be seen before the next available which is 09/17.    Pt can be reached at and would like a nurse to call her at [phone]      Thanks

## 2019-08-10 ENCOUNTER — LAB VISIT (OUTPATIENT)
Dept: LAB | Facility: HOSPITAL | Age: 84
End: 2019-08-10
Attending: INTERNAL MEDICINE
Payer: MEDICARE

## 2019-08-10 ENCOUNTER — TELEPHONE (OUTPATIENT)
Dept: INTERNAL MEDICINE | Facility: CLINIC | Age: 84
End: 2019-08-10

## 2019-08-10 DIAGNOSIS — N39.0 URINARY TRACT INFECTION WITHOUT HEMATURIA, SITE UNSPECIFIED: Primary | ICD-10-CM

## 2019-08-10 DIAGNOSIS — N39.0 RECURRENT UTI: ICD-10-CM

## 2019-08-10 DIAGNOSIS — N39.0 URINARY TRACT INFECTION WITHOUT HEMATURIA, SITE UNSPECIFIED: ICD-10-CM

## 2019-08-10 LAB
BACTERIA #/AREA URNS AUTO: ABNORMAL /HPF
BILIRUB UR QL STRIP: NEGATIVE
CLARITY UR REFRACT.AUTO: ABNORMAL
COLOR UR AUTO: YELLOW
GLUCOSE UR QL STRIP: NEGATIVE
HGB UR QL STRIP: NEGATIVE
KETONES UR QL STRIP: NEGATIVE
LEUKOCYTE ESTERASE UR QL STRIP: ABNORMAL
MICROSCOPIC COMMENT: ABNORMAL
NITRITE UR QL STRIP: POSITIVE
PH UR STRIP: >8 [PH] (ref 5–8)
PROT UR QL STRIP: NEGATIVE
RBC #/AREA URNS AUTO: 1 /HPF (ref 0–4)
SP GR UR STRIP: 1.01 (ref 1–1.03)
SQUAMOUS #/AREA URNS AUTO: 0 /HPF
URN SPEC COLLECT METH UR: ABNORMAL
WBC #/AREA URNS AUTO: >100 /HPF (ref 0–5)

## 2019-08-10 PROCEDURE — 81001 URINALYSIS AUTO W/SCOPE: CPT | Mod: HCNC

## 2019-08-10 PROCEDURE — 87186 SC STD MICRODIL/AGAR DIL: CPT | Mod: HCNC

## 2019-08-10 PROCEDURE — 87088 URINE BACTERIA CULTURE: CPT | Mod: HCNC

## 2019-08-10 PROCEDURE — 87077 CULTURE AEROBIC IDENTIFY: CPT | Mod: HCNC

## 2019-08-10 PROCEDURE — 87086 URINE CULTURE/COLONY COUNT: CPT | Mod: HCNC

## 2019-08-10 RX ORDER — AMOXICILLIN AND CLAVULANATE POTASSIUM 875; 125 MG/1; MG/1
1 TABLET, FILM COATED ORAL 2 TIMES DAILY
Qty: 20 TABLET | Refills: 0 | Status: SHIPPED | OUTPATIENT
Start: 2019-08-10 | End: 2019-11-12 | Stop reason: ALTCHOICE

## 2019-08-10 NOTE — TELEPHONE ENCOUNTER
----- Message from Cielo Rose sent at 8/10/2019 10:26 AM CDT -----  Contact: Pt [phone]  Patient would like to get medical advice.  Symptoms (please be specific):  Frequent urination, pressure, odor and cloudy urine, and tiredness  How long has patient had these symptoms:  1 week  Pharmacy name and phone # [facility] - [place] - [address] [place] [address] [phone] (Phone) [phone] (Fax)

## 2019-08-12 ENCOUNTER — TELEPHONE (OUTPATIENT)
Dept: PHARMACY | Facility: CLINIC | Age: 84
End: 2019-08-12

## 2019-08-12 LAB — BACTERIA UR CULT: ABNORMAL

## 2019-08-12 NOTE — TELEPHONE ENCOUNTER
"Patient called pharmacy to discuss possible side effects of Forteo.  She reports feeling "out of it" with uncertainty and confusion 1-2 weeks after starting Forteo in April.  When asked if its dizziness or veritgo, she stated it is hard to describe.  The issue is hindering her quality of life and she would like it resolved. She would like [facility] to reach out to provider on her behalf.  "

## 2019-08-13 NOTE — TELEPHONE ENCOUNTER
Pt called back. She did a UA and the results are in. She is currently taking acetaminophen and Augmentin. Pt stated her symptoms are easing up a bit since she began taking the medications.Gladys Wilde LPN

## 2019-08-15 ENCOUNTER — TELEPHONE (OUTPATIENT)
Dept: INTERNAL MEDICINE | Facility: CLINIC | Age: 84
End: 2019-08-15

## 2019-08-15 NOTE — TELEPHONE ENCOUNTER
Spoke with pt she stated she is doing well and is taking the medications prescribed.Gladys Wilde LPN

## 2019-08-15 NOTE — TELEPHONE ENCOUNTER
----- Message from Josi Upton sent at 8/15/2019  8:25 AM CDT -----  Contact: [employer] request  Message     Appointment Request From: Courtney Plummer    With Provider: Courtney Yang MD [[facility] - Internal Medicine]    Preferred Date Range: 8/7/2019 - 8/30/2019    Preferred Times: Any time    Reason for visit: Various health issues    Comments:  Frequent urination.  Tiredness.  Anxiety.

## 2019-08-20 ENCOUNTER — TELEPHONE (OUTPATIENT)
Dept: INTERNAL MEDICINE | Facility: CLINIC | Age: 84
End: 2019-08-20

## 2019-08-20 NOTE — TELEPHONE ENCOUNTER
----- Message from Beatriz Vale sent at 8/20/2019 11:18 AM CDT -----  Contact: Patient [phone]  Prescription Request:     Name of medication: paroxetine (PAXIL) 10 MG tablet     Reason for request: Refill    Pharmacy: [facility] ([place] - [place]  [address]    Stated that she wants name brand for a 30 day supply. Also wanted you to know that she is taking Rx teriparatide (FORTEO) 20 mcg/dose - 600 mcg/2.4 mL PnIj    Thank You

## 2019-08-22 ENCOUNTER — TELEPHONE (OUTPATIENT)
Dept: INTERNAL MEDICINE | Facility: CLINIC | Age: 84
End: 2019-08-22

## 2019-08-22 NOTE — TELEPHONE ENCOUNTER
Contact: Patient [phone]  2nd Request    Prescription Request:      Name of medication: paroxetine (PAXIL) 10 MG tablet      Reason for request: Refill     Pharmacy: [facility] ([place] - [address]     Stated that she wants name brand for a 30 day supply. Also wanted you to know that she is taking Rx teriparatide (FORTEO) 20 mcg/dose - 600 mcg/2.4 mL PnIj     Thank You

## 2019-08-22 NOTE — TELEPHONE ENCOUNTER
The last active prescription for paxil for this patient was from 2/2017 with sufficient refills for 6 months. Please check with patient as to whether she has been taking this medication in the past year, and if so which doctor has been prescribing it.    Since this doesn't appear to be a recent or active medication, will defer sending new rx to Dr Carlos.  Please let patient know.

## 2019-08-23 ENCOUNTER — TELEPHONE (OUTPATIENT)
Dept: INTERNAL MEDICINE | Facility: CLINIC | Age: 84
End: 2019-08-23

## 2019-08-23 RX ORDER — PAROXETINE 10 MG/1
10 TABLET, FILM COATED ORAL EVERY MORNING
Qty: 30 TABLET | Refills: 11 | Status: SHIPPED | OUTPATIENT
Start: 2019-08-23 | End: 2019-08-28 | Stop reason: SDUPTHER

## 2019-08-23 NOTE — TELEPHONE ENCOUNTER
----- Message from Beatriz Vale sent at 8/23/2019 10:47 AM CDT -----  Contact: Patieint   Patient stated that she has not been taking it for the past year. Stated that she is experiencing anxiety that is why she is requesting it. Advised her that Dr. Carlos was out until Tuesday.    Please call and advise.    Thank You

## 2019-08-28 ENCOUNTER — TELEPHONE (OUTPATIENT)
Dept: INTERNAL MEDICINE | Facility: CLINIC | Age: 84
End: 2019-08-28

## 2019-08-28 DIAGNOSIS — F41.1 GENERALIZED ANXIETY DISORDER: Primary | ICD-10-CM

## 2019-08-28 RX ORDER — PAROXETINE 10 MG/1
10 TABLET, FILM COATED ORAL EVERY MORNING
Qty: 90 TABLET | Refills: 3 | Status: SHIPPED | OUTPATIENT
Start: 2019-08-28 | End: 2019-10-09 | Stop reason: SDUPTHER

## 2019-08-28 NOTE — TELEPHONE ENCOUNTER
Contact: Patient [phone]  Patient is unable to pay the cost of paroxetine (PAXIL) 10 MG tablet $210.00 for a 30 day supply.  Patient would like to be prescribed something else.  Unable to take escal.    Patient is requesting a callback to discuss before medication is ordered.    Please call and advise  Thank you

## 2019-08-28 NOTE — TELEPHONE ENCOUNTER
This might be because she requested brand name only Paxil.  She might have to settle on the generic  but  I sent the prescription to the [facility] to see if they can get it approved

## 2019-09-20 ENCOUNTER — TELEPHONE (OUTPATIENT)
Dept: PHARMACY | Facility: CLINIC | Age: 84
End: 2019-09-20

## 2019-09-24 ENCOUNTER — PES CALL (OUTPATIENT)
Dept: ADMINISTRATIVE | Facility: CLINIC | Age: 84
End: 2019-09-24

## 2019-10-09 DIAGNOSIS — F41.1 GENERALIZED ANXIETY DISORDER: ICD-10-CM

## 2019-10-09 RX ORDER — PAROXETINE 10 MG/1
10 TABLET, FILM COATED ORAL EVERY MORNING
Qty: 90 TABLET | Refills: 0 | Status: SHIPPED | OUTPATIENT
Start: 2019-10-09 | End: 2019-11-12 | Stop reason: SDUPTHER

## 2019-10-09 NOTE — TELEPHONE ENCOUNTER
----- Message from Jeimy Elmore sent at 10/9/2019  8:14 AM CDT -----  Contact: self/ [phone]  Patient is calling for an RX refill or new RX.  Is this a refill or new RX:    RX name and strength: paroxetine (PAXIL) 10 MG tablet 90 tablet Directions (copy/paste from chart):  Take 1 tablet (10 mg total) by mouth every morning  Is this a 30 day or 90 day RX:    Local pharmacy or mail order pharmacy:    Pharmacy name and phone # (copy/paste from chart):  [facility] ([place] - [place] - [address] [place] [phone] (Phone)  [phone] (Fax)   Comments:

## 2019-10-29 ENCOUNTER — PATIENT OUTREACH (OUTPATIENT)
Dept: ADMINISTRATIVE | Facility: HOSPITAL | Age: 84
End: 2019-10-29

## 2019-11-12 ENCOUNTER — OFFICE VISIT (OUTPATIENT)
Dept: INTERNAL MEDICINE | Facility: CLINIC | Age: 84
End: 2019-11-12
Payer: MEDICARE

## 2019-11-12 VITALS
HEART RATE: 74 BPM | BODY MASS INDEX: 24.36 KG/M2 | DIASTOLIC BLOOD PRESSURE: 70 MMHG | SYSTOLIC BLOOD PRESSURE: 150 MMHG | WEIGHT: 132.38 LBS | HEIGHT: 62 IN | OXYGEN SATURATION: 95 %

## 2019-11-12 DIAGNOSIS — I70.0 ATHEROSCLEROSIS OF AORTA: ICD-10-CM

## 2019-11-12 DIAGNOSIS — I10 ESSENTIAL HYPERTENSION: ICD-10-CM

## 2019-11-12 DIAGNOSIS — H81.10 BENIGN PAROXYSMAL POSITIONAL VERTIGO, UNSPECIFIED LATERALITY: ICD-10-CM

## 2019-11-12 DIAGNOSIS — Z00.00 ENCOUNTER FOR PREVENTIVE HEALTH EXAMINATION: Primary | ICD-10-CM

## 2019-11-12 DIAGNOSIS — F41.1 GENERALIZED ANXIETY DISORDER: ICD-10-CM

## 2019-11-12 DIAGNOSIS — M81.0 OSTEOPOROSIS, POST-MENOPAUSAL: ICD-10-CM

## 2019-11-12 PROCEDURE — 99999 PR PBB SHADOW E&M-EST. PATIENT-LVL IV: CPT | Mod: PBBFAC,,, | Performed by: NURSE PRACTITIONER

## 2019-11-12 PROCEDURE — 3078F DIAST BP <80 MM HG: CPT | Mod: CPTII,S$GLB,, | Performed by: NURSE PRACTITIONER

## 2019-11-12 PROCEDURE — 3077F SYST BP >= 140 MM HG: CPT | Mod: CPTII,S$GLB,, | Performed by: NURSE PRACTITIONER

## 2019-11-12 PROCEDURE — 3077F PR MOST RECENT SYSTOLIC BLOOD PRESSURE >= 140 MM HG: ICD-10-PCS | Mod: CPTII,S$GLB,, | Performed by: NURSE PRACTITIONER

## 2019-11-12 PROCEDURE — 99999 PR PBB SHADOW E&M-EST. PATIENT-LVL IV: ICD-10-PCS | Mod: PBBFAC,,, | Performed by: NURSE PRACTITIONER

## 2019-11-12 PROCEDURE — G0439 PR MEDICARE ANNUAL WELLNESS SUBSEQUENT VISIT: ICD-10-PCS | Mod: S$GLB,,, | Performed by: NURSE PRACTITIONER

## 2019-11-12 PROCEDURE — G0439 PPPS, SUBSEQ VISIT: HCPCS | Mod: S$GLB,,, | Performed by: NURSE PRACTITIONER

## 2019-11-12 PROCEDURE — 3078F PR MOST RECENT DIASTOLIC BLOOD PRESSURE < 80 MM HG: ICD-10-PCS | Mod: CPTII,S$GLB,, | Performed by: NURSE PRACTITIONER

## 2019-11-12 RX ORDER — PAROXETINE HYDROCHLORIDE 20 MG/1
20 TABLET, FILM COATED ORAL EVERY MORNING
Qty: 90 TABLET | Refills: 1 | Status: SHIPPED | OUTPATIENT
Start: 2019-11-12 | End: 2019-11-13 | Stop reason: SDUPTHER

## 2019-11-12 NOTE — PATIENT INSTRUCTIONS
Counseling and Referral of Other Preventative  (Italic type indicates deductible and co-insurance are waived)    Patient Name: Courtney Plummer  Today's Date: 11/12/2019    Health Maintenance       Date Due Completion Date    Shingles Vaccine (2 of 3) 02/04/2010 12/10/2009    Influenza Vaccine (1) 09/01/2019 11/6/2018    DEXA SCAN 03/25/2021 3/25/2019    Override on 4/25/2014: Not Clinically Appropriate    Override on 1/4/2011: Done    Lipid Panel 09/06/2023 9/6/2018    TETANUS VACCINE 12/03/2025 12/3/2015 (Done)    Override on 12/3/2015: Done        No orders of the defined types were placed in this encounter.    The following information is provided to all patients.  This information is to help you find resources for any of the problems found today that may be affecting your health:                Living healthy guide: www.[facility].[place].[facility]      Understanding Diabetes: www.diabetes.org      Eating healthy: www.cdc.gov/healthyweight      [facility] home safety checklist: www.cdc.gov/steadi/patient.html      Agency on Aging: www.goea.[place].[facility]      Alcoholics anonymous (AA): www.aa.org      Physical Activity: www.kendrick.nih.gov/pv6gcjd      Tobacco use: www.quitwithusla.org     Step-by-Step  Checking Your Blood Pressure    Date Last Reviewed: 4/27/2016  © 2736-2180 [employer]. [address] [place] [address]. All rights reserved. This information is not intended as a substitute for professional medical care. Always follow your healthcare professional's instructions.        Discharge Instructions: Eating a Low-Salt Diet  Your health care provider has prescribed a low-salt diet for you. Most people with heart problems need to eat less salt, which is full of sodium. Too much sodium is linked to high blood pressure, which is linked to a greater risk of heart disease, stroke, blindness, and kidney problems.  Home care    Learn ways to cut back on salt (sodium):  · Eat less frozen, canned, dried,  packaged, and fast foods. These often contain high amounts of sodium.  · Season foods with herbs instead of salt when you cook.  · Season with flavorings such as pepper, lemon, garlic, and onion.  · Dont add salt to your food at the table.  · Sprinkle salt-free herbal blends on meats and vegetables.  Learn to read food labels carefully:  · Look for the total amount of sodium per serving.  · Look for foods labeled low sodium, reduced sodium, or no added salt.  · Beware. Salt goes by many names. Cut down on foods with these words (all forms of salt) listed as ingredients:  ¨ Salt  ¨ Sodium  ¨ Soy sauce  ¨ Baking soda  ¨ Baking powder  ¨ MSG  ¨ Monosodium  ¨ Na (the chemical symbol for sodium)  Other ideas:  · Use more fresh food. Buy more fruits and vegetables.  · Select lean meats, fish, and poultry.  · Find a cookbook with low-salt recipes. Youll find ideas for tasty meals that are healthy for your heart.  ·   When eating out, ask questions about the menu. Tell the  you're on a low-salt diet.  ¨ If you order fish, chicken, beef, or pork, ask the  to have it broiled, baked, poached, or grilled without salt, butter, or breading.  ¨ Choose plain steamed rice, boiled noodles, and baked or boiled potatoes. Top potatoes with chives and a little sour cream instead of butter.  · Avoid antacids that are high in salt. Check the label before you buy.  Follow-up  Make a follow-up appointment with a nutritionist as directed by our staff.  Date Last Reviewed: 6/20/2015  © 9621-2129 [employer]. [address] [place] [address], [place] [address]. All rights reserved. This information is not intended as a substitute for professional medical care. Always follow your healthcare professional's instructions.

## 2019-11-12 NOTE — TELEPHONE ENCOUNTER
I believe she wants to pay cash for brand name only Paxil  or generic is about 10 dollars for 90 with GoodRx coupon  Please call patient   I sent this prescription to [facility] [address]

## 2019-11-13 RX ORDER — PAROXETINE HYDROCHLORIDE 20 MG/1
20 TABLET, FILM COATED ORAL EVERY MORNING
Qty: 90 TABLET | Refills: 1 | Status: SHIPPED | OUTPATIENT
Start: 2019-11-13 | End: 2020-01-14 | Stop reason: SDUPTHER

## 2019-11-13 NOTE — TELEPHONE ENCOUNTER
----- Message from Adriel Li sent at 11/13/2019 11:07 AM CST -----  Contact: Pharmacy [phone]  Pharmacy is calling to clarify an RX.  RX name:  paroxetine (PAXIL) 20 MG tablet  What do they need to clarify:    Comments: pharmacy calling stating two Rx request for same name, but different dosage has been sent, would like to know if should fill both or not?    [facility] ([place]) - [address] [address] [place] [phone] (Phone)  [phone] (Fax    Please call an advise  Thank you

## 2019-11-14 NOTE — PROGRESS NOTES
"Courtney Plummer presented for a  Medicare AWV and comprehensive Health Risk Assessment today. The following components were reviewed and updated:    · Medical history  · Family History  · Social history  · Allergies and Current Medications  · Health Risk Assessment  · Health Maintenance  · Care Team     ** See Completed Assessments for Annual Wellness Visit within the encounter summary.**       The following assessments were completed:  · Living Situation  · CAGE  · Depression Screening  · Timed Get Up and Go  · Whisper Test  Cognitive Function Screening    ·   · Nutrition Screening  · ADL Screening  · PAQ Screening    Vitals:    11/12/19 1608   BP: (!) 150/70   Pulse: 74   SpO2: 95%   Weight: 60.1 kg (132 lb 6.2 oz)   Height: 5' 2" (1.575 m)     Body mass index is 24.21 kg/m².  Physical Exam   Constitutional: She is oriented to person, place, and time. She appears well-developed and well-nourished.   HENT:   Head: Normocephalic and atraumatic.   Nose: Nose normal.   Eyes: Conjunctivae and EOM are normal.   Cardiovascular: Normal rate, regular rhythm, normal heart sounds and intact distal pulses.   No murmur heard.  Pulmonary/Chest: Effort normal and breath sounds normal.   Musculoskeletal: Normal range of motion.   Neurological: She is alert and oriented to person, place, and time.   Skin: Skin is warm and dry.   Psychiatric: Her behavior is normal. Judgment and thought content normal. Her mood appears anxious.   Nursing note and vitals reviewed.        Diagnoses and health risks identified today and associated recommendations/orders:    1. Encounter for preventive health examination  Assessment performed. Health maintenance updated. Chart review completed.    2. Atherosclerosis of aorta  Noted on imaging. Chronic. Stable. Followed by PCP.    3. Generalized anxiety disorder  Chronic. Has been feeling more anxious lately. See telephone encounter to PCP.    4. Essential hypertension  Chronic. Not quite at goal today. " Anxious when discussing relationship with former spouse. Patient will start monitoring BPs at home. Followed by PCP.    5. Osteoporosis, post-menopausal  Chronic. Stable. Followed by PCP.    6. Benign paroxysmal positional vertigo, unspecified laterality  Chronic. Stable. Followed by PCP.      Provided Courtney with a 5-10 year written screening schedule and personal prevention plan. Recommendations were developed using the USPSTF age appropriate recommendations. Education, counseling, and referrals were provided as needed. After Visit Summary printed and given to patient which includes a list of additional screenings\tests needed.    Follow up for Annual Wellness Visit in 1 year, F/U with PCP as instructed, ;sooner if problems.    DIAMOND Echeverria

## 2019-12-30 DIAGNOSIS — M80.80XA PATHOLOGICAL FRACTURE DUE TO OSTEOPOROSIS, UNSPECIFIED FRACTURE SITE, UNSPECIFIED OSTEOPOROSIS TYPE, INITIAL ENCOUNTER: ICD-10-CM

## 2019-12-30 DIAGNOSIS — M81.0 OSTEOPOROSIS, UNSPECIFIED OSTEOPOROSIS TYPE, UNSPECIFIED PATHOLOGICAL FRACTURE PRESENCE: ICD-10-CM

## 2019-12-30 DIAGNOSIS — M81.6 LOCALIZED OSTEOPOROSIS OF LEQUESNE: Primary | ICD-10-CM

## 2019-12-30 RX ORDER — TERIPARATIDE 250 UG/ML
20 INJECTION, SOLUTION SUBCUTANEOUS DAILY
Qty: 2.4 ML | Refills: 11 | Status: SHIPPED | OUTPATIENT
Start: 2019-12-30 | End: 2020-06-08 | Stop reason: SDUPTHER

## 2019-12-31 ENCOUNTER — TELEPHONE (OUTPATIENT)
Dept: PHARMACY | Facility: CLINIC | Age: 84
End: 2019-12-31

## 2020-01-10 ENCOUNTER — TELEPHONE (OUTPATIENT)
Dept: PHARMACY | Facility: CLINIC | Age: 85
End: 2020-01-10

## 2020-01-10 NOTE — TELEPHONE ENCOUNTER
Initial Forteo consult declined by patient on 1/10/20. Patient is existing to therapy since  and has been filling though the  program. Forteo will be picked up by patient at [facility] on 20. New pen to be started on 20. $0 copay. First fill will be provided with Sharps, Pen Needles, Alcohol Swabs, and [facility] Welcome Packet. Confirmed 2 patient identifiers - name and . Therapy Appropriate for diagnosis of osteoporosis [M81.0].    Missed Doses: patient denies any missed doses due to lapse in PAP coverage  Patient reported side effects: dizziness - side effect tolerable; patient injects at bedtime. No intervention needed at this time.     All questions answered and addressed to patients satisfaction. [facility] will touchbase with patient 1 week from start, [facility] to contact patient in 3 weeks for refills; unless she re-enrolls in PAP.

## 2020-01-14 ENCOUNTER — OFFICE VISIT (OUTPATIENT)
Dept: INTERNAL MEDICINE | Facility: CLINIC | Age: 85
End: 2020-01-14
Payer: MEDICARE

## 2020-01-14 ENCOUNTER — LAB VISIT (OUTPATIENT)
Dept: LAB | Facility: HOSPITAL | Age: 85
End: 2020-01-14
Attending: INTERNAL MEDICINE
Payer: MEDICARE

## 2020-01-14 VITALS
OXYGEN SATURATION: 97 % | DIASTOLIC BLOOD PRESSURE: 62 MMHG | WEIGHT: 131.19 LBS | HEIGHT: 62 IN | BODY MASS INDEX: 24.14 KG/M2 | SYSTOLIC BLOOD PRESSURE: 140 MMHG | HEART RATE: 78 BPM

## 2020-01-14 DIAGNOSIS — E07.9 THYROID DISORDER: ICD-10-CM

## 2020-01-14 DIAGNOSIS — Z23 NEED FOR SHINGLES VACCINE: ICD-10-CM

## 2020-01-14 DIAGNOSIS — F41.1 GENERALIZED ANXIETY DISORDER: Primary | ICD-10-CM

## 2020-01-14 DIAGNOSIS — S22.080S COMPRESSION FRACTURE OF T12 VERTEBRA, SEQUELA: ICD-10-CM

## 2020-01-14 DIAGNOSIS — M81.0 OSTEOPOROSIS, POST-MENOPAUSAL: ICD-10-CM

## 2020-01-14 DIAGNOSIS — I70.0 ATHEROSCLEROSIS OF AORTA: ICD-10-CM

## 2020-01-14 LAB
ALBUMIN SERPL BCP-MCNC: 4.2 G/DL (ref 3.5–5.2)
ALP SERPL-CCNC: 76 U/L (ref 55–135)
ALT SERPL W/O P-5'-P-CCNC: 9 U/L (ref 10–44)
ANION GAP SERPL CALC-SCNC: 9 MMOL/L (ref 8–16)
AST SERPL-CCNC: 19 U/L (ref 10–40)
BASOPHILS # BLD AUTO: 0.04 K/UL (ref 0–0.2)
BASOPHILS NFR BLD: 0.6 % (ref 0–1.9)
BILIRUB SERPL-MCNC: 0.3 MG/DL (ref 0.1–1)
BUN SERPL-MCNC: 18 MG/DL (ref 8–23)
CALCIUM SERPL-MCNC: 9.7 MG/DL (ref 8.7–10.5)
CHLORIDE SERPL-SCNC: 97 MMOL/L (ref 95–110)
CO2 SERPL-SCNC: 30 MMOL/L (ref 23–29)
CREAT SERPL-MCNC: 0.9 MG/DL (ref 0.5–1.4)
DIFFERENTIAL METHOD: ABNORMAL
EOSINOPHIL # BLD AUTO: 0.1 K/UL (ref 0–0.5)
EOSINOPHIL NFR BLD: 1.5 % (ref 0–8)
ERYTHROCYTE [DISTWIDTH] IN BLOOD BY AUTOMATED COUNT: 17.3 % (ref 11.5–14.5)
EST. GFR  (AFRICAN AMERICAN): >60 ML/MIN/1.73 M^2
EST. GFR  (NON AFRICAN AMERICAN): 59 ML/MIN/1.73 M^2
GLUCOSE SERPL-MCNC: 98 MG/DL (ref 70–110)
HCT VFR BLD AUTO: 36.7 % (ref 37–48.5)
HGB BLD-MCNC: 12.3 G/DL (ref 12–16)
LYMPHOCYTES # BLD AUTO: 2.3 K/UL (ref 1–4.8)
LYMPHOCYTES NFR BLD: 34.4 % (ref 18–48)
MCH RBC QN AUTO: 28.9 PG (ref 27–31)
MCHC RBC AUTO-ENTMCNC: 33.5 G/DL (ref 32–36)
MCV RBC AUTO: 86 FL (ref 82–98)
MONOCYTES # BLD AUTO: 1.2 K/UL (ref 0.3–1)
MONOCYTES NFR BLD: 17.9 % (ref 4–15)
NEUTROPHILS # BLD AUTO: 3 K/UL (ref 1.8–7.7)
NEUTROPHILS NFR BLD: 45.6 % (ref 38–73)
PLATELET # BLD AUTO: 223 K/UL (ref 150–350)
PMV BLD AUTO: 10.4 FL (ref 9.2–12.9)
POTASSIUM SERPL-SCNC: 3.9 MMOL/L (ref 3.5–5.1)
PROT SERPL-MCNC: 7.2 G/DL (ref 6–8.4)
RBC # BLD AUTO: 4.26 M/UL (ref 4–5.4)
SODIUM SERPL-SCNC: 136 MMOL/L (ref 136–145)
WBC # BLD AUTO: 6.55 K/UL (ref 3.9–12.7)

## 2020-01-14 PROCEDURE — 99999 PR PBB SHADOW E&M-EST. PATIENT-LVL IV: CPT | Mod: PBBFAC,HCNC,, | Performed by: INTERNAL MEDICINE

## 2020-01-14 PROCEDURE — 1159F PR MEDICATION LIST DOCUMENTED IN MEDICAL RECORD: ICD-10-PCS | Mod: HCNC,S$GLB,, | Performed by: INTERNAL MEDICINE

## 2020-01-14 PROCEDURE — 3077F PR MOST RECENT SYSTOLIC BLOOD PRESSURE >= 140 MM HG: ICD-10-PCS | Mod: HCNC,CPTII,S$GLB, | Performed by: INTERNAL MEDICINE

## 2020-01-14 PROCEDURE — 1159F MED LIST DOCD IN RCRD: CPT | Mod: HCNC,S$GLB,, | Performed by: INTERNAL MEDICINE

## 2020-01-14 PROCEDURE — 80053 COMPREHEN METABOLIC PANEL: CPT | Mod: HCNC

## 2020-01-14 PROCEDURE — 99214 OFFICE O/P EST MOD 30 MIN: CPT | Mod: HCNC,S$GLB,, | Performed by: INTERNAL MEDICINE

## 2020-01-14 PROCEDURE — 1126F AMNT PAIN NOTED NONE PRSNT: CPT | Mod: HCNC,S$GLB,, | Performed by: INTERNAL MEDICINE

## 2020-01-14 PROCEDURE — 99999 PR PBB SHADOW E&M-EST. PATIENT-LVL IV: ICD-10-PCS | Mod: PBBFAC,HCNC,, | Performed by: INTERNAL MEDICINE

## 2020-01-14 PROCEDURE — 99214 PR OFFICE/OUTPT VISIT, EST, LEVL IV, 30-39 MIN: ICD-10-PCS | Mod: HCNC,S$GLB,, | Performed by: INTERNAL MEDICINE

## 2020-01-14 PROCEDURE — 1126F PR PAIN SEVERITY QUANTIFIED, NO PAIN PRESENT: ICD-10-PCS | Mod: HCNC,S$GLB,, | Performed by: INTERNAL MEDICINE

## 2020-01-14 PROCEDURE — 99499 RISK ADDL DX/OHS AUDIT: ICD-10-PCS | Mod: HCNC,S$GLB,, | Performed by: INTERNAL MEDICINE

## 2020-01-14 PROCEDURE — 3078F DIAST BP <80 MM HG: CPT | Mod: HCNC,CPTII,S$GLB, | Performed by: INTERNAL MEDICINE

## 2020-01-14 PROCEDURE — 99499 UNLISTED E&M SERVICE: CPT | Mod: HCNC,S$GLB,, | Performed by: INTERNAL MEDICINE

## 2020-01-14 PROCEDURE — 1101F PR PT FALLS ASSESS DOC 0-1 FALLS W/OUT INJ PAST YR: ICD-10-PCS | Mod: HCNC,CPTII,S$GLB, | Performed by: INTERNAL MEDICINE

## 2020-01-14 PROCEDURE — 3078F PR MOST RECENT DIASTOLIC BLOOD PRESSURE < 80 MM HG: ICD-10-PCS | Mod: HCNC,CPTII,S$GLB, | Performed by: INTERNAL MEDICINE

## 2020-01-14 PROCEDURE — 1101F PT FALLS ASSESS-DOCD LE1/YR: CPT | Mod: HCNC,CPTII,S$GLB, | Performed by: INTERNAL MEDICINE

## 2020-01-14 PROCEDURE — 84443 ASSAY THYROID STIM HORMONE: CPT | Mod: HCNC

## 2020-01-14 PROCEDURE — 85025 COMPLETE CBC W/AUTO DIFF WBC: CPT | Mod: HCNC

## 2020-01-14 PROCEDURE — 3077F SYST BP >= 140 MM HG: CPT | Mod: HCNC,CPTII,S$GLB, | Performed by: INTERNAL MEDICINE

## 2020-01-14 PROCEDURE — 36415 COLL VENOUS BLD VENIPUNCTURE: CPT | Mod: HCNC

## 2020-01-14 RX ORDER — PAROXETINE HYDROCHLORIDE 20 MG/1
20 TABLET, FILM COATED ORAL EVERY MORNING
Qty: 90 TABLET | Refills: 3 | Status: SHIPPED | OUTPATIENT
Start: 2020-01-14 | End: 2020-07-14 | Stop reason: ALTCHOICE

## 2020-01-14 RX ORDER — TRIAMTERENE AND HYDROCHLOROTHIAZIDE 37.5; 25 MG/1; MG/1
1 CAPSULE ORAL DAILY
Qty: 90 CAPSULE | Refills: 4 | Status: SHIPPED | OUTPATIENT
Start: 2020-01-14 | End: 2020-06-22 | Stop reason: SDUPTHER

## 2020-01-15 LAB — TSH SERPL DL<=0.005 MIU/L-ACNC: 0.9 UIU/ML (ref 0.4–4)

## 2020-01-15 NOTE — PROGRESS NOTES
Subjective:       Patient ID: Courtney Plummer is a 84 y.o. female.    Chief Complaint: Follow-up (6 month f/u )   Her anxiety is well controlled on Paxil    She agrees to shingles vaccine    She is on Forteo 1 shot daily    It is time to check blood work  HPI  Review of Systems   Constitutional: Negative for activity change and unexpected weight change.   HENT: Negative for hearing loss, rhinorrhea and trouble swallowing.    Eyes: Negative for discharge and visual disturbance.   Respiratory: Negative for chest tightness and wheezing.    Cardiovascular: Negative for chest pain and palpitations.   Gastrointestinal: Negative for blood in stool, constipation, diarrhea and vomiting.   Endocrine: Negative for polydipsia and polyuria.   Genitourinary: Negative for difficulty urinating, dysuria, hematuria and menstrual problem.   Musculoskeletal: Negative for arthralgias, joint swelling and neck pain.   Neurological: Negative for weakness and headaches.   Psychiatric/Behavioral: Negative for confusion and dysphoric mood.       Objective:      Physical Exam   Constitutional: She appears well-nourished.   HENT:   Head: Atraumatic.   Eyes: Conjunctivae are normal. No scleral icterus.   Neck: Neck supple.   Cardiovascular: Normal rate and regular rhythm.   Pulmonary/Chest: Effort normal and breath sounds normal.   Abdominal: Soft. There is no tenderness.   Musculoskeletal: She exhibits no edema.   Lymphadenopathy:     She has no cervical adenopathy.   Neurological: She is alert.   Skin: Skin is warm and dry.   Psychiatric: She has a normal mood and affect. Her behavior is normal.   Nursing note and vitals reviewed.      Assessment:       1. Generalized anxiety disorder    2. Need for shingles vaccine    3. Compression fracture of T12 vertebra, sequela    4. Atherosclerosis of aorta    5. Osteoporosis, post-menopausal    6. Thyroid disorder, nodule removed 1969.        Plan:   Courtney was seen today for  follow-up.    Diagnoses and all orders for this visit:    Generalized anxiety disorder  -     paroxetine (PAXIL) 20 MG tablet; Take 1 tablet (20 mg total) by mouth every morning.    Need for shingles vaccine    Compression fracture of T12 vertebra, sequela  -     Comprehensive metabolic panel; Future    Atherosclerosis of aorta    Osteoporosis, post-menopausal  -     CBC auto differential; Future  -     Comprehensive metabolic panel; Future    Thyroid disorder, nodule removed 1969.  -     CBC auto differential; Future  -     TSH; Future    Other orders, for essential hypertension continue triamterene hydrochlorothiazide.  -     triamterene-hydrochlorothiazide 37.5-25 mg (DYAZIDE) 37.5-25 mg per capsule; Take 1 capsule by mouth once daily. 1 Capsule Oral Every morning    Follow up in about 6 months (around 7/14/2020).

## 2020-01-20 ENCOUNTER — TELEPHONE (OUTPATIENT)
Dept: PHARMACY | Facility: CLINIC | Age: 85
End: 2020-01-20

## 2020-02-03 ENCOUNTER — OFFICE VISIT (OUTPATIENT)
Dept: INTERNAL MEDICINE | Facility: CLINIC | Age: 85
End: 2020-02-03
Payer: MEDICARE

## 2020-02-03 VITALS
HEART RATE: 97 BPM | OXYGEN SATURATION: 98 % | TEMPERATURE: 98 F | DIASTOLIC BLOOD PRESSURE: 70 MMHG | WEIGHT: 128.31 LBS | BODY MASS INDEX: 23.61 KG/M2 | HEIGHT: 62 IN | SYSTOLIC BLOOD PRESSURE: 158 MMHG

## 2020-02-03 DIAGNOSIS — E07.9 THYROID DISORDER: ICD-10-CM

## 2020-02-03 DIAGNOSIS — M81.0 OSTEOPOROSIS, POST-MENOPAUSAL: ICD-10-CM

## 2020-02-03 DIAGNOSIS — J20.9 ACUTE BRONCHITIS, UNSPECIFIED ORGANISM: Primary | ICD-10-CM

## 2020-02-03 DIAGNOSIS — I10 ESSENTIAL HYPERTENSION: ICD-10-CM

## 2020-02-03 DIAGNOSIS — E53.8 VITAMIN B 12 DEFICIENCY: ICD-10-CM

## 2020-02-03 DIAGNOSIS — I70.0 ATHEROSCLEROSIS OF AORTA: ICD-10-CM

## 2020-02-03 PROCEDURE — 99499 UNLISTED E&M SERVICE: CPT | Mod: HCNC,S$GLB,, | Performed by: NURSE PRACTITIONER

## 2020-02-03 PROCEDURE — 1159F PR MEDICATION LIST DOCUMENTED IN MEDICAL RECORD: ICD-10-PCS | Mod: HCNC,S$GLB,, | Performed by: NURSE PRACTITIONER

## 2020-02-03 PROCEDURE — 3077F SYST BP >= 140 MM HG: CPT | Mod: HCNC,CPTII,S$GLB, | Performed by: NURSE PRACTITIONER

## 2020-02-03 PROCEDURE — 1159F MED LIST DOCD IN RCRD: CPT | Mod: HCNC,S$GLB,, | Performed by: NURSE PRACTITIONER

## 2020-02-03 PROCEDURE — 1126F AMNT PAIN NOTED NONE PRSNT: CPT | Mod: HCNC,S$GLB,, | Performed by: NURSE PRACTITIONER

## 2020-02-03 PROCEDURE — 99499 RISK ADDL DX/OHS AUDIT: ICD-10-PCS | Mod: HCNC,S$GLB,, | Performed by: NURSE PRACTITIONER

## 2020-02-03 PROCEDURE — 99213 PR OFFICE/OUTPT VISIT, EST, LEVL III, 20-29 MIN: ICD-10-PCS | Mod: HCNC,S$GLB,, | Performed by: NURSE PRACTITIONER

## 2020-02-03 PROCEDURE — 99213 OFFICE O/P EST LOW 20 MIN: CPT | Mod: HCNC,S$GLB,, | Performed by: NURSE PRACTITIONER

## 2020-02-03 PROCEDURE — 1126F PR PAIN SEVERITY QUANTIFIED, NO PAIN PRESENT: ICD-10-PCS | Mod: HCNC,S$GLB,, | Performed by: NURSE PRACTITIONER

## 2020-02-03 PROCEDURE — 3078F PR MOST RECENT DIASTOLIC BLOOD PRESSURE < 80 MM HG: ICD-10-PCS | Mod: HCNC,CPTII,S$GLB, | Performed by: NURSE PRACTITIONER

## 2020-02-03 PROCEDURE — 3078F DIAST BP <80 MM HG: CPT | Mod: HCNC,CPTII,S$GLB, | Performed by: NURSE PRACTITIONER

## 2020-02-03 PROCEDURE — 99999 PR PBB SHADOW E&M-EST. PATIENT-LVL V: ICD-10-PCS | Mod: PBBFAC,HCNC,, | Performed by: NURSE PRACTITIONER

## 2020-02-03 PROCEDURE — 1101F PR PT FALLS ASSESS DOC 0-1 FALLS W/OUT INJ PAST YR: ICD-10-PCS | Mod: HCNC,CPTII,S$GLB, | Performed by: NURSE PRACTITIONER

## 2020-02-03 PROCEDURE — 1101F PT FALLS ASSESS-DOCD LE1/YR: CPT | Mod: HCNC,CPTII,S$GLB, | Performed by: NURSE PRACTITIONER

## 2020-02-03 PROCEDURE — 99999 PR PBB SHADOW E&M-EST. PATIENT-LVL V: CPT | Mod: PBBFAC,HCNC,, | Performed by: NURSE PRACTITIONER

## 2020-02-03 PROCEDURE — 3077F PR MOST RECENT SYSTOLIC BLOOD PRESSURE >= 140 MM HG: ICD-10-PCS | Mod: HCNC,CPTII,S$GLB, | Performed by: NURSE PRACTITIONER

## 2020-02-03 RX ORDER — BENZONATATE 100 MG/1
100 CAPSULE ORAL 3 TIMES DAILY PRN
Qty: 30 CAPSULE | Refills: 0 | Status: SHIPPED | OUTPATIENT
Start: 2020-02-03 | End: 2020-02-13

## 2020-02-03 RX ORDER — AMOXICILLIN AND CLAVULANATE POTASSIUM 500; 125 MG/1; MG/1
1 TABLET, FILM COATED ORAL 2 TIMES DAILY
Qty: 14 TABLET | Refills: 0 | Status: SHIPPED | OUTPATIENT
Start: 2020-02-03 | End: 2020-07-14 | Stop reason: ALTCHOICE

## 2020-02-03 RX ORDER — FLUTICASONE PROPIONATE 50 MCG
1 SPRAY, SUSPENSION (ML) NASAL DAILY
Qty: 16 G | Refills: 2 | Status: SHIPPED | OUTPATIENT
Start: 2020-02-03 | End: 2020-07-14

## 2020-02-03 NOTE — PATIENT INSTRUCTIONS
Meds as prescribed    Rest and Fluids, Tylenol or Motrin otc as needed for pain and or fever    Warm liquids to thin mucus    Allergen avoidance discussed, humidified air recommended    Warm salt water gargles as needed for throat pain    Nasal spray, taught how to correctly use    Follow up if symptoms worsen or fail to improve.

## 2020-02-03 NOTE — PROGRESS NOTES
Subjective:      Patient ID: Courtney Plummer is a 84 y.o. female.    Chief Complaint: Cough    Ms Valdez is an established patient of Dr Carlos.  I have seen her in the past for wellness visit.    She reports feeling fatigue and having cold symptoms for approximately one week. She was away at a bridge tournament last week. She states that started running fever after returning home on Thursday, Friday, and Saturday.  She feels depleted of energy. She feels that she is progressively getting worse.    She has a past medical history of Anxiety, Essential hypertension, Hypertension, and Kidney stones.      URI    This is a new problem. The current episode started 1 to 4 weeks ago. The problem has been gradually worsening. The maximum temperature recorded prior to her arrival was 100.4 - 100.9 F. The fever has been present for 3 to 4 days. Associated symptoms include congestion, coughing and a plugged ear sensation. Pertinent negatives include no abdominal pain, chest pain, diarrhea, dysuria, ear pain, headaches, joint pain, joint swelling, nausea, neck pain, rash, rhinorrhea, sinus pain, sneezing, sore throat, swollen glands, vomiting or wheezing. Associated symptoms comments: Decreased appetite. She has tried increased fluids (Mucinex) for the symptoms. The treatment provided no relief.       Review of Systems   Constitutional: Positive for activity change, appetite change, fatigue and fever. Negative for chills and diaphoresis.   HENT: Positive for congestion and postnasal drip. Negative for ear pain, rhinorrhea, sinus pain, sneezing, sore throat and trouble swallowing.    Eyes: Negative for pain, redness and itching.   Respiratory: Positive for cough. Negative for chest tightness, shortness of breath and wheezing.    Cardiovascular: Negative for chest pain, palpitations and leg swelling.   Gastrointestinal: Negative for abdominal pain, diarrhea, nausea and vomiting.   Genitourinary: Negative for dysuria.    Musculoskeletal: Negative for joint pain and neck pain.   Skin: Negative for rash.   Neurological: Negative for dizziness, light-headedness and headaches.       Review of patient's allergies indicates:   Allergen Reactions    Sulfa (sulfonamide antibiotics) Anaphylaxis    Macrobid [nitrofurantoin monohyd/m-cryst]      Fever, joint pain    Codeine Anxiety       Current Outpatient Medications   Medication Sig Dispense Refill    BIOTIN ORAL Take by mouth.      magnesium oxide (MAG-OX) 400 mg tablet       multivitamin (ONE DAILY MULTIVITAMIN) per tablet Take 1 tablet by mouth once daily. Takes 3x weekly      paroxetine (PAXIL) 20 MG tablet Take 1 tablet (20 mg total) by mouth every morning. 90 tablet 3    potassium gluconate 595 mg (99 mg) Tab       teriparatide (FORTEO) 20 mcg/dose - 600 mcg/2.4 mL PnIj Inject 0.08 mLs (20 mcg total) into the skin once daily. 2.4 mL 11    triamterene-hydrochlorothiazide 37.5-25 mg (DYAZIDE) 37.5-25 mg per capsule Take 1 capsule by mouth once daily. 1 Capsule Oral Every morning 90 capsule 4    VITAMIN D3 1,000 unit capsule       amoxicillin-clavulanate 500-125mg (AUGMENTIN) 500-125 mg Tab Take 1 tablet (500 mg total) by mouth 2 (two) times daily. 14 tablet 0    benzonatate (TESSALON) 100 MG capsule Take 1 capsule (100 mg total) by mouth 3 (three) times daily as needed for Cough. 30 capsule 0    fluticasone propionate (FLONASE) 50 mcg/actuation nasal spray 1 spray (50 mcg total) by Each Nostril route once daily. 16 g 2     No current facility-administered medications for this visit.        Patient Active Problem List    Diagnosis Date Noted    Compression fracture of T12 vertebra, s/p kyphoplasty  02/20/2019    Recurrent UTI 09/11/2018    Vitamin B 12 deficiency 09/05/2018    Idiopathic peripheral neuropathy, since 2014, back surgery 09/05/2018    Atherosclerosis of aorta 09/05/2018    Hyponatremia 01/08/2018    Essential hypertension 01/08/2018    Syncope  2018    Encounter for screening mammogram for malignant neoplasm of breast 2017    Combined form of senile cataract 2017    Postmenopausal status 2017    Osteoporosis, post-menopausal 2015    History of back surgery, 2014 Dr. 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 2014    Asymptomatic gallstones 2013    History of vertebral compression fracture 2013    Chronic low back pain 2013    Lumbar spinal stenosis 2013    Panic attacks 2013    Brittle nails 2013    BRIAN generalized anxiety disorder 2013    Benign positional vertigo 2013    Carotid bruit 2013    Thyroid disorder, nodule removed 1969. 2013       Past Medical History:   Diagnosis Date    Anxiety     Essential hypertension 2018    Hypertension     Kidney stones 12/3/2015       Past Surgical History:   Procedure Laterality Date    BACK SURGERY      x3     SECTION, CLASSIC       last     FRACTURE SURGERY      compression fracture    SPINE SURGERY      THYROID SURGERY         Family History   Problem Relation Age of Onset    Heart disease Father     Heart disease Brother     Cancer Brother         melanoma    No Known Problems Daughter     No Known Problems Son     No Known Problems Daughter     No Known Problems Daughter     No Known Problems Son     No Known Problems Son     Breast cancer Neg Hx     Colon cancer Neg Hx     Ovarian cancer Neg Hx          Objective:     Lab Results   Component Value Date    WBC 6.55 2020    HGB 12.3 2020    HCT 36.7 (L) 2020     2020    CHOL 208 (H) 2018    TRIG 68 2018    HDL 46 2018    ALT 9 (L) 2020    AST 19 2020     2020    K 3.9 2020    CL 97 2020    CREATININE 0.9  "01/14/2020    BUN 18 01/14/2020    CO2 30 (H) 01/14/2020    TSH 0.904 01/14/2020    INR 0.9 10/07/2016       Vitals:    02/03/20 1140   BP: (!) 158/70   Pulse: 97   Temp: 98.1 °F (36.7 °C)   TempSrc: Oral   SpO2: 98%   Weight: 58.2 kg (128 lb 4.9 oz)   Height: 5' 2" (1.575 m)   PainSc: 0-No pain       Body mass index is 23.47 kg/m².    Physical Exam   Constitutional: She is oriented to person, place, and time. She appears well-developed and well-nourished.   HENT:   Head: Normocephalic and atraumatic.   Right Ear: External ear and ear canal normal. No drainage. Tympanic membrane is not injected. No middle ear effusion. Decreased hearing is noted.   Left Ear: External ear and ear canal normal. No drainage. Tympanic membrane is not injected.  No middle ear effusion. Decreased hearing is noted.   Nose: Mucosal edema and rhinorrhea present. Right sinus exhibits no maxillary sinus tenderness and no frontal sinus tenderness. Left sinus exhibits no maxillary sinus tenderness and no frontal sinus tenderness.   Mouth/Throat: Uvula is midline. Posterior oropharyngeal erythema present. No oropharyngeal exudate or posterior oropharyngeal edema.   Eyes: Conjunctivae and EOM are normal.   Neck: Normal range of motion. Neck supple. No thyromegaly present.   Cardiovascular: Normal rate, regular rhythm, normal heart sounds and intact distal pulses.   No murmur heard.  Pulmonary/Chest: Effort normal and breath sounds normal. No respiratory distress. She has no wheezes. She has no rales.   Musculoskeletal: Normal range of motion.   Neurological: She is alert and oriented to person, place, and time.   Skin: Skin is warm and dry.   Psychiatric: She has a normal mood and affect. Her behavior is normal. Judgment and thought content normal.   Nursing note and vitals reviewed.    Assessment:     1. Acute bronchitis, unspecified organism    2. Osteoporosis, post-menopausal    3. Atherosclerosis of aorta    4. Essential hypertension    5. " Thyroid disorder, nodule removed 1969.    6. Vitamin B 12 deficiency      Plan:     Courtney was seen today for cough.    Diagnoses and all orders for this visit:    Acute bronchitis, unspecified organism  -     amoxicillin-clavulanate 500-125mg (AUGMENTIN) 500-125 mg Tab; Take 1 tablet (500 mg total) by mouth 2 (two) times daily.  -     fluticasone propionate (FLONASE) 50 mcg/actuation nasal spray; 1 spray (50 mcg total) by Each Nostril route once daily.  -     benzonatate (TESSALON) 100 MG capsule; Take 1 capsule (100 mg total) by mouth 3 (three) times daily as needed for Cough.    Osteoporosis, post-menopausal  Chronic. Stable. Followed by PCP.    Atherosclerosis of aorta  Noted on imaging. Chronic. Stable. Followed by PCP.    Essential hypertension  Chronic. Stable. Followed by PCP.  Not at goal. Coughing during visit, Taking Mucinex.  PCP follow up scheduled.    Thyroid disorder, nodule removed 1969.  Chronic. Stable. Followed by PCP.    Vitamin B 12 deficiency  Chronic. Stable. Followed by PCP.      Health Maintenance   Topic Date Due    DEXA SCAN  03/25/2021    Lipid Panel  09/06/2023    TETANUS VACCINE  12/03/2025    Pneumococcal Vaccine (65+ Low/Medium Risk)  Completed       Patient Instructions   Meds as prescribed    Rest and Fluids, Tylenol or Motrin otc as needed for pain and or fever    Warm liquids to thin mucus    Allergen avoidance discussed, humidified air recommended    Warm salt water gargles as needed for throat pain    Nasal spray, taught how to correctly use    Follow up if symptoms worsen or fail to improve.

## 2020-02-04 ENCOUNTER — TELEPHONE (OUTPATIENT)
Dept: INTERNAL MEDICINE | Facility: CLINIC | Age: 85
End: 2020-02-04

## 2020-02-04 RX ORDER — HYDROCODONE BITARTRATE AND ACETAMINOPHEN 5; 325 MG/1; MG/1
TABLET ORAL
Qty: 10 TABLET | Refills: 0 | Status: SHIPPED | OUTPATIENT
Start: 2020-02-04 | End: 2020-07-14

## 2020-02-04 NOTE — TELEPHONE ENCOUNTER
Patient stated that she was seen at the [place] location on 2-3-2020.  Would like for pcp to review notes from urgent care appointment, patient stated she is still coughing

## 2020-02-04 NOTE — TELEPHONE ENCOUNTER
----- Message from Josi Upton sent at 2/4/2020  8:23 AM CST -----  Contact: [facility] request  Message     Appointment Request From: Courtney Plummer    With Provider: Courtney Yang MD [[facility]]    Preferred Date Range: Any    Preferred Times: Any time    Reason for visit: Existing Patient    Comments:  Fever, cough , congestion.  Had flu shot.  Urgent Care made me worse last time I went. Please ask Rylee to see me. I'm 84,  Please!

## 2020-02-05 NOTE — TELEPHONE ENCOUNTER
Dear Iraida,  It looks like they did a great job with her at the urgent care center.  She is allergic to codeine unfortunately, codeine is a best cough suppressant.  I sent some hydrocodone acetaminophen 5-325 mg tablets to the [facility] that she uses.  1/2 or 1 tablet should suppress her cough which seems to be her most worrisome symptom.  Sincerely, Dr. Courtney Carlos

## 2020-02-15 NOTE — TELEPHONE ENCOUNTER
FOR DOCUMENTATION ONLY:  Financial Assistance for Forteo approved from 1/1/20 to 12/31/20  Source: [facility] Patient Assistance  Phone: [phone]  Fax: [phone]

## 2020-02-15 NOTE — TELEPHONE ENCOUNTER
Patient notified of [facility] assistance approval. She will call to schedule deliveries. Made patient aware to call [facility] or MD if renewal assistance is needed. Patient verbalized understanding.

## 2020-02-20 ENCOUNTER — TELEPHONE (OUTPATIENT)
Dept: ORTHOPEDICS | Facility: CLINIC | Age: 85
End: 2020-02-20

## 2020-02-20 NOTE — TELEPHONE ENCOUNTER
Attempt to contact patient to find out which arm /elbow was giving patient pain. Left message for patient to return call to [phone]. Thanks.

## 2020-02-21 ENCOUNTER — HOSPITAL ENCOUNTER (OUTPATIENT)
Dept: RADIOLOGY | Facility: HOSPITAL | Age: 85
Discharge: HOME | End: 2020-02-21
Attending: PHYSICIAN ASSISTANT
Payer: MEDICARE

## 2020-02-21 ENCOUNTER — OFFICE VISIT (OUTPATIENT)
Dept: ORTHOPEDICS | Facility: CLINIC | Age: 85
End: 2020-02-21
Payer: MEDICARE

## 2020-02-21 VITALS — BODY MASS INDEX: 23.55 KG/M2 | WEIGHT: 128 LBS | HEIGHT: 62 IN

## 2020-02-21 DIAGNOSIS — M79.601 RIGHT ARM PAIN: Primary | ICD-10-CM

## 2020-02-21 DIAGNOSIS — M79.601 RIGHT ARM PAIN: ICD-10-CM

## 2020-02-21 PROCEDURE — 1159F PR MEDICATION LIST DOCUMENTED IN MEDICAL RECORD: ICD-10-PCS | Mod: HCNC,S$GLB,, | Performed by: PHYSICIAN ASSISTANT

## 2020-02-21 PROCEDURE — 99999 PR PBB SHADOW E&M-EST. PATIENT-LVL III: CPT | Mod: PBBFAC,HCNC,, | Performed by: PHYSICIAN ASSISTANT

## 2020-02-21 PROCEDURE — 73060 XR HUMERUS 2 VIEW RIGHT: ICD-10-PCS | Mod: 26,HCNC,RT, | Performed by: RADIOLOGY

## 2020-02-21 PROCEDURE — 99213 OFFICE O/P EST LOW 20 MIN: CPT | Mod: HCNC,S$GLB,, | Performed by: PHYSICIAN ASSISTANT

## 2020-02-21 PROCEDURE — 73060 X-RAY EXAM OF HUMERUS: CPT | Mod: TC,HCNC,RT

## 2020-02-21 PROCEDURE — 1125F AMNT PAIN NOTED PAIN PRSNT: CPT | Mod: HCNC,S$GLB,, | Performed by: PHYSICIAN ASSISTANT

## 2020-02-21 PROCEDURE — 99213 PR OFFICE/OUTPT VISIT, EST, LEVL III, 20-29 MIN: ICD-10-PCS | Mod: HCNC,S$GLB,, | Performed by: PHYSICIAN ASSISTANT

## 2020-02-21 PROCEDURE — 73060 X-RAY EXAM OF HUMERUS: CPT | Mod: 26,HCNC,RT, | Performed by: RADIOLOGY

## 2020-02-21 PROCEDURE — 1125F PR PAIN SEVERITY QUANTIFIED, PAIN PRESENT: ICD-10-PCS | Mod: HCNC,S$GLB,, | Performed by: PHYSICIAN ASSISTANT

## 2020-02-21 PROCEDURE — 1159F MED LIST DOCD IN RCRD: CPT | Mod: HCNC,S$GLB,, | Performed by: PHYSICIAN ASSISTANT

## 2020-02-21 PROCEDURE — 1101F PT FALLS ASSESS-DOCD LE1/YR: CPT | Mod: HCNC,CPTII,S$GLB, | Performed by: PHYSICIAN ASSISTANT

## 2020-02-21 PROCEDURE — 73030 X-RAY EXAM OF SHOULDER: CPT | Mod: 26,HCNC,RT, | Performed by: RADIOLOGY

## 2020-02-21 PROCEDURE — 1101F PR PT FALLS ASSESS DOC 0-1 FALLS W/OUT INJ PAST YR: ICD-10-PCS | Mod: HCNC,CPTII,S$GLB, | Performed by: PHYSICIAN ASSISTANT

## 2020-02-21 PROCEDURE — 73030 XR SHOULDER TRAUMA 3 VIEW RIGHT: ICD-10-PCS | Mod: 26,HCNC,RT, | Performed by: RADIOLOGY

## 2020-02-21 PROCEDURE — 73030 X-RAY EXAM OF SHOULDER: CPT | Mod: TC,HCNC,RT

## 2020-02-21 PROCEDURE — 99999 PR PBB SHADOW E&M-EST. PATIENT-LVL III: ICD-10-PCS | Mod: PBBFAC,HCNC,, | Performed by: PHYSICIAN ASSISTANT

## 2020-02-21 NOTE — PROGRESS NOTES
SUBJECTIVE:     Chief Complaint & History of Present Illness:  Courtney Plummer is a 84 y.o. year old female who presents today with constant right shoulder pain that started one month ago.  She is Right hand dominant.  The pain began after she has blood work drawn.  She states the tourniquet was tight and left a bruise.  She has had pain in the same area of her deltoid where the tourniquet was located.  The pain is described as achy, 5/10.  It is aggravated by activity.  She denies numbness, tingling or weakness.  Previous treatments include rest which have provided minimal relief.  There is not a history of previous injury or surgery to the shoulder.      Review of patient's allergies indicates:   Allergen Reactions    Sulfa (sulfonamide antibiotics) Anaphylaxis    Macrobid [nitrofurantoin monohyd/m-cryst]      Fever, joint pain    Codeine Anxiety         Current Outpatient Medications   Medication Sig Dispense Refill    amoxicillin-clavulanate 500-125mg (AUGMENTIN) 500-125 mg Tab Take 1 tablet (500 mg total) by mouth 2 (two) times daily. 14 tablet 0    BIOTIN ORAL Take by mouth.      fluticasone propionate (FLONASE) 50 mcg/actuation nasal spray 1 spray (50 mcg total) by Each Nostril route once daily. 16 g 2    HYDROcodone-acetaminophen (NORCO) 5-325 mg per tablet Take half to 1 tablet every 6 hr as needed to suppress cough 10 tablet 0    magnesium oxide (MAG-OX) 400 mg tablet       multivitamin (ONE DAILY MULTIVITAMIN) per tablet Take 1 tablet by mouth once daily. Takes 3x weekly      paroxetine (PAXIL) 20 MG tablet Take 1 tablet (20 mg total) by mouth every morning. 90 tablet 3    potassium gluconate 595 mg (99 mg) Tab       teriparatide (FORTEO) 20 mcg/dose - 600 mcg/2.4 mL PnIj Inject 0.08 mLs (20 mcg total) into the skin once daily. 2.4 mL 11    triamterene-hydrochlorothiazide 37.5-25 mg (DYAZIDE) 37.5-25 mg per capsule Take 1 capsule by mouth once daily. 1 Capsule Oral Every morning 90  capsule 4    VITAMIN D3 1,000 unit capsule        No current facility-administered medications for this visit.        Past Medical History:   Diagnosis Date    Anxiety     Essential hypertension 2018    Hypertension     Kidney stones 12/3/2015       Past Surgical History:   Procedure Laterality Date    BACK SURGERY      x3     SECTION, CLASSIC       last     FRACTURE SURGERY      compression fracture    SPINE SURGERY      THYROID SURGERY         Vital Signs (Most Recent)  There were no vitals filed for this visit.    Review of Systems:  ROS:  Constitutional: no fever or chills  Eyes: no visual changes  ENT: no nasal congestion or sore throat  Respiratory: no cough or shortness of breath  Cardiovascular: no chest pain or palpitations  Gastrointestinal: no nausea or vomiting, tolerating diet  Genitourinary: no hematuria or dysuria  Integument/Breast: no rash or pruritis  Hematologic/Lymphatic: no easy bruising or lymphadenopathy  Musculoskeletal: no arthralgias or myalgias  Neurological: no seizures or tremors  Behavioral/Psych: no auditory or visual hallucinations  Endocrine: no heat or cold intolerance      OBJECTIVE:     PHYSICAL EXAM:  There were no vitals filed for this visit.        General: Weight: 58.1 kg (128 lb) Body mass index is 23.41 kg/m².  Patient is alert, awake and oriented to time, place and person. Mood and affect are appropriate.  Patient does not appear to be in any distress, denies any constitutional symptoms and appears stated age.   HEENT: Pupils are equal and round, sclera are not injected. External examination of ears and nose reveals no abnormalities. Cranial nerves II-X are grossly intact  Neck examination demonstrates painless active range of motion. Spurling's sign is negative  Skin: no rashes, abrasions or open wounds on the affected extremity   Resp: No respiratory distress or audible wheezing   Psych:  normal mood and behavior  CV: 2+ pulses, all  extremities warm and well perfused   Right Shoulder   No swelling or atrophy  Mild resolving ecchymosis along lateral deltoid  TTP along deltoid  Range of motion is painful, worse with ER and abduction  ROM: external rotation 50/50, forward flexion 160 / 160, internal rotation to L1  Shoulder Strength: biceps 5/5, triceps 5/5, abduction 5/5, adduction 5/5  positive for impingement sign, sensory exam normal and motor exam normal  Stability tests: normal  Special Tests:    Crossbody test: negative    Neer's positive  Hawkin's positive  Drop arm negative  Belly press negativ    IMAGING:  X-rays of the right shoulder and humerus, personally reviewed by me, demonstrate mild degenerative changes.  No fracture or dislocation.    ASSESSMENT/PLAN:   84 y.o. year old female with right arm pain    - We discussed that this is likely a soft tissue injury/contustion from the tourniquet.  I recommend she rest, ice, try otc nsaids, topical creams.  - Follow up in 4-6 weeks if no improvement.  Consider further imaging if no improvement in symptoms.

## 2020-03-09 ENCOUNTER — OFFICE VISIT (OUTPATIENT)
Dept: ORTHOPEDICS | Facility: CLINIC | Age: 85
End: 2020-03-09
Payer: MEDICARE

## 2020-03-09 VITALS — BODY MASS INDEX: 23.61 KG/M2 | HEIGHT: 62 IN | WEIGHT: 128.31 LBS

## 2020-03-09 DIAGNOSIS — M79.601 RIGHT ARM PAIN: Primary | ICD-10-CM

## 2020-03-09 PROCEDURE — 99213 PR OFFICE/OUTPT VISIT, EST, LEVL III, 20-29 MIN: ICD-10-PCS | Mod: HCNC,S$GLB,, | Performed by: PHYSICIAN ASSISTANT

## 2020-03-09 PROCEDURE — 1126F AMNT PAIN NOTED NONE PRSNT: CPT | Mod: HCNC,S$GLB,, | Performed by: PHYSICIAN ASSISTANT

## 2020-03-09 PROCEDURE — 1159F PR MEDICATION LIST DOCUMENTED IN MEDICAL RECORD: ICD-10-PCS | Mod: HCNC,S$GLB,, | Performed by: PHYSICIAN ASSISTANT

## 2020-03-09 PROCEDURE — 1126F PR PAIN SEVERITY QUANTIFIED, NO PAIN PRESENT: ICD-10-PCS | Mod: HCNC,S$GLB,, | Performed by: PHYSICIAN ASSISTANT

## 2020-03-09 PROCEDURE — 1101F PR PT FALLS ASSESS DOC 0-1 FALLS W/OUT INJ PAST YR: ICD-10-PCS | Mod: HCNC,CPTII,S$GLB, | Performed by: PHYSICIAN ASSISTANT

## 2020-03-09 PROCEDURE — 1159F MED LIST DOCD IN RCRD: CPT | Mod: HCNC,S$GLB,, | Performed by: PHYSICIAN ASSISTANT

## 2020-03-09 PROCEDURE — 99999 PR PBB SHADOW E&M-EST. PATIENT-LVL III: CPT | Mod: PBBFAC,HCNC,, | Performed by: PHYSICIAN ASSISTANT

## 2020-03-09 PROCEDURE — 99999 PR PBB SHADOW E&M-EST. PATIENT-LVL III: ICD-10-PCS | Mod: PBBFAC,HCNC,, | Performed by: PHYSICIAN ASSISTANT

## 2020-03-09 PROCEDURE — 1101F PT FALLS ASSESS-DOCD LE1/YR: CPT | Mod: HCNC,CPTII,S$GLB, | Performed by: PHYSICIAN ASSISTANT

## 2020-03-09 PROCEDURE — 99213 OFFICE O/P EST LOW 20 MIN: CPT | Mod: HCNC,S$GLB,, | Performed by: PHYSICIAN ASSISTANT

## 2020-03-09 NOTE — PROGRESS NOTES
"Patient ID: Courtney Plummer is a 84 y.o. female.    Chief Complaint: Pain of the Right Upper Arm      HISTORY:  Courtney Plummer is a 84 y.o. female who returns to me today for follow up of right arm pain. She was last seen by me 2/21/2020. She continues to have significant pain along the deltoid.  Pain is worse with any activity.  She has tried topical creams.  She does not like taking NSAIDS.  It has been two months since this began.  The original injury was from a tight tourniquet on her arm after giving blood.  She denies pain radiating down her arm.  No numbness or tingling.        PMH/PSH/FamHx/SocHx:    Unchanged from prior visit.    ROS:  Constitution: Negative for chills, fever and weakness.   Cardiovascular: Negative for chest pain.   Respiratory: Negative for cough and shortness of breath.   Hematologic/Lymphatic: Negative for bleeding problem. Does not bruise/bleed easily.   Skin: Negative for color change, itching and poor wound healing.   Musculoskeletal: Positive for right arm pain  Gastrointestinal: Negative for heartburn.   Neurological: Negative for dizziness, focal weakness, numbness, paresthesias and sensory change.   Psychiatric/Behavioral: The patient is not nervous/anxious.   Allergic/Immunologic: Negative for environmental allergies and persistent infections.     PHYSICAL EXAM:   Ht 5' 2" (1.575 m)   Wt 58.2 kg (128 lb 4.9 oz)   BMI 23.47 kg/m²   Right arm  Skin intact, no erythema or swelling  There is contusion along distal deltoid with TTP  FROM shoulder  Pain with shoulder ROM  5/5 biceps, 5/5 triceps  Sensation to light touch intact  2+ RP    IMAGING: No new imaging today    ASSESSMENT/PLAN:    Courtney was seen today for pain.    Diagnoses and all orders for this visit:    Right arm pain  -     US Upper Extremity Veins Right; Future      - Will order U/S RUE to rule out DVT. Will call with results.  Recommend continue ice and rest.  Consider PT if no improvement.     "

## 2020-03-13 ENCOUNTER — HOSPITAL ENCOUNTER (OUTPATIENT)
Dept: RADIOLOGY | Facility: HOSPITAL | Age: 85
Discharge: HOME | End: 2020-03-13
Attending: PHYSICIAN ASSISTANT
Payer: MEDICARE

## 2020-03-13 DIAGNOSIS — M79.601 RIGHT ARM PAIN: ICD-10-CM

## 2020-03-13 PROCEDURE — 93971 US UPPER EXTREMITY VEINS RIGHT: ICD-10-PCS | Mod: 26,HCNC,RT, | Performed by: RADIOLOGY

## 2020-03-13 PROCEDURE — 93971 EXTREMITY STUDY: CPT | Mod: TC,HCNC,RT

## 2020-03-13 PROCEDURE — 93971 EXTREMITY STUDY: CPT | Mod: 26,HCNC,RT, | Performed by: RADIOLOGY

## 2020-03-19 ENCOUNTER — PATIENT MESSAGE (OUTPATIENT)
Dept: INTERNAL MEDICINE | Facility: CLINIC | Age: 85
End: 2020-03-19

## 2020-04-15 ENCOUNTER — OFFICE VISIT (OUTPATIENT)
Dept: INTERNAL MEDICINE | Facility: CLINIC | Age: 85
End: 2020-04-15
Payer: MEDICARE

## 2020-04-15 VITALS
WEIGHT: 127.44 LBS | HEART RATE: 90 BPM | DIASTOLIC BLOOD PRESSURE: 66 MMHG | HEIGHT: 62 IN | TEMPERATURE: 98 F | BODY MASS INDEX: 23.45 KG/M2 | OXYGEN SATURATION: 98 % | SYSTOLIC BLOOD PRESSURE: 130 MMHG

## 2020-04-15 DIAGNOSIS — E53.8 VITAMIN B 12 DEFICIENCY: ICD-10-CM

## 2020-04-15 DIAGNOSIS — M79.601 RIGHT ARM PAIN: Primary | ICD-10-CM

## 2020-04-15 DIAGNOSIS — S22.080S COMPRESSION FRACTURE OF T12 VERTEBRA, SEQUELA: ICD-10-CM

## 2020-04-15 DIAGNOSIS — Z79.899 HIGH RISK MEDICATION USE: ICD-10-CM

## 2020-04-15 DIAGNOSIS — I10 ESSENTIAL HYPERTENSION: ICD-10-CM

## 2020-04-15 DIAGNOSIS — F41.1 GENERALIZED ANXIETY DISORDER: ICD-10-CM

## 2020-04-15 DIAGNOSIS — Z98.890 HISTORY OF BACK SURGERY: ICD-10-CM

## 2020-04-15 DIAGNOSIS — E07.9 THYROID DISORDER: ICD-10-CM

## 2020-04-15 DIAGNOSIS — M81.0 OSTEOPOROSIS, POST-MENOPAUSAL: ICD-10-CM

## 2020-04-15 DIAGNOSIS — R09.A2 SENSATION OF LUMP IN THROAT: ICD-10-CM

## 2020-04-15 PROCEDURE — 1101F PT FALLS ASSESS-DOCD LE1/YR: CPT | Mod: HCNC,CPTII,S$GLB, | Performed by: INTERNAL MEDICINE

## 2020-04-15 PROCEDURE — 3078F DIAST BP <80 MM HG: CPT | Mod: HCNC,CPTII,S$GLB, | Performed by: INTERNAL MEDICINE

## 2020-04-15 PROCEDURE — 3075F SYST BP GE 130 - 139MM HG: CPT | Mod: HCNC,CPTII,S$GLB, | Performed by: INTERNAL MEDICINE

## 2020-04-15 PROCEDURE — 3075F PR MOST RECENT SYSTOLIC BLOOD PRESS GE 130-139MM HG: ICD-10-PCS | Mod: HCNC,CPTII,S$GLB, | Performed by: INTERNAL MEDICINE

## 2020-04-15 PROCEDURE — 99215 OFFICE O/P EST HI 40 MIN: CPT | Mod: HCNC,S$GLB,, | Performed by: INTERNAL MEDICINE

## 2020-04-15 PROCEDURE — 3078F PR MOST RECENT DIASTOLIC BLOOD PRESSURE < 80 MM HG: ICD-10-PCS | Mod: HCNC,CPTII,S$GLB, | Performed by: INTERNAL MEDICINE

## 2020-04-15 PROCEDURE — 99999 PR PBB SHADOW E&M-EST. PATIENT-LVL V: CPT | Mod: PBBFAC,HCNC,, | Performed by: INTERNAL MEDICINE

## 2020-04-15 PROCEDURE — 1125F PR PAIN SEVERITY QUANTIFIED, PAIN PRESENT: ICD-10-PCS | Mod: HCNC,S$GLB,, | Performed by: INTERNAL MEDICINE

## 2020-04-15 PROCEDURE — 1125F AMNT PAIN NOTED PAIN PRSNT: CPT | Mod: HCNC,S$GLB,, | Performed by: INTERNAL MEDICINE

## 2020-04-15 PROCEDURE — 99215 PR OFFICE/OUTPT VISIT, EST, LEVL V, 40-54 MIN: ICD-10-PCS | Mod: HCNC,S$GLB,, | Performed by: INTERNAL MEDICINE

## 2020-04-15 PROCEDURE — 1159F PR MEDICATION LIST DOCUMENTED IN MEDICAL RECORD: ICD-10-PCS | Mod: HCNC,S$GLB,, | Performed by: INTERNAL MEDICINE

## 2020-04-15 PROCEDURE — 99999 PR PBB SHADOW E&M-EST. PATIENT-LVL V: ICD-10-PCS | Mod: PBBFAC,HCNC,, | Performed by: INTERNAL MEDICINE

## 2020-04-15 PROCEDURE — 1159F MED LIST DOCD IN RCRD: CPT | Mod: HCNC,S$GLB,, | Performed by: INTERNAL MEDICINE

## 2020-04-15 PROCEDURE — 1101F PR PT FALLS ASSESS DOC 0-1 FALLS W/OUT INJ PAST YR: ICD-10-PCS | Mod: HCNC,CPTII,S$GLB, | Performed by: INTERNAL MEDICINE

## 2020-04-15 NOTE — PATIENT INSTRUCTIONS
Exercises for Shoulder Flexibility: Wall Walk    Improving your flexibility can reduce pain. Stretching exercises also can help increase your range of pain-free motion. Breathe normally when you exercise. Use smooth, fluid movements.  Note: Follow any special instructions you are given. If you feel pain, stop the exercise. If the pain continues after stopping, call your healthcare provider:  · Stand with your shoulder about 2 feet from the wall.  · Raise your arm to shoulder level and gently walk your fingers up the wall as high as you can.  · Hold for a few seconds. Then walk your fingers back down.  · Repeat 3 times. Move closer to the wall as you repeat.  · Build up to holding each stretch for 30 seconds.  Caution: Do this stretch only if your healthcare provider recommends it. Dont do it when you are first injured.       Date Last Reviewed: 8/16/2015  © 1961-1640 [employer]. [address] [place] [address]. All rights reserved. This information is not intended as a substitute for professional medical care. Always follow your healthcare professional's instructions.        Pendulum (Flexibility)    1. Lean over next to a table, with your left arm supporting your weight on the table.  2. Relax your right arm and let it hang straight down.  3. Slowly begin to swing your right arm in a small [place]. Gradually make the [place] bigger if you can. Change direction after 1 minute of motion.  4. Next, swing your right arm backward and forward. Then move it side to side. Change direction after 1 minute of motion.  5. Repeat these movements for about 5 minutes.  6. Do this exercise 3 times a day, or as often as instructed.  Date Last Reviewed: 3/10/2016  © 1263-1561 [employer]. [address] [place] [address]. All rights reserved. This information is not intended as a substitute for professional medical care. Always follow your healthcare professional's  instructions.        Pendulum Exercise for Use with Shoulder Repair Surgeries  Stretching exercises for your shoulder, such as the pendulum exercise, can improve flexibility, increase range of motion, and reduce pain. Your healthcare provider or physical therapist has recommended the pendulum exercise to help speed your recovery. Remember to breathe normally when you exercise and try to use smooth, fluid movements.    Doing the pendulum exercise  · Follow any special instructions you were given. If you feel pain, stop the exercise. If the pain continues after stopping, call your healthcare provider or physical therapist.  · Start pendulum exercises with your affected arm as soon as directed by your healthcare provider:  ¨ Lean over with your good arm supported on a table or chair.  ¨ Relax the arm on the painful side, letting it hang straight down.  ¨ Slowly begin to swing the relaxed arm by moving your body. Move it in a [place], then reverse the direction. Next, move the arm backward and forward. Finally, move it side to side.  ¨ Let gravity gently sway your arm. Do not actively lift or move it with your shoulder muscles.  ¨ Do the exercise 3 times a day, for 5 to 10 minutes each time, or as directed by your healthcare provider. Change the direction of your movement after 1 minute of motion.  Home care  · Wear your sling as directed.  · Use pain medicine as directed by your healthcare provider.  Follow-up care  Make a follow-up appointment with your healthcare provider, or as advised.     When to call your healthcare provider  Call 911 right away if you have:  · Chest pain  · Shortness of breath  Otherwise, call your healthcare provider right away if you have:  · Fever of 100.4°F  (38.0°C) or higher, or as advised  · Shaking chills  · Increasing shoulder pain  · Pain that is not relieved by medicine  · Pain or swelling in the arm on the side of your surgery  · Numbness, tingling, or blue-gray color of your arm or  fingers on the side of your surgery  · Increased swelling or redness around the incision  · Drainage or oozing around the incision   Date Last Reviewed: 7/1/2016 © 2000-2017 [employer]. [address] [place] [address], [place] [address]. All rights reserved. This information is not intended as a substitute for professional medical care. Always follow your healthcare professional's instructions.

## 2020-04-15 NOTE — PROGRESS NOTES
Subjective:       Patient ID: Courtney Plummer is a 84 y.o. female.    Chief Complaint: Arm Pain (right side since January 14, 2020) and Neck Pain (right side since January 14, 2020)  Courtney Plummer is a 84 y.o. year old female who presents today with constant right shoulder pain that started in January 2020.  She is Right hand dominant.  The pain began after she has blood work drawn.  She states the tourniquet was tight and left a bruise.  There is not a history of previous injury or surgery to the shoulder.  US neg for DVT.    significant pain along the deltoid.  Pain is worse with any activity.  She has tried topical creams.  She does not like taking NSAIDS.  The original injury was from a tight tourniquet on her arm after giving blood.  She denies pain radiating down her arm.  No numbness or tingling.    She has had pain in the same area of her deltoid where the tourniquet was located.  The pain is described as achy, 5/10.  It is aggravated by activity.  She denies numbness, tingling or weakness.  Previous treatments include rest which have provided minimal relief.  There is not a history of previous injury or surgery to the shoulder.       HPI  Review of Systems    Objective:      Physical Exam    Assessment:       1. Right arm pain    2. Osteoporosis, post-menopausal    3. Compression fracture of T12 vertebra, sequela    4. High risk medication use, forteo 12/2019    5. Essential hypertension    6. Generalized anxiety disorder    7. History of back surgery, 09- Dr. 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 Thyroid disorder, nodule removed 1969.    9. Vitamin B 12 deficiency        Plan:   Courtney was seen today for arm pain and neck pain.    Diagnoses and all orders for this visit:    Right arm pain    Osteoporosis, post-menopausal    Compression fracture of T12 vertebra, sequela    High risk medication use, forteo 12/2019    Essential  hypertension    Generalized anxiety disorder    History of back surgery, 09- Dr. 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    Vitamin B 12 deficiency

## 2020-04-15 NOTE — PROGRESS NOTES
Subjective:       Patient ID: Courtney Plummer is a 84 y.o. female.    Chief Complaint: Arm Pain (right side since January 14, 2020) and Neck Pain (right side since January 14, 2020)  This dictation was performed using using [employer].  Her most recent laboratory work is reviewed, January 14, 2020.  There is a concern regarding her blood count which has  a slight downward drift.  No GI symptoms no obvious blood loss.    On the day that she had her blood work drawn January 14, 2020, the tourniquet was placed on her right arm in such a way that she had a hematoma which developed roughly over the insertion of her deltoid tendon.  Since then her arm has been continuously painful and she was favoring it.  She seen orthopedics twice for this problem.  Now, a certain way she reaches she gets sharp pain in her shoulder.    For the past couple of weeks she has noticed a strange feeling in the right side of her throat when she swallows.  She has never had any choking.  She does not have any feeling in her neck with movement.  She does not have a foreign body sensation present,  It is just when she swallows it feels like there is something there.  She does not recall eating fish with obvious bones visible.    She has osteoporosis and she is on Forteo daily this month 1 year.  She like to get a bone mineral density study checked but she had a bone mineral density study March of 2019.    Her back has not been hurting her where she has the compression fracture and she has had methylmethacrylate treatment.    She has been checking her blood pressure periodically and is typically as it was today 130 or less systolic and less than 80 diastolic.    She has a generalized anxiety disorder with panic attacks but she has been doing okay recently.  She is living with her ex- but they have not fully reconciled and this Will not be a long-term living situation.  He Will need to find a new residence.  HPI  Review of Systems    Constitutional: Positive for activity change. Negative for unexpected weight change.   HENT: Positive for hearing loss. Negative for rhinorrhea and trouble swallowing.    Eyes: Negative for discharge and visual disturbance.   Respiratory: Negative for chest tightness and wheezing.    Cardiovascular: Negative for chest pain and palpitations.   Gastrointestinal: Negative for blood in stool, constipation, diarrhea and vomiting.   Endocrine: Negative for polydipsia and polyuria.   Genitourinary: Negative for difficulty urinating, dysuria, hematuria and menstrual problem.   Musculoskeletal: Positive for arthralgias. Negative for joint swelling and neck pain.   Neurological: Negative for weakness and headaches.   Psychiatric/Behavioral: Negative for confusion and dysphoric mood.       Objective:      Physical Exam   Constitutional: She is oriented to person, place, and time. She appears well-developed and well-nourished. No distress.   HENT:   Head: Normocephalic and atraumatic.   Right Ear: External ear normal.   Left Ear: External ear normal.   Nose: Nose normal.   Mouth/Throat: Oropharynx is clear and moist. No oropharyngeal exudate.   Eyes: Pupils are equal, round, and reactive to light. Conjunctivae and EOM are normal. Right eye exhibits no discharge. No scleral icterus.   Neck: Normal range of motion and full passive range of motion without pain. Neck supple. No spinous process tenderness and no muscular tenderness present. Carotid bruit is not present. No thyromegaly present.   Cardiovascular: Normal rate, regular rhythm, S1 normal, S2 normal, normal heart sounds and intact distal pulses. Exam reveals no gallop and no friction rub.   No murmur heard.  Pulmonary/Chest: Effort normal and breath sounds normal. No respiratory distress. She has no wheezes. She has no rales. She exhibits no tenderness.   Abdominal: Soft. Bowel sounds are normal. She exhibits no distension and no mass. There is no tenderness. There is  no rebound and no guarding.   Genitourinary: Pelvic exam was performed with patient supine. Uterus is not deviated, not enlarged, not fixed and not tender. Cervix exhibits no motion tenderness, no discharge and no friability. Right adnexum displays no mass, no tenderness and no fullness. Left adnexum displays no mass, no tenderness and no fullness.   Musculoskeletal: Normal range of motion. She exhibits no edema or tenderness.   Range of motion in the right shoulder is painful.   Lymphadenopathy:        Head (right side): No submental and no submandibular adenopathy present.        Head (left side): No submental and no submandibular adenopathy present.     She has no cervical adenopathy.        Right cervical: No superficial cervical, no deep cervical and no posterior cervical adenopathy present.       Left cervical: No superficial cervical, no deep cervical and no posterior cervical adenopathy present.        Right axillary: No pectoral and no lateral adenopathy present.        Left axillary: No pectoral and no lateral adenopathy present.       Right: No supraclavicular adenopathy present.        Left: No supraclavicular adenopathy present.   Neurological: She is alert and oriented to person, place, and time. She has normal reflexes. No cranial nerve deficit. She exhibits normal muscle tone. Coordination normal.   Skin: Skin is warm and dry. No rash noted.   Psychiatric: She has a normal mood and affect. Her behavior is normal. Her mood appears not anxious. Her speech is not rapid and/or pressured. She is not agitated. She does not exhibit a depressed mood.   Normal behavior, thought content, insight and judgement.   Nursing note and vitals reviewed.      Assessment:       1. Right arm pain    2. Sensation of lump in throat    3. Osteoporosis, post-menopausal    4. Compression fracture of T12 vertebra, sequela    5. High risk medication use, forteo 12/2019    6. Essential hypertension    7. Generalized anxiety  disorder    8. History of back surgery, 09- Dr. 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 Thyroid disorder, nodule removed 1969.    10. Vitamin B 12 deficiency        Plan:   Courtney was seen today for arm pain and neck pain.    Diagnoses and all orders for this visit:    Right arm pain, she has now developed a slight adhesive capsulitis, rotator cuff tendinitis and it is recommended that she do the exercises attached to the after visit summary on a daily basis to restore full range of motion and to relieve pain.  She Will report on her progress in 2 weeks.    Sensation of lump in throat, I do not find anything on physical examination.  She is worried because she smoked for 20 years.  I am also concerned.  I would like for her to call in a couple of weeks to let me no was happening with this.    Osteoporosis, post-menopausal, I might be able to order a bone mineral density study but not sure if Medicare would pay for it    Compression fracture of T12 vertebra, sequela    High risk medication use, forteo 12/2019    Essential hypertension, continue to monitor    Generalized anxiety disorder, continue Paxil    History of back surgery, 09- Dr. 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    Vitamin B 12 deficiency, checked in 2018 and was 1000.

## 2020-04-15 NOTE — Clinical Note
virtual visit two weeks for abn throat sensation and right armAlso I was able to put an order in for a bone mineral density study it looks like Medicare is going to pay for it since she is on Forteo.  This Will need to be scheduled

## 2020-04-27 ENCOUNTER — TELEPHONE (OUTPATIENT)
Dept: ORTHOPEDICS | Facility: CLINIC | Age: 85
End: 2020-04-27

## 2020-04-27 NOTE — TELEPHONE ENCOUNTER
----- Message from Malaika Chatman sent at 4/27/2020 11:08 AM CDT -----  Contact: pt  Reason: Calling to speak with staff pertaining to med question. Pt says she sent a message last week but have not gotten a reponse    Communication: [phone]

## 2020-04-30 ENCOUNTER — OFFICE VISIT (OUTPATIENT)
Dept: INTERNAL MEDICINE | Facility: CLINIC | Age: 85
End: 2020-04-30
Payer: MEDICARE

## 2020-04-30 DIAGNOSIS — Z79.899 HIGH RISK MEDICATION USE: ICD-10-CM

## 2020-04-30 DIAGNOSIS — Z87.81 HISTORY OF VERTEBRAL COMPRESSION FRACTURE: ICD-10-CM

## 2020-04-30 DIAGNOSIS — F41.1 GENERALIZED ANXIETY DISORDER: ICD-10-CM

## 2020-04-30 DIAGNOSIS — M81.0 OSTEOPOROSIS, POST-MENOPAUSAL: ICD-10-CM

## 2020-04-30 DIAGNOSIS — R09.A2 SENSATION OF LUMP IN THROAT: ICD-10-CM

## 2020-04-30 DIAGNOSIS — M75.01 ADHESIVE CAPSULITIS OF RIGHT SHOULDER: Primary | ICD-10-CM

## 2020-04-30 DIAGNOSIS — S22.080S COMPRESSION FRACTURE OF T12 VERTEBRA, SEQUELA: ICD-10-CM

## 2020-04-30 DIAGNOSIS — Z87.891 FORMER SMOKER: ICD-10-CM

## 2020-04-30 PROCEDURE — 99214 OFFICE O/P EST MOD 30 MIN: CPT | Mod: HCNC,95,, | Performed by: INTERNAL MEDICINE

## 2020-04-30 PROCEDURE — 99214 PR OFFICE/OUTPT VISIT, EST, LEVL IV, 30-39 MIN: ICD-10-PCS | Mod: HCNC,95,, | Performed by: INTERNAL MEDICINE

## 2020-04-30 PROCEDURE — 1159F MED LIST DOCD IN RCRD: CPT | Mod: HCNC,95,, | Performed by: INTERNAL MEDICINE

## 2020-04-30 PROCEDURE — 1159F PR MEDICATION LIST DOCUMENTED IN MEDICAL RECORD: ICD-10-PCS | Mod: HCNC,95,, | Performed by: INTERNAL MEDICINE

## 2020-04-30 PROCEDURE — 99499 RISK ADDL DX/OHS AUDIT: ICD-10-PCS | Mod: 95,,, | Performed by: INTERNAL MEDICINE

## 2020-04-30 PROCEDURE — 1101F PT FALLS ASSESS-DOCD LE1/YR: CPT | Mod: HCNC,CPTII,95, | Performed by: INTERNAL MEDICINE

## 2020-04-30 PROCEDURE — 1101F PR PT FALLS ASSESS DOC 0-1 FALLS W/OUT INJ PAST YR: ICD-10-PCS | Mod: HCNC,CPTII,95, | Performed by: INTERNAL MEDICINE

## 2020-04-30 PROCEDURE — 99499 UNLISTED E&M SERVICE: CPT | Mod: 95,,, | Performed by: INTERNAL MEDICINE

## 2020-04-30 NOTE — PROGRESS NOTES
He can leave him on theent epic tSubjective:       Patient ID: Courtney Plummer is a 85 y.o. female.    Chief Complaint:  Shoulder pain has improved  Lump throat went away   Continuing on forteo  I was able to get the bone mineral density study ordered  She has an appointment with me in July  exercises helped shoulder   HPI  Review of Systems   Constitutional: Negative for activity change, appetite change, chills, fatigue, fever and unexpected weight change.   HENT: Negative for hearing loss.    Eyes: Negative for visual disturbance.   Respiratory: Negative for cough, chest tightness, shortness of breath and wheezing.    Cardiovascular: Negative for chest pain, palpitations and leg swelling.   Gastrointestinal: Negative for abdominal pain, constipation, nausea and vomiting.   Genitourinary: Negative for dysuria, frequency and urgency.   Musculoskeletal: Negative for arthralgias, back pain, gait problem, joint swelling and myalgias.   Skin: Negative for rash.   Neurological: Negative for light-headedness and headaches.   Psychiatric/Behavioral: Negative for dysphoric mood and sleep disturbance. The patient is not nervous/anxious.        Objective:      Physical Exam   Constitutional: She is oriented to person, place, and time. She appears well-developed and well-nourished. No distress.   HENT:   Head: Normocephalic and atraumatic.   Right Ear: External ear normal.   Left Ear: External ear normal.   Nose: Nose normal.   Mouth/Throat: Oropharynx is clear and moist. No oropharyngeal exudate.   Eyes: Pupils are equal, round, and reactive to light. Conjunctivae and EOM are normal. Right eye exhibits no discharge. No scleral icterus.   Neck: Normal range of motion and full passive range of motion without pain. Neck supple. No spinous process tenderness and no muscular tenderness present. Carotid bruit is not present. No thyromegaly present.   Cardiovascular: Normal rate, regular rhythm, S1 normal, S2 normal, normal  heart sounds and intact distal pulses. Exam reveals no gallop and no friction rub.   No murmur heard.  Pulmonary/Chest: Effort normal and breath sounds normal. No respiratory distress. She has no wheezes. She has no rales. She exhibits no tenderness.   Abdominal: Soft. Bowel sounds are normal. She exhibits no distension and no mass. There is no tenderness. There is no rebound and no guarding.   Genitourinary: Pelvic exam was performed with patient supine. Uterus is not deviated, not enlarged, not fixed and not tender. Cervix exhibits no motion tenderness, no discharge and no friability. Right adnexum displays no mass, no tenderness and no fullness. Left adnexum displays no mass, no tenderness and no fullness.   Musculoskeletal: Normal range of motion. She exhibits no edema or tenderness.   Lymphadenopathy:        Head (right side): No submental and no submandibular adenopathy present.        Head (left side): No submental and no submandibular adenopathy present.     She has no cervical adenopathy.        Right cervical: No superficial cervical, no deep cervical and no posterior cervical adenopathy present.       Left cervical: No superficial cervical, no deep cervical and no posterior cervical adenopathy present.        Right axillary: No pectoral and no lateral adenopathy present.        Left axillary: No pectoral and no lateral adenopathy present.       Right: No supraclavicular adenopathy present.        Left: No supraclavicular adenopathy present.   Neurological: She is alert and oriented to person, place, and time. She has normal reflexes. No cranial nerve deficit. She exhibits normal muscle tone. Coordination normal.   Skin: Skin is warm and dry. No rash noted.   Psychiatric: She has a normal mood and affect. Her behavior is normal. Her mood appears not anxious. Her speech is not rapid and/or pressured. She is not agitated. She does not exhibit a depressed mood.   Normal behavior, thought content, insight and  judgement.   Nursing note and vitals reviewed.      Assessment:       1. Adhesive capsulitis of right shoulder    2. Sensation of lump in throat    3. Former smoker    4. Generalized anxiety disorder    5. Osteoporosis, post-menopausal    6. History of vertebral compression fracture    7. Compression fracture of T12 vertebra, sequela    8. High risk medication use, forteo 12/2019        Plan:   Diagnoses and all orders for this visit:    Adhesive capsulitis of right shoulder    Sensation of lump in throat    Former smoker    Generalized anxiety disorder    Osteoporosis, post-menopausal    History of vertebral compression fracture    Compression fracture of T12 vertebra, sequela    High risk medication use, forteo 12/2019      Medication List with Changes/Refills   Current Medications    AMOXICILLIN-CLAVULANATE 500-125MG (AUGMENTIN) 500-125 MG TAB    Take 1 tablet (500 mg total) by mouth 2 (two) times daily.    BIOTIN ORAL    Take by mouth.    FLUTICASONE PROPIONATE (FLONASE) 50 MCG/ACTUATION NASAL SPRAY    1 spray (50 mcg total) by Each Nostril route once daily.    HYDROCODONE-ACETAMINOPHEN (NORCO) 5-325 MG PER TABLET    Take half to 1 tablet every 6 hr as needed to suppress cough    MAGNESIUM OXIDE (MAG-OX) 400 MG TABLET        MULTIVITAMIN (ONE DAILY MULTIVITAMIN) PER TABLET    Take 1 tablet by mouth once daily. Takes 3x weekly    PAROXETINE (PAXIL) 20 MG TABLET    Take 1 tablet (20 mg total) by mouth every morning.    POTASSIUM GLUCONATE 595 MG (99 MG) TAB        TERIPARATIDE (FORTEO) 20 MCG/DOSE - 600 MCG/2.4 ML PNIJ    Inject 0.08 mLs (20 mcg total) into the skin once daily.    TRIAMTERENE-HYDROCHLOROTHIAZIDE 37.5-25 MG (DYAZIDE) 37.5-25 MG PER CAPSULE    Take 1 capsule by mouth once daily. 1 Capsule Oral Every morning    VITAMIN D3 1,000 UNIT CAPSULE

## 2020-06-08 DIAGNOSIS — M81.6 LOCALIZED OSTEOPOROSIS OF LEQUESNE: ICD-10-CM

## 2020-06-08 DIAGNOSIS — M80.80XA PATHOLOGICAL FRACTURE DUE TO OSTEOPOROSIS, UNSPECIFIED FRACTURE SITE, UNSPECIFIED OSTEOPOROSIS TYPE, INITIAL ENCOUNTER: ICD-10-CM

## 2020-06-08 DIAGNOSIS — M81.0 OSTEOPOROSIS, UNSPECIFIED OSTEOPOROSIS TYPE, UNSPECIFIED PATHOLOGICAL FRACTURE PRESENCE: Primary | ICD-10-CM

## 2020-06-08 RX ORDER — TERIPARATIDE 250 UG/ML
20 INJECTION, SOLUTION SUBCUTANEOUS DAILY
Qty: 2.4 ML | Refills: 4 | Status: SHIPPED | OUTPATIENT
Start: 2020-06-08 | End: 2020-07-07

## 2020-06-17 ENCOUNTER — PES CALL (OUTPATIENT)
Dept: ADMINISTRATIVE | Facility: CLINIC | Age: 85
End: 2020-06-17

## 2020-06-23 RX ORDER — TRIAMTERENE AND HYDROCHLOROTHIAZIDE 37.5; 25 MG/1; MG/1
1 CAPSULE ORAL DAILY
Qty: 90 CAPSULE | Refills: 0 | Status: SHIPPED | OUTPATIENT
Start: 2020-06-23 | End: 2020-07-14 | Stop reason: SDUPTHER

## 2020-06-29 ENCOUNTER — TELEPHONE (OUTPATIENT)
Dept: ORTHOPEDICS | Facility: CLINIC | Age: 85
End: 2020-06-29

## 2020-06-29 NOTE — TELEPHONE ENCOUNTER
PT called to inform us that [facility] is out of refills for her Forteo. [facility] was messaged on 6/8. Called [facility] to follow up because PT has not gotten her medication from the [facility]. MA with 's office is getting the refills resent to Katie Bingham today, 6/29. -HBR

## 2020-06-29 NOTE — TELEPHONE ENCOUNTER
Called PT to inform her that I got in touch with her [facility] and they are sending more refills to Katie Bingham. Left . -HBR

## 2020-06-29 NOTE — TELEPHONE ENCOUNTER
Called patient at [phone]. Left Message letting patient know that her medication request was received for Forteo and that DS was out of the office this week, but he'd send in when he returned to clinic on Monday.

## 2020-06-30 ENCOUNTER — TELEPHONE (OUTPATIENT)
Dept: ORTHOPEDICS | Facility: CLINIC | Age: 85
End: 2020-06-30

## 2020-06-30 NOTE — TELEPHONE ENCOUNTER
Followed up with patient to make sure she received the voicemail that was left yesterday letting her know that DS was out of the office this week. Patient states that she heard the voicemail. He only concern is that her last dose of Forteo is July 6th . Explained to the patient that I'd follow up with DS once he returned to clinic.

## 2020-07-07 ENCOUNTER — TELEPHONE (OUTPATIENT)
Dept: ORTHOPEDICS | Facility: CLINIC | Age: 85
End: 2020-07-07

## 2020-07-07 RX ORDER — TERIPARATIDE 250 UG/ML
20 INJECTION, SOLUTION SUBCUTANEOUS DAILY
Qty: 2.4 ML | Refills: 11 | Status: SHIPPED | OUTPATIENT
Start: 2020-07-07 | End: 2021-04-22 | Stop reason: ALTCHOICE

## 2020-07-07 NOTE — TELEPHONE ENCOUNTER
Received a Message from Manasa AKERS ([phone]) that the patient still had not received her Forteo Rx. Replied telling her that I was forwarding the message to  for him to address.

## 2020-07-07 NOTE — TELEPHONE ENCOUNTER
Return call to Kavin Tom regarding Courtney Coffey refill on forteo, no answer left message on voice mail

## 2020-07-07 NOTE — TELEPHONE ENCOUNTER
----- Message from [place] sent at 7/7/2020 10:40 AM CDT -----  Regarding: Medication  Contact: Self  Mg  Pt is on last shot of Forteo and needs to have her refill ASAP because pt has called 4 the last month and no one has returned her call     Please Call    Contact  [phone]

## 2020-07-13 ENCOUNTER — TELEPHONE (OUTPATIENT)
Dept: INTERNAL MEDICINE | Facility: CLINIC | Age: 85
End: 2020-07-13

## 2020-07-14 ENCOUNTER — OFFICE VISIT (OUTPATIENT)
Dept: INTERNAL MEDICINE | Facility: CLINIC | Age: 85
End: 2020-07-14
Payer: MEDICARE

## 2020-07-14 VITALS
OXYGEN SATURATION: 99 % | HEIGHT: 62 IN | SYSTOLIC BLOOD PRESSURE: 120 MMHG | DIASTOLIC BLOOD PRESSURE: 66 MMHG | WEIGHT: 120.38 LBS | BODY MASS INDEX: 22.15 KG/M2 | HEART RATE: 91 BPM

## 2020-07-14 VITALS
HEIGHT: 62 IN | DIASTOLIC BLOOD PRESSURE: 66 MMHG | SYSTOLIC BLOOD PRESSURE: 120 MMHG | WEIGHT: 120.38 LBS | BODY MASS INDEX: 22.15 KG/M2 | HEART RATE: 91 BPM

## 2020-07-14 DIAGNOSIS — R35.1 NOCTURIA: ICD-10-CM

## 2020-07-14 DIAGNOSIS — F41.1 GENERALIZED ANXIETY DISORDER: Primary | ICD-10-CM

## 2020-07-14 DIAGNOSIS — I77.9 BILATERAL CAROTID ARTERY DISEASE, UNSPECIFIED TYPE: ICD-10-CM

## 2020-07-14 DIAGNOSIS — M48.061 SPINAL STENOSIS OF LUMBAR REGION, UNSPECIFIED WHETHER NEUROGENIC CLAUDICATION PRESENT: ICD-10-CM

## 2020-07-14 DIAGNOSIS — R09.89 CAROTID BRUIT, UNSPECIFIED LATERALITY: ICD-10-CM

## 2020-07-14 DIAGNOSIS — M54.50 CHRONIC LOW BACK PAIN, UNSPECIFIED BACK PAIN LATERALITY, UNSPECIFIED WHETHER SCIATICA PRESENT: ICD-10-CM

## 2020-07-14 DIAGNOSIS — I10 ESSENTIAL HYPERTENSION: ICD-10-CM

## 2020-07-14 DIAGNOSIS — S22.080S COMPRESSION FRACTURE OF T12 VERTEBRA, SEQUELA: ICD-10-CM

## 2020-07-14 DIAGNOSIS — R63.4 WEIGHT LOSS, NON-INTENTIONAL: ICD-10-CM

## 2020-07-14 DIAGNOSIS — M81.0 OSTEOPOROSIS, POST-MENOPAUSAL: ICD-10-CM

## 2020-07-14 DIAGNOSIS — Z74.09 OTHER REDUCED MOBILITY: ICD-10-CM

## 2020-07-14 DIAGNOSIS — Z87.81 HISTORY OF VERTEBRAL COMPRESSION FRACTURE: ICD-10-CM

## 2020-07-14 DIAGNOSIS — I70.0 ATHEROSCLEROSIS OF AORTA: ICD-10-CM

## 2020-07-14 DIAGNOSIS — F41.1 GENERALIZED ANXIETY DISORDER: ICD-10-CM

## 2020-07-14 DIAGNOSIS — G60.9 IDIOPATHIC PERIPHERAL NEUROPATHY: ICD-10-CM

## 2020-07-14 DIAGNOSIS — F41.0 PANIC ATTACKS: ICD-10-CM

## 2020-07-14 DIAGNOSIS — Z00.00 ENCOUNTER FOR PREVENTIVE HEALTH EXAMINATION: Primary | ICD-10-CM

## 2020-07-14 DIAGNOSIS — E07.9 THYROID DISORDER: ICD-10-CM

## 2020-07-14 DIAGNOSIS — S22.080D COMPRESSION FRACTURE OF T12 VERTEBRA WITH ROUTINE HEALING: ICD-10-CM

## 2020-07-14 DIAGNOSIS — N18.30 STAGE 3 CHRONIC KIDNEY DISEASE: ICD-10-CM

## 2020-07-14 DIAGNOSIS — G89.29 CHRONIC LOW BACK PAIN, UNSPECIFIED BACK PAIN LATERALITY, UNSPECIFIED WHETHER SCIATICA PRESENT: ICD-10-CM

## 2020-07-14 PROBLEM — Z12.31 ENCOUNTER FOR SCREENING MAMMOGRAM FOR MALIGNANT NEOPLASM OF BREAST: Status: RESOLVED | Noted: 2017-02-14 | Resolved: 2020-07-14

## 2020-07-14 PROCEDURE — 3078F PR MOST RECENT DIASTOLIC BLOOD PRESSURE < 80 MM HG: ICD-10-PCS | Mod: HCNC,CPTII,S$GLB, | Performed by: INTERNAL MEDICINE

## 2020-07-14 PROCEDURE — 1159F MED LIST DOCD IN RCRD: CPT | Mod: HCNC,S$GLB,, | Performed by: INTERNAL MEDICINE

## 2020-07-14 PROCEDURE — 99499 UNLISTED E&M SERVICE: CPT | Mod: HCNC,S$GLB,, | Performed by: NURSE PRACTITIONER

## 2020-07-14 PROCEDURE — 99999 PR PBB SHADOW E&M-EST. PATIENT-LVL III: CPT | Mod: PBBFAC,HCNC,, | Performed by: INTERNAL MEDICINE

## 2020-07-14 PROCEDURE — G0439 PR MEDICARE ANNUAL WELLNESS SUBSEQUENT VISIT: ICD-10-PCS | Mod: HCNC,S$GLB,, | Performed by: NURSE PRACTITIONER

## 2020-07-14 PROCEDURE — 1159F PR MEDICATION LIST DOCUMENTED IN MEDICAL RECORD: ICD-10-PCS | Mod: HCNC,S$GLB,, | Performed by: INTERNAL MEDICINE

## 2020-07-14 PROCEDURE — 3074F PR MOST RECENT SYSTOLIC BLOOD PRESSURE < 130 MM HG: ICD-10-PCS | Mod: HCNC,CPTII,S$GLB, | Performed by: NURSE PRACTITIONER

## 2020-07-14 PROCEDURE — 99214 PR OFFICE/OUTPT VISIT, EST, LEVL IV, 30-39 MIN: ICD-10-PCS | Mod: HCNC,S$GLB,, | Performed by: INTERNAL MEDICINE

## 2020-07-14 PROCEDURE — 99499 RISK ADDL DX/OHS AUDIT: ICD-10-PCS | Mod: HCNC,S$GLB,, | Performed by: NURSE PRACTITIONER

## 2020-07-14 PROCEDURE — 3078F DIAST BP <80 MM HG: CPT | Mod: HCNC,CPTII,S$GLB, | Performed by: INTERNAL MEDICINE

## 2020-07-14 PROCEDURE — 3074F SYST BP LT 130 MM HG: CPT | Mod: HCNC,CPTII,S$GLB, | Performed by: NURSE PRACTITIONER

## 2020-07-14 PROCEDURE — 3078F DIAST BP <80 MM HG: CPT | Mod: HCNC,CPTII,S$GLB, | Performed by: NURSE PRACTITIONER

## 2020-07-14 PROCEDURE — 3074F SYST BP LT 130 MM HG: CPT | Mod: HCNC,CPTII,S$GLB, | Performed by: INTERNAL MEDICINE

## 2020-07-14 PROCEDURE — 99999 PR PBB SHADOW E&M-EST. PATIENT-LVL IV: CPT | Mod: PBBFAC,HCNC,, | Performed by: NURSE PRACTITIONER

## 2020-07-14 PROCEDURE — 99999 PR PBB SHADOW E&M-EST. PATIENT-LVL III: ICD-10-PCS | Mod: PBBFAC,HCNC,, | Performed by: INTERNAL MEDICINE

## 2020-07-14 PROCEDURE — 3074F PR MOST RECENT SYSTOLIC BLOOD PRESSURE < 130 MM HG: ICD-10-PCS | Mod: HCNC,CPTII,S$GLB, | Performed by: INTERNAL MEDICINE

## 2020-07-14 PROCEDURE — 1101F PR PT FALLS ASSESS DOC 0-1 FALLS W/OUT INJ PAST YR: ICD-10-PCS | Mod: HCNC,CPTII,S$GLB, | Performed by: INTERNAL MEDICINE

## 2020-07-14 PROCEDURE — 99214 OFFICE O/P EST MOD 30 MIN: CPT | Mod: HCNC,S$GLB,, | Performed by: INTERNAL MEDICINE

## 2020-07-14 PROCEDURE — 99999 PR PBB SHADOW E&M-EST. PATIENT-LVL IV: ICD-10-PCS | Mod: PBBFAC,HCNC,, | Performed by: NURSE PRACTITIONER

## 2020-07-14 PROCEDURE — 3078F PR MOST RECENT DIASTOLIC BLOOD PRESSURE < 80 MM HG: ICD-10-PCS | Mod: HCNC,CPTII,S$GLB, | Performed by: NURSE PRACTITIONER

## 2020-07-14 PROCEDURE — 1101F PT FALLS ASSESS-DOCD LE1/YR: CPT | Mod: HCNC,CPTII,S$GLB, | Performed by: INTERNAL MEDICINE

## 2020-07-14 PROCEDURE — G0439 PPPS, SUBSEQ VISIT: HCPCS | Mod: HCNC,S$GLB,, | Performed by: NURSE PRACTITIONER

## 2020-07-14 RX ORDER — TRIAMTERENE AND HYDROCHLOROTHIAZIDE 37.5; 25 MG/1; MG/1
1 CAPSULE ORAL EVERY MORNING
Qty: 90 CAPSULE | Refills: 3 | Status: SHIPPED | OUTPATIENT
Start: 2020-07-14 | End: 2021-05-19 | Stop reason: SDUPTHER

## 2020-07-14 RX ORDER — MIRTAZAPINE 7.5 MG/1
7.5 TABLET, FILM COATED ORAL NIGHTLY
Qty: 90 TABLET | Refills: 3 | Status: SHIPPED | OUTPATIENT
Start: 2020-07-14 | End: 2020-09-14 | Stop reason: SINTOL

## 2020-07-14 NOTE — PROGRESS NOTES
"  Courtney Plummer presented for a  Medicare AWV and comprehensive Health Risk Assessment today. The following components were reviewed and updated:    · Medical history  · Family History  · Social history  · Allergies and Current Medications  · Health Risk Assessment  · Health Maintenance  · Care Team     ** See Completed Assessments for Annual Wellness Visit within the encounter summary.**       The following assessments were completed:  · Living Situation  · CAGE  · Depression Screening  · Timed Get Up and Go  · Whisper Test  · Cognitive Function Screening      ·   · Nutrition Screening  · ADL Screening  · PAQ Screening    Vitals:    07/14/20 1314   BP: 120/66   Pulse: 91   Weight: 54.6 kg (120 lb 5.9 oz)   Height: 5' 2" (1.575 m)     Body mass index is 22.02 kg/m².  Physical Exam  Vitals signs and nursing note reviewed.   Constitutional:       Appearance: Normal appearance.   Musculoskeletal: Normal range of motion.   Skin:     General: Skin is warm and dry.   Neurological:      General: No focal deficit present.      Mental Status: She is alert.   Psychiatric:         Mood and Affect: Mood normal.           Diagnoses and health risks identified today and associated recommendations/orders:    1. Encounter for preventive health examination  Here for Health Risk Assessment/Annual Wellness Visit.  Health maintenance reviewed and updated. Follow up in one year.    2. Essential hypertension  Chronic, stable on current medication. Followed by PCP.    3. Atherosclerosis of aorta  Chronic, stable. Noted CXR 10/08/17. Followed by PCP.    4. Carotid bruit, unspecified laterality  Chronic, stable. Followed by PCP.    5. Bilateral carotid artery disease, unspecified type  Chronic, stable. Small amount of homogenous plaque bilaterally noted on U/S 2/14/11. Followed by PCP.    6. Generalized anxiety disorder  Chronic, reporting increased anxiety. PHQ-2 score 0. Seen by PCP today with medication change. Followed by PCP.    7. " Panic attacks  Chronic, reporting increased anxiety. PHQ-2 score 0. Seen by PCP today with medication change. Followed by PCP.    8. Thyroid disorder, nodule removed 1969.  Chronic, stable. Followed by PCP.    9. Weight loss, non-intentional, anxiety related  Chronic, weight decreased 12 pounds from AWV/HRA 9/2019 secondary to anxiety. Seen by PCP today.    10. Osteoporosis, post-menopausal  Chronic, stable on current medications. Followed by PCP.    11. History of vertebral compression fracture  Stab;e. Followed by PCP, Orthopedics.    12. Chronic low back pain, unspecified back pain laterality, unspecified whether sciatica present  Chronic, stable. Followed by PCP, Neurosurgery.    13. Spinal stenosis of lumbar region, unspecified whether neurogenic claudication present  Chronic, stable. Followed by PCP, Neurosurgery.    14. Idiopathic peripheral neuropathy, since 2014, back surgery  Chronic, stable. Followed by PCP.    15. Stage 3 chronic kidney disease  New onset. Followed by PCP.    16. Other reduced mobility  Chronic, no reported falls, no assistive device for ambulation      Provided Courtney with a 5-10 year written screening schedule and personal prevention plan. Recommendations were developed using the USPSTF age appropriate recommendations. Education, counseling, and referrals were provided as needed. After Visit Summary printed and given to patient which includes a list of additional screenings\tests needed.    No follow-ups on file.    Margarita Christensen NP

## 2020-07-14 NOTE — PATIENT INSTRUCTIONS
Counseling and Referral of Other Preventative  (Italic type indicates deductible and co-insurance are waived)    Patient Name: Courtney Plummer  Today's Date: 7/14/2020    Health Maintenance       Date Due Completion Date    Shingles Vaccine (2 of 3) 02/04/2010 12/10/2009 - Obtain new shingles vaccine - SHINGRIX - when available    Influenza Vaccine (1) 09/01/2020 10/13/2019 (Done)    Override on 10/13/2019: Done    DEXA SCAN 03/25/2021 3/25/2019    Override on 4/25/2014: Not Clinically Appropriate    Override on 1/4/2011: Done    Lipid Panel 09/06/2023 9/6/2018    TETANUS VACCINE 12/03/2025 12/3/2015 (Done)    Override on 12/3/2015: Done        No orders of the defined types were placed in this encounter.    The following information is provided to all patients.  This information is to help you find resources for any of the problems found today that may be affecting your health:                Living healthy guide: www.Replaced by [facility].[place].gov      Understanding Diabetes: www.diabetes.org      Eating healthy: www.cdc.gov/healthyweight      CDC home safety checklist: www.cdc.gov/steadi/patient.html      Agency on Aging: www.goea.[place].gov      Alcoholics anonymous (AA): www.aa.org      Physical Activity: www.kendrick.nih.gov/jr5mfmz      Tobacco use: www.quitwithusla.org

## 2020-07-14 NOTE — PATIENT INSTRUCTIONS
Low-Salt Choices  Eating salt (sodium) can make your body retain too much water. Excess water makes your heart work harder. Canned, packaged, and frozen foods are easy to prepare, but they are often high in sodium. Here are some ideas for low-salt foods you can easily prepare yourself.    For breakfast  · Fruit or 100% fruit juice  · Whole-wheat bread or an English muffin. Compare sodium content on labels.  · Low-fat milk or yogurt  · Unsalted eggs  · Shredded wheat  · Corn tortillas  · Unsalted steamed rice  · Regular (not instant) hot cereal, made without salt  Stay away from:  · Sausage, cain, and ham  · Flour tortillas  · Packaged muffins, pancakes, and biscuits  · Instant hot cereals  · Cottage cheese  For lunch and dinner  · Fresh fish, chicken, turkey, or meat--baked, broiled, or roasted without salt  · Dry beans, cooked without salt  · Tofu, stir-fried without salt  · Unsalted fresh fruit and vegetables, or frozen or canned fruit and vegetables with no added salt  Stay away from:  · Lunch or deli meat that is cured or smoked  · Cheese  · Tomato juice and catsup  · Canned vegetables, soups, and fish not labeled as no-salt-added or reduced sodium  · Packaged gravies and sauces  · Olives, pickles, and relish  · Bottled salad dressings  For snacks and desserts  · Yogurt  · Unsalted, air popped popcorn  · Unsalted nuts or seeds  Stay away from:  · Pies and cakes  · Packaged dessert mixes  · Pizza  · Canned and packaged puddings  · Pretzels, chips, crackers, and nuts--unless the label says unsalted  Date Last Reviewed: 6/17/2015  © 4828-3504 [employer]. [address] [place] [address], [place] [address]. All rights reserved. This information is not intended as a substitute for professional medical care. Always follow your healthcare professional's instructions.        Tips for Using Less Salt    Most people with heart problems need to eat less salt (sodium). Reducing the amount of salt you eat may help  control your blood pressure. The higher your blood pressure, the greater your risk for heart disease, stroke, blindness, and kidney problems.  At the store  · Make low-salt choices by reading labels carefully. Look for the total amount of sodium per serving.  · Use more fresh food. Buy more fruits and vegetables. Select lean meats, fish, and poultry.  · Use fewer frozen, canned, and packaged foods which often contain a lot of sodium.  · Use plain frozen vegetables without sauces or toppings. These products are often low- or no-sodium.  · Opt for reduced-sodium or no-salt-added versions of canned vegetables and soups.  In the kitchen  · Don't add salt to food when you're cooking. Season with flavorings such as onion, garlic, pepper, salt-free herbal blends, and lemon or lime juice.  · Use a cookbook containing low-salt recipes. It can give you ideas for tasty meals that are healthy for your heart.  · Sprinkle salt-free herbal blends on vegetables and meat.  · Drain and rinse canned foods, such as canned beans and vegetables, before cooking or eating.  Eating out  · Tell the  you're on a low-salt diet. Ask questions about the menu.  · Order fish, chicken, and meat broiled, baked, poached, or grilled without salt, butter, or breading.  · Use lemon, pepper, and salt-free herb mixes to add flavor.  · Choose plain steamed rice, boiled noodles, and baked or boiled potatoes. Top potatoes with chives and a little sour cream.     Beware! Salt goes by many other names. Limit foods with these words listed as ingredients: salt, sodium, soy sauce, baking soda, baking powder, MSG, monosodium, Na (the chemical symbol for sodium). Some antacids are also high in salt.   Date Last Reviewed: 6/19/2015  © 4124-9694 [employer]. [address] [place] [address], [place] [address]. All rights reserved. This information is not intended as a substitute for professional medical care. Always follow your healthcare professional's  instructions.

## 2020-07-17 NOTE — PROGRESS NOTES
Subjective:       Patient ID: Courtney Plummer is a 85 y.o. female.    Chief Complaint: Follow-up   She has been treated for GA D and panic attacks for years.  Is so much worse lately  She has got to move her ex-'s driving her crazy  Her brother  yesterday  She is experiencing nocturia 3-4 times a night  Her blood pressure has been normal  She is getting out seeing friends and playing cards 3 times a week  Follow-up  Associated symptoms include arthralgias. Pertinent negatives include no chest pain, headaches, joint swelling, neck pain, vomiting or weakness.     Review of Systems   Constitutional: Negative for activity change and unexpected weight change.   HENT: Positive for hearing loss. Negative for rhinorrhea and trouble swallowing.    Eyes: Negative for discharge and visual disturbance.   Respiratory: Negative for chest tightness and wheezing.    Cardiovascular: Negative for chest pain and palpitations.   Gastrointestinal: Negative for blood in stool, constipation, diarrhea and vomiting.   Endocrine: Negative for polydipsia and polyuria.   Genitourinary: Negative for difficulty urinating, dysuria, hematuria and menstrual problem.   Musculoskeletal: Positive for arthralgias. Negative for joint swelling and neck pain.   Neurological: Negative for weakness and headaches.   Psychiatric/Behavioral: Positive for dysphoric mood. Negative for confusion.       Objective:      Physical Exam  HENT:      Head: Atraumatic.   Eyes:      General: No scleral icterus.     Conjunctiva/sclera: Conjunctivae normal.   Neck:      Musculoskeletal: Neck supple.   Cardiovascular:      Rate and Rhythm: Normal rate and regular rhythm.   Pulmonary:      Effort: Pulmonary effort is normal.      Breath sounds: Normal breath sounds.   Abdominal:      Palpations: Abdomen is soft.      Tenderness: There is no abdominal tenderness.   Lymphadenopathy:      Cervical: No cervical adenopathy.   Skin:     General: Skin is warm and dry.    Neurological:      Mental Status: She is alert.   Psychiatric:         Behavior: Behavior normal.         Assessment:       1. Generalized anxiety disorder    2. Atherosclerosis of aorta    3. Essential hypertension    4. Compression fracture of T12 vertebra, sequela    5. Osteoporosis, post-menopausal    6. Nocturia, x 3-4     7. Compression fracture of T12 vertebra with routine healing    8. Weight loss, non-intentional, anxiety related        Plan:   Courtney was seen today for follow-up.    Diagnoses and all orders for this visit:    Generalized anxiety disorder    Atherosclerosis of aorta    Essential hypertension  -     Hypertension Digital Medicine (HDMP) Enrollment Order    Compression fracture of T12 vertebra, sequela    Osteoporosis, post-menopausal    Nocturia, x 3-4, not sure what is going on here could be bladder irritability.  Recommend stopping liquids for hours before bedtime.  Sitting on the toilet for 10 min to make sure that the bladder is completely empty for going to bed.  Do not take over-the-counter anticholinergic medications.      Compression fracture of T12 vertebra with routine healing    Weight loss, non-intentional, anxiety related    Other orders  -     mirtazapine (REMERON) 7.5 MG Tab; Take 1 tablet (7.5 mg total) by mouth every evening.  -     triamterene-hydrochlorothiazide 37.5-25 mg (DYAZIDE) 37.5-25 mg per capsule; Take 1 capsule by mouth every morning. 1 Capsule Oral Every morning    Follow up in about 3 months (around 10/14/2020).

## 2020-08-17 NOTE — TELEPHONE ENCOUNTER
Blood pressure is under good control.  We will continue the current regimen.  Will work on regular aerobic exercise and a low salt diet.       Please call  asap

## 2020-09-14 ENCOUNTER — TELEPHONE (OUTPATIENT)
Dept: INTERNAL MEDICINE | Facility: CLINIC | Age: 85
End: 2020-09-14

## 2020-09-14 DIAGNOSIS — F41.1 GENERALIZED ANXIETY DISORDER: Primary | ICD-10-CM

## 2020-09-14 RX ORDER — PAROXETINE HYDROCHLORIDE 20 MG/1
20 TABLET, FILM COATED ORAL DAILY
Qty: 90 TABLET | Refills: 3 | Status: SHIPPED | OUTPATIENT
Start: 2020-09-14 | End: 2021-09-04 | Stop reason: SDUPTHER

## 2020-09-14 NOTE — TELEPHONE ENCOUNTER
----- Message from Fela Hurtado sent at 9/14/2020  9:18 AM CDT -----  Contact: self/[phone]  Pt called in regards to not wanting to take her Rx for mirtazapine (REMERON) 7.5 MG Tab due to its giving her constipation. She would like to start back taking her Rx for paroxetine (PAXIL) 20 MG tablet (Discontinued) 90 tablet 3 1/14/2020 7/14/2020 No  Sig - Route: Take 1 tablet (20 mg total) by mouth every morning.  She would like a call back.    Please advise

## 2020-09-14 NOTE — TELEPHONE ENCOUNTER
Spoke to pt, she stated she took the mirtazapine for 10 days or so and became constipated. After she stopped taking it she resume to having regular bowel movements.     Pt also stated she is having knee surgery on Oct 5 and would like to know if she needs to come in to see you before.

## 2020-09-14 NOTE — TELEPHONE ENCOUNTER
I discontinued mirtazapine and sent the prescription to [facility] mail order pharmacy for Paxil 20 mg tablet  [facility] mail order pharmacy accepted the Paxil prescription  She will have to ask her knee surgeon if a preop clearance is required for her to have knee surgery  I am very sorry to hear that she has to have knee surgery

## 2020-09-29 ENCOUNTER — PATIENT MESSAGE (OUTPATIENT)
Dept: OTHER | Facility: OTHER | Age: 85
End: 2020-09-29

## 2020-10-12 ENCOUNTER — PATIENT MESSAGE (OUTPATIENT)
Dept: ORTHOPEDICS | Facility: CLINIC | Age: 85
End: 2020-10-12

## 2020-12-11 ENCOUNTER — PATIENT MESSAGE (OUTPATIENT)
Dept: ADMINISTRATIVE | Facility: OTHER | Age: 85
End: 2020-12-11

## 2020-12-11 ENCOUNTER — PATIENT MESSAGE (OUTPATIENT)
Dept: OTHER | Facility: OTHER | Age: 85
End: 2020-12-11

## 2021-01-04 ENCOUNTER — PATIENT MESSAGE (OUTPATIENT)
Dept: INTERNAL MEDICINE | Facility: CLINIC | Age: 86
End: 2021-01-04

## 2021-01-05 ENCOUNTER — PATIENT MESSAGE (OUTPATIENT)
Dept: ADMINISTRATIVE | Facility: OTHER | Age: 86
End: 2021-01-05

## 2021-01-12 PROCEDURE — 99453 PR REMOTE MONITR, PHYSIOL PARAM, INITIAL: ICD-10-PCS | Mod: S$GLB,,, | Performed by: INTERNAL MEDICINE

## 2021-01-12 PROCEDURE — 99453 REM MNTR PHYSIOL PARAM SETUP: CPT | Mod: S$GLB,,, | Performed by: INTERNAL MEDICINE

## 2021-01-13 ENCOUNTER — PATIENT MESSAGE (OUTPATIENT)
Dept: ADMINISTRATIVE | Facility: OTHER | Age: 86
End: 2021-01-13

## 2021-01-22 ENCOUNTER — PATIENT MESSAGE (OUTPATIENT)
Dept: ADMINISTRATIVE | Facility: OTHER | Age: 86
End: 2021-01-22

## 2021-01-31 PROCEDURE — 99457 PR MONITORING, PHYSIOL PARAM, REMOTE, 1ST 20 MINS, PER MONTH: ICD-10-PCS | Mod: S$GLB,,, | Performed by: INTERNAL MEDICINE

## 2021-01-31 PROCEDURE — 99457 RPM TX MGMT 1ST 20 MIN: CPT | Mod: S$GLB,,, | Performed by: INTERNAL MEDICINE

## 2021-02-04 ENCOUNTER — IMMUNIZATION (OUTPATIENT)
Dept: PHARMACY | Facility: CLINIC | Age: 86
End: 2021-02-04
Payer: MEDICARE

## 2021-02-04 DIAGNOSIS — Z23 NEED FOR VACCINATION: Primary | ICD-10-CM

## 2021-02-26 ENCOUNTER — PES CALL (OUTPATIENT)
Dept: ADMINISTRATIVE | Facility: CLINIC | Age: 86
End: 2021-02-26

## 2021-03-04 ENCOUNTER — IMMUNIZATION (OUTPATIENT)
Dept: PHARMACY | Facility: CLINIC | Age: 86
End: 2021-03-04
Payer: MEDICARE

## 2021-03-04 DIAGNOSIS — Z23 NEED FOR VACCINATION: Primary | ICD-10-CM

## 2021-04-09 ENCOUNTER — PES CALL (OUTPATIENT)
Dept: ADMINISTRATIVE | Facility: CLINIC | Age: 86
End: 2021-04-09

## 2021-04-21 ENCOUNTER — PATIENT OUTREACH (OUTPATIENT)
Dept: ADMINISTRATIVE | Facility: OTHER | Age: 86
End: 2021-04-21

## 2021-04-21 ENCOUNTER — OFFICE VISIT (OUTPATIENT)
Dept: INTERNAL MEDICINE | Facility: CLINIC | Age: 86
End: 2021-04-21
Payer: MEDICARE

## 2021-04-21 VITALS
HEIGHT: 62 IN | HEART RATE: 68 BPM | OXYGEN SATURATION: 95 % | WEIGHT: 121 LBS | DIASTOLIC BLOOD PRESSURE: 56 MMHG | SYSTOLIC BLOOD PRESSURE: 136 MMHG | BODY MASS INDEX: 22.26 KG/M2

## 2021-04-21 DIAGNOSIS — Z87.81 HISTORY OF VERTEBRAL COMPRESSION FRACTURE: ICD-10-CM

## 2021-04-21 DIAGNOSIS — F41.1 GENERALIZED ANXIETY DISORDER: ICD-10-CM

## 2021-04-21 DIAGNOSIS — R79.89 ABNORMAL TSH: Primary | ICD-10-CM

## 2021-04-21 DIAGNOSIS — M81.0 OSTEOPOROSIS, POST-MENOPAUSAL: ICD-10-CM

## 2021-04-21 DIAGNOSIS — I10 HYPERTENSION, ESSENTIAL: ICD-10-CM

## 2021-04-21 PROCEDURE — 99214 PR OFFICE/OUTPT VISIT, EST, LEVL IV, 30-39 MIN: ICD-10-PCS | Mod: S$GLB,,, | Performed by: PHYSICIAN ASSISTANT

## 2021-04-21 PROCEDURE — 1159F PR MEDICATION LIST DOCUMENTED IN MEDICAL RECORD: ICD-10-PCS | Mod: S$GLB,,, | Performed by: PHYSICIAN ASSISTANT

## 2021-04-21 PROCEDURE — 1126F AMNT PAIN NOTED NONE PRSNT: CPT | Mod: S$GLB,,, | Performed by: PHYSICIAN ASSISTANT

## 2021-04-21 PROCEDURE — 3288F PR FALLS RISK ASSESSMENT DOCUMENTED: ICD-10-PCS | Mod: CPTII,S$GLB,, | Performed by: PHYSICIAN ASSISTANT

## 2021-04-21 PROCEDURE — 1101F PT FALLS ASSESS-DOCD LE1/YR: CPT | Mod: CPTII,S$GLB,, | Performed by: PHYSICIAN ASSISTANT

## 2021-04-21 PROCEDURE — 3288F FALL RISK ASSESSMENT DOCD: CPT | Mod: CPTII,S$GLB,, | Performed by: PHYSICIAN ASSISTANT

## 2021-04-21 PROCEDURE — 1101F PR PT FALLS ASSESS DOC 0-1 FALLS W/OUT INJ PAST YR: ICD-10-PCS | Mod: CPTII,S$GLB,, | Performed by: PHYSICIAN ASSISTANT

## 2021-04-21 PROCEDURE — 99999 PR PBB SHADOW E&M-EST. PATIENT-LVL IV: CPT | Mod: PBBFAC,,, | Performed by: PHYSICIAN ASSISTANT

## 2021-04-21 PROCEDURE — 99999 PR PBB SHADOW E&M-EST. PATIENT-LVL IV: ICD-10-PCS | Mod: PBBFAC,,, | Performed by: PHYSICIAN ASSISTANT

## 2021-04-21 PROCEDURE — 1126F PR PAIN SEVERITY QUANTIFIED, NO PAIN PRESENT: ICD-10-PCS | Mod: S$GLB,,, | Performed by: PHYSICIAN ASSISTANT

## 2021-04-21 PROCEDURE — 1159F MED LIST DOCD IN RCRD: CPT | Mod: S$GLB,,, | Performed by: PHYSICIAN ASSISTANT

## 2021-04-21 PROCEDURE — 99214 OFFICE O/P EST MOD 30 MIN: CPT | Mod: S$GLB,,, | Performed by: PHYSICIAN ASSISTANT

## 2021-04-22 ENCOUNTER — OFFICE VISIT (OUTPATIENT)
Dept: ENDOCRINOLOGY | Facility: CLINIC | Age: 86
End: 2021-04-22
Payer: MEDICARE

## 2021-04-22 VITALS
WEIGHT: 122.81 LBS | BODY MASS INDEX: 22.6 KG/M2 | RESPIRATION RATE: 18 BRPM | DIASTOLIC BLOOD PRESSURE: 62 MMHG | HEART RATE: 78 BPM | SYSTOLIC BLOOD PRESSURE: 140 MMHG | OXYGEN SATURATION: 99 % | HEIGHT: 62 IN

## 2021-04-22 DIAGNOSIS — R94.6 ABNORMAL THYROID FUNCTION TEST: ICD-10-CM

## 2021-04-22 DIAGNOSIS — S22.080D COMPRESSION FRACTURE OF T12 VERTEBRA WITH ROUTINE HEALING: ICD-10-CM

## 2021-04-22 DIAGNOSIS — E05.90 HYPERTHYROIDISM: ICD-10-CM

## 2021-04-22 DIAGNOSIS — R79.89 ABNORMAL TSH: ICD-10-CM

## 2021-04-22 DIAGNOSIS — M81.0 POST-MENOPAUSAL OSTEOPOROSIS: Primary | ICD-10-CM

## 2021-04-22 DIAGNOSIS — M81.0 OSTEOPOROSIS, POST-MENOPAUSAL: ICD-10-CM

## 2021-04-22 PROBLEM — Z79.899 HIGH RISK MEDICATION USE: Status: RESOLVED | Noted: 2020-04-15 | Resolved: 2021-04-22

## 2021-04-22 PROBLEM — N18.30 STAGE 3 CHRONIC KIDNEY DISEASE: Status: RESOLVED | Noted: 2020-07-14 | Resolved: 2021-04-22

## 2021-04-22 PROBLEM — R55 SYNCOPE: Status: RESOLVED | Noted: 2018-01-08 | Resolved: 2021-04-22

## 2021-04-22 PROCEDURE — 1159F MED LIST DOCD IN RCRD: CPT | Mod: S$GLB,,, | Performed by: INTERNAL MEDICINE

## 2021-04-22 PROCEDURE — 99999 PR PBB SHADOW E&M-EST. PATIENT-LVL III: ICD-10-PCS | Mod: PBBFAC,,, | Performed by: INTERNAL MEDICINE

## 2021-04-22 PROCEDURE — 1159F PR MEDICATION LIST DOCUMENTED IN MEDICAL RECORD: ICD-10-PCS | Mod: S$GLB,,, | Performed by: INTERNAL MEDICINE

## 2021-04-22 PROCEDURE — 1101F PR PT FALLS ASSESS DOC 0-1 FALLS W/OUT INJ PAST YR: ICD-10-PCS | Mod: CPTII,S$GLB,, | Performed by: INTERNAL MEDICINE

## 2021-04-22 PROCEDURE — 3288F FALL RISK ASSESSMENT DOCD: CPT | Mod: CPTII,S$GLB,, | Performed by: INTERNAL MEDICINE

## 2021-04-22 PROCEDURE — 99204 PR OFFICE/OUTPT VISIT, NEW, LEVL IV, 45-59 MIN: ICD-10-PCS | Mod: S$GLB,,, | Performed by: INTERNAL MEDICINE

## 2021-04-22 PROCEDURE — 1101F PT FALLS ASSESS-DOCD LE1/YR: CPT | Mod: CPTII,S$GLB,, | Performed by: INTERNAL MEDICINE

## 2021-04-22 PROCEDURE — 99204 OFFICE O/P NEW MOD 45 MIN: CPT | Mod: S$GLB,,, | Performed by: INTERNAL MEDICINE

## 2021-04-22 PROCEDURE — 1126F AMNT PAIN NOTED NONE PRSNT: CPT | Mod: S$GLB,,, | Performed by: INTERNAL MEDICINE

## 2021-04-22 PROCEDURE — 99999 PR PBB SHADOW E&M-EST. PATIENT-LVL III: CPT | Mod: PBBFAC,,, | Performed by: INTERNAL MEDICINE

## 2021-04-22 PROCEDURE — 3288F PR FALLS RISK ASSESSMENT DOCUMENTED: ICD-10-PCS | Mod: CPTII,S$GLB,, | Performed by: INTERNAL MEDICINE

## 2021-04-22 PROCEDURE — 1126F PR PAIN SEVERITY QUANTIFIED, NO PAIN PRESENT: ICD-10-PCS | Mod: S$GLB,,, | Performed by: INTERNAL MEDICINE

## 2021-04-22 RX ORDER — ALENDRONATE SODIUM 70 MG/1
70 TABLET ORAL
Qty: 12 TABLET | Refills: 4 | Status: SHIPPED | OUTPATIENT
Start: 2021-04-22 | End: 2021-05-19 | Stop reason: ALTCHOICE

## 2021-04-23 ENCOUNTER — TELEPHONE (OUTPATIENT)
Dept: INTERNAL MEDICINE | Facility: CLINIC | Age: 86
End: 2021-04-23

## 2021-04-26 ENCOUNTER — PATIENT MESSAGE (OUTPATIENT)
Dept: ENDOCRINOLOGY | Facility: CLINIC | Age: 86
End: 2021-04-26

## 2021-04-26 ENCOUNTER — LAB VISIT (OUTPATIENT)
Dept: LAB | Facility: HOSPITAL | Age: 86
End: 2021-04-26
Attending: INTERNAL MEDICINE
Payer: MEDICARE

## 2021-04-26 DIAGNOSIS — E05.90 HYPERTHYROIDISM: ICD-10-CM

## 2021-04-26 DIAGNOSIS — R79.89 ABNORMAL TSH: ICD-10-CM

## 2021-04-26 LAB
T3FREE SERPL-MCNC: 2.4 PG/ML (ref 2.3–4.2)
T4 FREE SERPL-MCNC: 0.95 NG/DL (ref 0.71–1.51)
THYROPEROXIDASE IGG SERPL-ACNC: <6 IU/ML
TSH SERPL DL<=0.005 MIU/L-ACNC: 0.04 UIU/ML (ref 0.4–4)

## 2021-04-26 PROCEDURE — 84443 ASSAY THYROID STIM HORMONE: CPT | Performed by: INTERNAL MEDICINE

## 2021-04-26 PROCEDURE — 84439 ASSAY OF FREE THYROXINE: CPT | Performed by: INTERNAL MEDICINE

## 2021-04-26 PROCEDURE — 36415 COLL VENOUS BLD VENIPUNCTURE: CPT | Performed by: INTERNAL MEDICINE

## 2021-04-26 PROCEDURE — 84481 FREE ASSAY (FT-3): CPT | Performed by: INTERNAL MEDICINE

## 2021-04-26 PROCEDURE — 83520 IMMUNOASSAY QUANT NOS NONAB: CPT | Performed by: INTERNAL MEDICINE

## 2021-04-26 PROCEDURE — 86376 MICROSOMAL ANTIBODY EACH: CPT | Performed by: INTERNAL MEDICINE

## 2021-04-28 ENCOUNTER — HOSPITAL ENCOUNTER (OUTPATIENT)
Dept: RADIOLOGY | Facility: CLINIC | Age: 86
Discharge: HOME | End: 2021-04-28
Attending: INTERNAL MEDICINE
Payer: MEDICARE

## 2021-04-28 DIAGNOSIS — M81.0 POST-MENOPAUSAL OSTEOPOROSIS: ICD-10-CM

## 2021-04-28 LAB — TSH RECEP AB SER-ACNC: 2.82 IU/L (ref 0–1.75)

## 2021-04-28 PROCEDURE — 77080 DXA BONE DENSITY AXIAL: CPT | Mod: TC

## 2021-04-28 PROCEDURE — 77080 DEXA BONE DENSITY SPINE HIP: ICD-10-PCS | Mod: 26,,, | Performed by: INTERNAL MEDICINE

## 2021-04-28 PROCEDURE — 77080 DXA BONE DENSITY AXIAL: CPT | Mod: 26,,, | Performed by: INTERNAL MEDICINE

## 2021-04-29 ENCOUNTER — TELEPHONE (OUTPATIENT)
Dept: ENDOCRINOLOGY | Facility: CLINIC | Age: 86
End: 2021-04-29

## 2021-04-29 ENCOUNTER — PATIENT MESSAGE (OUTPATIENT)
Dept: ENDOCRINOLOGY | Facility: CLINIC | Age: 86
End: 2021-04-29

## 2021-04-29 DIAGNOSIS — R79.89 ABNORMAL TSH: ICD-10-CM

## 2021-04-29 DIAGNOSIS — E05.00 GRAVES DISEASE: Primary | ICD-10-CM

## 2021-04-29 RX ORDER — METHIMAZOLE 5 MG/1
5 TABLET ORAL DAILY
Qty: 90 TABLET | Refills: 3 | Status: SHIPPED | OUTPATIENT
Start: 2021-04-29 | End: 2021-09-04 | Stop reason: SDUPTHER

## 2021-05-03 ENCOUNTER — OFFICE VISIT (OUTPATIENT)
Dept: INTERNAL MEDICINE | Facility: CLINIC | Age: 86
End: 2021-05-03
Payer: MEDICARE

## 2021-05-03 VITALS
HEART RATE: 75 BPM | RESPIRATION RATE: 16 BRPM | DIASTOLIC BLOOD PRESSURE: 68 MMHG | WEIGHT: 123 LBS | SYSTOLIC BLOOD PRESSURE: 132 MMHG | HEIGHT: 62 IN | BODY MASS INDEX: 22.63 KG/M2

## 2021-05-03 DIAGNOSIS — S22.080S COMPRESSION FRACTURE OF T12 VERTEBRA, SEQUELA: ICD-10-CM

## 2021-05-03 DIAGNOSIS — M48.061 SPINAL STENOSIS OF LUMBAR REGION, UNSPECIFIED WHETHER NEUROGENIC CLAUDICATION PRESENT: ICD-10-CM

## 2021-05-03 DIAGNOSIS — D69.2 SENILE PURPURA: ICD-10-CM

## 2021-05-03 DIAGNOSIS — I77.9 BILATERAL CAROTID ARTERY DISEASE, UNSPECIFIED TYPE: ICD-10-CM

## 2021-05-03 DIAGNOSIS — I70.0 ATHEROSCLEROSIS OF AORTA: ICD-10-CM

## 2021-05-03 DIAGNOSIS — R09.89 CAROTID BRUIT, UNSPECIFIED LATERALITY: ICD-10-CM

## 2021-05-03 DIAGNOSIS — E05.00 GRAVES DISEASE: ICD-10-CM

## 2021-05-03 DIAGNOSIS — I10 ESSENTIAL HYPERTENSION: ICD-10-CM

## 2021-05-03 DIAGNOSIS — E07.9 THYROID DISORDER: ICD-10-CM

## 2021-05-03 DIAGNOSIS — F41.0 PANIC ATTACKS: ICD-10-CM

## 2021-05-03 DIAGNOSIS — M81.0 OSTEOPOROSIS, POST-MENOPAUSAL: ICD-10-CM

## 2021-05-03 DIAGNOSIS — Z00.00 ENCOUNTER FOR PREVENTIVE HEALTH EXAMINATION: Primary | ICD-10-CM

## 2021-05-03 DIAGNOSIS — F41.1 GENERALIZED ANXIETY DISORDER: ICD-10-CM

## 2021-05-03 DIAGNOSIS — E78.5 HYPERLIPIDEMIA, UNSPECIFIED HYPERLIPIDEMIA TYPE: ICD-10-CM

## 2021-05-03 DIAGNOSIS — G60.9 IDIOPATHIC PERIPHERAL NEUROPATHY: ICD-10-CM

## 2021-05-03 PROCEDURE — 3288F FALL RISK ASSESSMENT DOCD: CPT | Mod: CPTII,S$GLB,, | Performed by: NURSE PRACTITIONER

## 2021-05-03 PROCEDURE — 99499 UNLISTED E&M SERVICE: CPT | Mod: HCNC,S$GLB,, | Performed by: NURSE PRACTITIONER

## 2021-05-03 PROCEDURE — G0439 PPPS, SUBSEQ VISIT: HCPCS | Mod: S$GLB,,, | Performed by: NURSE PRACTITIONER

## 2021-05-03 PROCEDURE — G0439 PR MEDICARE ANNUAL WELLNESS SUBSEQUENT VISIT: ICD-10-PCS | Mod: S$GLB,,, | Performed by: NURSE PRACTITIONER

## 2021-05-03 PROCEDURE — 1101F PT FALLS ASSESS-DOCD LE1/YR: CPT | Mod: CPTII,S$GLB,, | Performed by: NURSE PRACTITIONER

## 2021-05-03 PROCEDURE — 1126F PR PAIN SEVERITY QUANTIFIED, NO PAIN PRESENT: ICD-10-PCS | Mod: S$GLB,,, | Performed by: NURSE PRACTITIONER

## 2021-05-03 PROCEDURE — 1126F AMNT PAIN NOTED NONE PRSNT: CPT | Mod: S$GLB,,, | Performed by: NURSE PRACTITIONER

## 2021-05-03 PROCEDURE — 1158F PR ADVANCE CARE PLANNING DISCUSS DOCUMENTED IN MEDICAL RECORD: ICD-10-PCS | Mod: S$GLB,,, | Performed by: NURSE PRACTITIONER

## 2021-05-03 PROCEDURE — 99999 PR PBB SHADOW E&M-EST. PATIENT-LVL III: CPT | Mod: PBBFAC,,, | Performed by: NURSE PRACTITIONER

## 2021-05-03 PROCEDURE — 3288F PR FALLS RISK ASSESSMENT DOCUMENTED: ICD-10-PCS | Mod: CPTII,S$GLB,, | Performed by: NURSE PRACTITIONER

## 2021-05-03 PROCEDURE — 99999 PR PBB SHADOW E&M-EST. PATIENT-LVL III: ICD-10-PCS | Mod: PBBFAC,,, | Performed by: NURSE PRACTITIONER

## 2021-05-03 PROCEDURE — 1158F ADVNC CARE PLAN TLK DOCD: CPT | Mod: S$GLB,,, | Performed by: NURSE PRACTITIONER

## 2021-05-03 PROCEDURE — 1101F PR PT FALLS ASSESS DOC 0-1 FALLS W/OUT INJ PAST YR: ICD-10-PCS | Mod: CPTII,S$GLB,, | Performed by: NURSE PRACTITIONER

## 2021-05-03 PROCEDURE — 99499 RISK ADDL DX/OHS AUDIT: ICD-10-PCS | Mod: HCNC,S$GLB,, | Performed by: NURSE PRACTITIONER

## 2021-05-04 ENCOUNTER — PATIENT MESSAGE (OUTPATIENT)
Dept: ENDOCRINOLOGY | Facility: CLINIC | Age: 86
End: 2021-05-04

## 2021-05-04 ENCOUNTER — TELEPHONE (OUTPATIENT)
Dept: ENDOCRINOLOGY | Facility: CLINIC | Age: 86
End: 2021-05-04

## 2021-05-05 ENCOUNTER — PATIENT MESSAGE (OUTPATIENT)
Dept: INTERNAL MEDICINE | Facility: CLINIC | Age: 86
End: 2021-05-05

## 2021-05-05 ENCOUNTER — TELEPHONE (OUTPATIENT)
Dept: INTERNAL MEDICINE | Facility: CLINIC | Age: 86
End: 2021-05-05

## 2021-05-05 DIAGNOSIS — N18.31 STAGE 3A CHRONIC KIDNEY DISEASE: ICD-10-CM

## 2021-05-05 DIAGNOSIS — M81.0 OSTEOPOROSIS, POST-MENOPAUSAL: Primary | ICD-10-CM

## 2021-05-05 DIAGNOSIS — N20.0 NEPHROLITHIASIS: ICD-10-CM

## 2021-05-06 ENCOUNTER — PATIENT MESSAGE (OUTPATIENT)
Dept: INTERNAL MEDICINE | Facility: CLINIC | Age: 86
End: 2021-05-06

## 2021-05-17 ENCOUNTER — PATIENT MESSAGE (OUTPATIENT)
Dept: ENDOCRINOLOGY | Facility: CLINIC | Age: 86
End: 2021-05-17

## 2021-05-19 ENCOUNTER — OFFICE VISIT (OUTPATIENT)
Dept: INTERNAL MEDICINE | Facility: CLINIC | Age: 86
End: 2021-05-19
Payer: MEDICARE

## 2021-05-19 VITALS
HEART RATE: 85 BPM | WEIGHT: 122.81 LBS | SYSTOLIC BLOOD PRESSURE: 144 MMHG | OXYGEN SATURATION: 97 % | DIASTOLIC BLOOD PRESSURE: 72 MMHG | BODY MASS INDEX: 22.6 KG/M2 | HEIGHT: 62 IN

## 2021-05-19 DIAGNOSIS — F41.1 GENERALIZED ANXIETY DISORDER: ICD-10-CM

## 2021-05-19 DIAGNOSIS — L29.9 SEVERE ITCHING: Primary | ICD-10-CM

## 2021-05-19 DIAGNOSIS — E05.00 GRAVES DISEASE: ICD-10-CM

## 2021-05-19 DIAGNOSIS — I10 ESSENTIAL HYPERTENSION: ICD-10-CM

## 2021-05-19 DIAGNOSIS — M81.0 OSTEOPOROSIS, POST-MENOPAUSAL: ICD-10-CM

## 2021-05-19 PROCEDURE — 1159F PR MEDICATION LIST DOCUMENTED IN MEDICAL RECORD: ICD-10-PCS | Mod: S$GLB,,, | Performed by: INTERNAL MEDICINE

## 2021-05-19 PROCEDURE — 99999 PR PBB SHADOW E&M-EST. PATIENT-LVL III: ICD-10-PCS | Mod: PBBFAC,,, | Performed by: INTERNAL MEDICINE

## 2021-05-19 PROCEDURE — 99999 PR PBB SHADOW E&M-EST. PATIENT-LVL III: CPT | Mod: PBBFAC,,, | Performed by: INTERNAL MEDICINE

## 2021-05-19 PROCEDURE — 1126F PR PAIN SEVERITY QUANTIFIED, NO PAIN PRESENT: ICD-10-PCS | Mod: S$GLB,,, | Performed by: INTERNAL MEDICINE

## 2021-05-19 PROCEDURE — 99213 PR OFFICE/OUTPT VISIT, EST, LEVL III, 20-29 MIN: ICD-10-PCS | Mod: S$GLB,,, | Performed by: INTERNAL MEDICINE

## 2021-05-19 PROCEDURE — 99213 OFFICE O/P EST LOW 20 MIN: CPT | Mod: S$GLB,,, | Performed by: INTERNAL MEDICINE

## 2021-05-19 PROCEDURE — 1159F MED LIST DOCD IN RCRD: CPT | Mod: S$GLB,,, | Performed by: INTERNAL MEDICINE

## 2021-05-19 PROCEDURE — 3288F FALL RISK ASSESSMENT DOCD: CPT | Mod: CPTII,S$GLB,, | Performed by: INTERNAL MEDICINE

## 2021-05-19 PROCEDURE — 1101F PR PT FALLS ASSESS DOC 0-1 FALLS W/OUT INJ PAST YR: ICD-10-PCS | Mod: CPTII,S$GLB,, | Performed by: INTERNAL MEDICINE

## 2021-05-19 PROCEDURE — 1126F AMNT PAIN NOTED NONE PRSNT: CPT | Mod: S$GLB,,, | Performed by: INTERNAL MEDICINE

## 2021-05-19 PROCEDURE — 3288F PR FALLS RISK ASSESSMENT DOCUMENTED: ICD-10-PCS | Mod: CPTII,S$GLB,, | Performed by: INTERNAL MEDICINE

## 2021-05-19 PROCEDURE — 1101F PT FALLS ASSESS-DOCD LE1/YR: CPT | Mod: CPTII,S$GLB,, | Performed by: INTERNAL MEDICINE

## 2021-05-19 RX ORDER — TRIAMTERENE AND HYDROCHLOROTHIAZIDE 37.5; 25 MG/1; MG/1
1 CAPSULE ORAL EVERY MORNING
Qty: 90 CAPSULE | Refills: 3 | Status: SHIPPED | OUTPATIENT
Start: 2021-05-19 | End: 2021-09-04 | Stop reason: SDUPTHER

## 2021-05-21 ENCOUNTER — INFUSION (OUTPATIENT)
Dept: INFECTIOUS DISEASES | Facility: HOSPITAL | Age: 86
End: 2021-05-21
Attending: INTERNAL MEDICINE
Payer: MEDICARE

## 2021-05-21 VITALS
DIASTOLIC BLOOD PRESSURE: 71 MMHG | HEIGHT: 62 IN | OXYGEN SATURATION: 96 % | HEART RATE: 82 BPM | BODY MASS INDEX: 22.62 KG/M2 | WEIGHT: 122.94 LBS | SYSTOLIC BLOOD PRESSURE: 141 MMHG | RESPIRATION RATE: 16 BRPM | TEMPERATURE: 98 F

## 2021-05-21 DIAGNOSIS — S22.080D COMPRESSION FRACTURE OF T12 VERTEBRA WITH ROUTINE HEALING: Primary | ICD-10-CM

## 2021-05-21 DIAGNOSIS — M81.0 OSTEOPOROSIS, POST-MENOPAUSAL: ICD-10-CM

## 2021-05-21 PROCEDURE — 63600175 PHARM REV CODE 636 W HCPCS: Mod: TB | Performed by: INTERNAL MEDICINE

## 2021-05-21 PROCEDURE — 96372 THER/PROPH/DIAG INJ SC/IM: CPT

## 2021-05-21 RX ADMIN — ROMOSOZUMAB-AQQG 210 MG: 105 INJECTION, SOLUTION SUBCUTANEOUS at 10:05

## 2021-05-24 ENCOUNTER — PATIENT MESSAGE (OUTPATIENT)
Dept: INTERNAL MEDICINE | Facility: CLINIC | Age: 86
End: 2021-05-24

## 2021-05-24 DIAGNOSIS — L29.9 ITCHING: ICD-10-CM

## 2021-05-24 DIAGNOSIS — E05.00 GRAVES DISEASE: Primary | ICD-10-CM

## 2021-05-26 PROBLEM — L29.9 ITCHING: Status: ACTIVE | Noted: 2021-05-26

## 2021-06-08 ENCOUNTER — OFFICE VISIT (OUTPATIENT)
Dept: ALLERGY | Facility: CLINIC | Age: 86
End: 2021-06-08
Payer: MEDICARE

## 2021-06-08 ENCOUNTER — PATIENT MESSAGE (OUTPATIENT)
Dept: ENDOCRINOLOGY | Facility: CLINIC | Age: 86
End: 2021-06-08

## 2021-06-08 VITALS — BODY MASS INDEX: 23.14 KG/M2 | HEIGHT: 62 IN | WEIGHT: 125.75 LBS

## 2021-06-08 DIAGNOSIS — E05.00 GRAVES DISEASE: ICD-10-CM

## 2021-06-08 DIAGNOSIS — F45.8 PRURITUS SINE MATERIA: Primary | ICD-10-CM

## 2021-06-08 PROCEDURE — 99204 PR OFFICE/OUTPT VISIT, NEW, LEVL IV, 45-59 MIN: ICD-10-PCS | Mod: S$GLB,,, | Performed by: ALLERGY & IMMUNOLOGY

## 2021-06-08 PROCEDURE — 1159F MED LIST DOCD IN RCRD: CPT | Mod: S$GLB,,, | Performed by: ALLERGY & IMMUNOLOGY

## 2021-06-08 PROCEDURE — 99204 OFFICE O/P NEW MOD 45 MIN: CPT | Mod: S$GLB,,, | Performed by: ALLERGY & IMMUNOLOGY

## 2021-06-08 PROCEDURE — 99999 PR PBB SHADOW E&M-EST. PATIENT-LVL III: CPT | Mod: PBBFAC,,, | Performed by: ALLERGY & IMMUNOLOGY

## 2021-06-08 PROCEDURE — 1126F PR PAIN SEVERITY QUANTIFIED, NO PAIN PRESENT: ICD-10-PCS | Mod: S$GLB,,, | Performed by: ALLERGY & IMMUNOLOGY

## 2021-06-08 PROCEDURE — 99999 PR PBB SHADOW E&M-EST. PATIENT-LVL III: ICD-10-PCS | Mod: PBBFAC,,, | Performed by: ALLERGY & IMMUNOLOGY

## 2021-06-08 PROCEDURE — 1159F PR MEDICATION LIST DOCUMENTED IN MEDICAL RECORD: ICD-10-PCS | Mod: S$GLB,,, | Performed by: ALLERGY & IMMUNOLOGY

## 2021-06-08 PROCEDURE — 1126F AMNT PAIN NOTED NONE PRSNT: CPT | Mod: S$GLB,,, | Performed by: ALLERGY & IMMUNOLOGY

## 2021-06-08 RX ORDER — CETIRIZINE HYDROCHLORIDE 10 MG/1
TABLET, ORALLY DISINTEGRATING ORAL
COMMUNITY
End: 2022-08-30

## 2021-06-08 RX ORDER — EMOLLIENT COMBINATION NO.32
EMULSION, EXTENDED RELEASE TOPICAL
COMMUNITY
Start: 2021-04-02 | End: 2023-03-27

## 2021-06-08 RX ORDER — TERIPARATIDE 250 UG/ML
20 INJECTION, SOLUTION SUBCUTANEOUS
COMMUNITY
Start: 2020-07-07 | End: 2021-06-22 | Stop reason: ALTCHOICE

## 2021-06-08 RX ORDER — TRIAMCINOLONE ACETONIDE 1 MG/G
CREAM TOPICAL
COMMUNITY
Start: 2021-05-05 | End: 2022-08-30

## 2021-06-08 RX ORDER — CALCIUM CARB/MAGNESIUM CARB 311-232MG
TABLET ORAL
COMMUNITY

## 2021-06-10 ENCOUNTER — LAB VISIT (OUTPATIENT)
Dept: LAB | Facility: HOSPITAL | Age: 86
End: 2021-06-10
Attending: INTERNAL MEDICINE
Payer: MEDICARE

## 2021-06-10 ENCOUNTER — PATIENT MESSAGE (OUTPATIENT)
Dept: ENDOCRINOLOGY | Facility: CLINIC | Age: 86
End: 2021-06-10

## 2021-06-10 ENCOUNTER — TELEPHONE (OUTPATIENT)
Dept: ENDOCRINOLOGY | Facility: CLINIC | Age: 86
End: 2021-06-10

## 2021-06-10 DIAGNOSIS — E05.00 GRAVES DISEASE: ICD-10-CM

## 2021-06-10 DIAGNOSIS — N20.0 NEPHROLITHIASIS: ICD-10-CM

## 2021-06-10 DIAGNOSIS — E05.00 GRAVES DISEASE: Primary | ICD-10-CM

## 2021-06-10 DIAGNOSIS — M81.0 OSTEOPOROSIS, POST-MENOPAUSAL: ICD-10-CM

## 2021-06-10 DIAGNOSIS — N18.31 STAGE 3A CHRONIC KIDNEY DISEASE: ICD-10-CM

## 2021-06-10 LAB
25(OH)D3+25(OH)D2 SERPL-MCNC: 31 NG/ML (ref 30–96)
CA-I BLDV-SCNC: 1.19 MMOL/L (ref 1.06–1.42)
PTH-INTACT SERPL-MCNC: 85 PG/ML (ref 9–77)
T4 FREE SERPL-MCNC: 0.84 NG/DL (ref 0.71–1.51)
TSH SERPL DL<=0.005 MIU/L-ACNC: 0.57 UIU/ML (ref 0.4–4)
URATE SERPL-MCNC: 4.7 MG/DL (ref 2.4–5.7)

## 2021-06-10 PROCEDURE — 84443 ASSAY THYROID STIM HORMONE: CPT | Performed by: INTERNAL MEDICINE

## 2021-06-10 PROCEDURE — 84439 ASSAY OF FREE THYROXINE: CPT | Performed by: INTERNAL MEDICINE

## 2021-06-10 PROCEDURE — 82306 VITAMIN D 25 HYDROXY: CPT | Performed by: INTERNAL MEDICINE

## 2021-06-10 PROCEDURE — 82330 ASSAY OF CALCIUM: CPT | Performed by: INTERNAL MEDICINE

## 2021-06-10 PROCEDURE — 84550 ASSAY OF BLOOD/URIC ACID: CPT | Performed by: INTERNAL MEDICINE

## 2021-06-10 PROCEDURE — 83970 ASSAY OF PARATHORMONE: CPT | Performed by: INTERNAL MEDICINE

## 2021-06-21 ENCOUNTER — PATIENT MESSAGE (OUTPATIENT)
Dept: ENDOCRINOLOGY | Facility: CLINIC | Age: 86
End: 2021-06-21

## 2021-06-22 ENCOUNTER — TELEPHONE (OUTPATIENT)
Dept: ENDOCRINOLOGY | Facility: CLINIC | Age: 86
End: 2021-06-22

## 2021-06-22 DIAGNOSIS — M81.0 POST-MENOPAUSAL OSTEOPOROSIS: Primary | ICD-10-CM

## 2021-06-22 RX ORDER — ALENDRONATE SODIUM 70 MG/1
70 TABLET ORAL
Qty: 12 TABLET | Refills: 4 | Status: SHIPPED | OUTPATIENT
Start: 2021-06-22 | End: 2021-09-04 | Stop reason: SDUPTHER

## 2021-06-24 ENCOUNTER — PATIENT MESSAGE (OUTPATIENT)
Dept: ENDOCRINOLOGY | Facility: CLINIC | Age: 86
End: 2021-06-24

## 2021-06-25 ENCOUNTER — TELEPHONE (OUTPATIENT)
Dept: PHARMACY | Facility: CLINIC | Age: 86
End: 2021-06-25

## 2021-06-25 ENCOUNTER — TELEPHONE (OUTPATIENT)
Dept: ENDOCRINOLOGY | Facility: CLINIC | Age: 86
End: 2021-06-25

## 2021-06-25 DIAGNOSIS — M81.0 POST-MENOPAUSAL OSTEOPOROSIS: Primary | ICD-10-CM

## 2021-06-25 DIAGNOSIS — S22.080D COMPRESSION FRACTURE OF T12 VERTEBRA WITH ROUTINE HEALING: ICD-10-CM

## 2021-06-28 ENCOUNTER — SPECIALTY PHARMACY (OUTPATIENT)
Dept: PHARMACY | Facility: CLINIC | Age: 86
End: 2021-06-28

## 2021-06-30 ENCOUNTER — TELEPHONE (OUTPATIENT)
Dept: INFECTIOUS DISEASES | Facility: HOSPITAL | Age: 86
End: 2021-06-30

## 2021-07-26 ENCOUNTER — PATIENT MESSAGE (OUTPATIENT)
Dept: ALLERGY | Facility: CLINIC | Age: 86
End: 2021-07-26

## 2021-08-09 PROBLEM — Z91.81 AT RISK FOR FALLING: Status: ACTIVE | Noted: 2021-08-09

## 2021-08-11 ENCOUNTER — PATIENT MESSAGE (OUTPATIENT)
Dept: ADMINISTRATIVE | Facility: OTHER | Age: 86
End: 2021-08-11

## 2021-08-11 ENCOUNTER — PATIENT MESSAGE (OUTPATIENT)
Dept: INTERNAL MEDICINE | Facility: CLINIC | Age: 86
End: 2021-08-11

## 2021-08-12 ENCOUNTER — PATIENT MESSAGE (OUTPATIENT)
Dept: ADMINISTRATIVE | Facility: OTHER | Age: 86
End: 2021-08-12

## 2021-08-13 ENCOUNTER — PATIENT MESSAGE (OUTPATIENT)
Dept: ADMINISTRATIVE | Facility: OTHER | Age: 86
End: 2021-08-13

## 2021-08-15 ENCOUNTER — PATIENT MESSAGE (OUTPATIENT)
Dept: ADMINISTRATIVE | Facility: OTHER | Age: 86
End: 2021-08-15

## 2021-08-16 ENCOUNTER — PATIENT MESSAGE (OUTPATIENT)
Dept: ADMINISTRATIVE | Facility: OTHER | Age: 86
End: 2021-08-16

## 2021-08-16 ENCOUNTER — LAB VISIT (OUTPATIENT)
Dept: LAB | Facility: HOSPITAL | Age: 86
End: 2021-08-16
Attending: INTERNAL MEDICINE
Payer: MEDICARE

## 2021-08-16 DIAGNOSIS — E05.00 GRAVES DISEASE: ICD-10-CM

## 2021-08-16 LAB
T4 FREE SERPL-MCNC: 0.8 NG/DL (ref 0.71–1.51)
TSH SERPL DL<=0.005 MIU/L-ACNC: 1.86 UIU/ML (ref 0.4–4)

## 2021-08-16 PROCEDURE — 36415 COLL VENOUS BLD VENIPUNCTURE: CPT | Performed by: INTERNAL MEDICINE

## 2021-08-16 PROCEDURE — 84439 ASSAY OF FREE THYROXINE: CPT | Performed by: INTERNAL MEDICINE

## 2021-08-16 PROCEDURE — 84443 ASSAY THYROID STIM HORMONE: CPT | Performed by: INTERNAL MEDICINE

## 2021-08-17 ENCOUNTER — PATIENT MESSAGE (OUTPATIENT)
Dept: ENDOCRINOLOGY | Facility: CLINIC | Age: 86
End: 2021-08-17

## 2021-08-17 ENCOUNTER — PATIENT MESSAGE (OUTPATIENT)
Dept: ADMINISTRATIVE | Facility: OTHER | Age: 86
End: 2021-08-17

## 2021-08-18 ENCOUNTER — PATIENT MESSAGE (OUTPATIENT)
Dept: ADMINISTRATIVE | Facility: OTHER | Age: 86
End: 2021-08-18

## 2021-08-22 ENCOUNTER — PATIENT MESSAGE (OUTPATIENT)
Dept: ADMINISTRATIVE | Facility: OTHER | Age: 86
End: 2021-08-22

## 2021-08-25 ENCOUNTER — PATIENT MESSAGE (OUTPATIENT)
Dept: ADMINISTRATIVE | Facility: OTHER | Age: 86
End: 2021-08-25

## 2021-09-04 ENCOUNTER — PATIENT MESSAGE (OUTPATIENT)
Dept: INTERNAL MEDICINE | Facility: CLINIC | Age: 86
End: 2021-09-04

## 2021-09-04 DIAGNOSIS — F41.1 GENERALIZED ANXIETY DISORDER: ICD-10-CM

## 2021-09-04 DIAGNOSIS — E05.00 GRAVES DISEASE: ICD-10-CM

## 2021-09-04 DIAGNOSIS — M81.0 POST-MENOPAUSAL OSTEOPOROSIS: ICD-10-CM

## 2021-09-04 RX ORDER — PAROXETINE HYDROCHLORIDE 20 MG/1
20 TABLET, FILM COATED ORAL DAILY
Qty: 90 TABLET | Refills: 0 | Status: SHIPPED | OUTPATIENT
Start: 2021-09-04 | End: 2021-11-06 | Stop reason: SDUPTHER

## 2021-09-04 RX ORDER — METHIMAZOLE 5 MG/1
5 TABLET ORAL DAILY
Qty: 90 TABLET | Refills: 0 | Status: SHIPPED | OUTPATIENT
Start: 2021-09-04 | End: 2022-02-22

## 2021-09-04 RX ORDER — TRIAMTERENE AND HYDROCHLOROTHIAZIDE 37.5; 25 MG/1; MG/1
1 CAPSULE ORAL EVERY MORNING
Qty: 90 CAPSULE | Refills: 0 | Status: SHIPPED | OUTPATIENT
Start: 2021-09-04 | End: 2022-02-23 | Stop reason: SDUPTHER

## 2021-09-04 RX ORDER — ALENDRONATE SODIUM 70 MG/1
70 TABLET ORAL
Qty: 4 TABLET | Refills: 0 | Status: SHIPPED | OUTPATIENT
Start: 2021-09-04 | End: 2022-06-27 | Stop reason: SDUPTHER

## 2021-09-25 ENCOUNTER — PATIENT MESSAGE (OUTPATIENT)
Dept: ADMINISTRATIVE | Facility: OTHER | Age: 86
End: 2021-09-25

## 2021-10-07 ENCOUNTER — PATIENT MESSAGE (OUTPATIENT)
Dept: ADMINISTRATIVE | Facility: OTHER | Age: 86
End: 2021-10-07

## 2021-10-07 ENCOUNTER — HOSPITAL ENCOUNTER (EMERGENCY)
Facility: HOSPITAL | Age: 86
Discharge: HOME | End: 2021-10-07
Attending: EMERGENCY MEDICINE
Payer: MEDICARE

## 2021-10-07 ENCOUNTER — NURSE TRIAGE (OUTPATIENT)
Dept: ADMINISTRATIVE | Facility: CLINIC | Age: 86
End: 2021-10-07

## 2021-10-07 VITALS
BODY MASS INDEX: 22.63 KG/M2 | RESPIRATION RATE: 16 BRPM | OXYGEN SATURATION: 99 % | SYSTOLIC BLOOD PRESSURE: 151 MMHG | WEIGHT: 123 LBS | HEIGHT: 62 IN | TEMPERATURE: 99 F | DIASTOLIC BLOOD PRESSURE: 69 MMHG | HEART RATE: 85 BPM

## 2021-10-07 DIAGNOSIS — S32.009A CLOSED FRACTURE OF TRANSVERSE PROCESS OF LUMBAR VERTEBRA, INITIAL ENCOUNTER: ICD-10-CM

## 2021-10-07 DIAGNOSIS — S30.1XXA TRAUMATIC ECCHYMOSIS OF FLANK, INITIAL ENCOUNTER: ICD-10-CM
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
## 2021-10-07 DIAGNOSIS — S22.42XA CLOSED FRACTURE OF MULTIPLE RIBS OF LEFT SIDE, INITIAL ENCOUNTER: ICD-10-CM

## 2021-10-07 LAB
BASOPHILS # BLD AUTO: 0.02 K/UL (ref 0–0.2)
BASOPHILS NFR BLD: 0.3 % (ref 0–1.9)
BUN SERPL-MCNC: 17 MG/DL (ref 6–30)
CHLORIDE SERPL-SCNC: 93 MMOL/L (ref 95–110)
CREAT SERPL-MCNC: 0.8 MG/DL (ref 0.5–1.4)
DIFFERENTIAL METHOD: ABNORMAL
EOSINOPHIL # BLD AUTO: 0.1 K/UL (ref 0–0.5)
EOSINOPHIL NFR BLD: 1.8 % (ref 0–8)
ERYTHROCYTE [DISTWIDTH] IN BLOOD BY AUTOMATED COUNT: 16.5 % (ref 11.5–14.5)
GLUCOSE SERPL-MCNC: 100 MG/DL (ref 70–110)
HCT VFR BLD AUTO: 36.6 % (ref 37–48.5)
HCT VFR BLD CALC: 37 %PCV (ref 36–54)
HGB BLD-MCNC: 12.1 G/DL (ref 12–16)
IMM GRANULOCYTES # BLD AUTO: 0.03 K/UL (ref 0–0.04)
IMM GRANULOCYTES NFR BLD AUTO: 0.5 % (ref 0–0.5)
LYMPHOCYTES # BLD AUTO: 1.6 K/UL (ref 1–4.8)
LYMPHOCYTES NFR BLD: 23.7 % (ref 18–48)
MCH RBC QN AUTO: 28.3 PG (ref 27–31)
MCHC RBC AUTO-ENTMCNC: 33.1 G/DL (ref 32–36)
MCV RBC AUTO: 86 FL (ref 82–98)
MONOCYTES # BLD AUTO: 0.9 K/UL (ref 0.3–1)
MONOCYTES NFR BLD: 14.3 % (ref 4–15)
NEUTROPHILS # BLD AUTO: 3.9 K/UL (ref 1.8–7.7)
NEUTROPHILS NFR BLD: 59.4 % (ref 38–73)
NRBC BLD-RTO: 0 /100 WBC
PLATELET # BLD AUTO: 240 K/UL (ref 150–450)
PMV BLD AUTO: 9.9 FL (ref 9.2–12.9)
POC IONIZED CALCIUM: 1.14 MMOL/L (ref 1.06–1.42)
POC TCO2 (MEASURED): 26 MMOL/L (ref 23–29)
POTASSIUM BLD-SCNC: 3.5 MMOL/L (ref 3.5–5.1)
RBC # BLD AUTO: 4.27 M/UL (ref 4–5.4)
SAMPLE: ABNORMAL
SODIUM BLD-SCNC: 132 MMOL/L (ref 136–145)
WBC # BLD AUTO: 6.58 K/UL (ref 3.9–12.7)

## 2021-10-07 PROCEDURE — 80047 BASIC METABLC PNL IONIZED CA: CPT | Mod: HCNC

## 2021-10-07 PROCEDURE — 99285 EMERGENCY DEPT VISIT HI MDM: CPT | Mod: 25,HCNC

## 2021-10-07 PROCEDURE — 99284 EMERGENCY DEPT VISIT MOD MDM: CPT | Mod: HCNC,,, | Performed by: PHYSICIAN ASSISTANT

## 2021-10-07 PROCEDURE — 99284 PR EMERGENCY DEPT VISIT,LEVEL IV: ICD-10-PCS | Mod: HCNC,,, | Performed by: PHYSICIAN ASSISTANT

## 2021-10-07 PROCEDURE — 85025 COMPLETE CBC W/AUTO DIFF WBC: CPT | Mod: HCNC | Performed by: PHYSICIAN ASSISTANT

## 2021-10-07 PROCEDURE — 25000003 PHARM REV CODE 250: Mod: HCNC | Performed by: PHYSICIAN ASSISTANT

## 2021-10-07 PROCEDURE — 25500020 PHARM REV CODE 255: Mod: HCNC | Performed by: EMERGENCY MEDICINE

## 2021-10-07 RX ORDER — LIDOCAINE 50 MG/G
1 PATCH TOPICAL DAILY
Qty: 15 PATCH | Refills: 0 | Status: SHIPPED | OUTPATIENT
Start: 2021-10-07 | End: 2022-03-22

## 2021-10-07 RX ORDER — ACETAMINOPHEN 500 MG
1000 TABLET ORAL
Status: COMPLETED | OUTPATIENT
Start: 2021-10-07 | End: 2021-10-07

## 2021-10-07 RX ORDER — LIDOCAINE 50 MG/G
1 PATCH TOPICAL
Status: DISCONTINUED | OUTPATIENT
Start: 2021-10-07 | End: 2021-10-07 | Stop reason: HOSPADM

## 2021-10-07 RX ADMIN — IOHEXOL 75 ML: 350 INJECTION, SOLUTION INTRAVENOUS at 02:10

## 2021-10-07 RX ADMIN — ACETAMINOPHEN 1000 MG: 500 TABLET ORAL at 01:10

## 2021-10-07 RX ADMIN — LIDOCAINE 1 PATCH: 50 PATCH CUTANEOUS at 01:10

## 2021-10-11 ENCOUNTER — PATIENT MESSAGE (OUTPATIENT)
Dept: INTERNAL MEDICINE | Facility: CLINIC | Age: 86
End: 2021-10-11

## 2021-10-11 ENCOUNTER — TELEPHONE (OUTPATIENT)
Dept: INTERNAL MEDICINE | Facility: CLINIC | Age: 86
End: 2021-10-11

## 2021-10-28 NOTE — PROVIDER PROGRESS NOTES - EMERGENCY DEPT.
Encounter Date: 10/8/2017    ED Physician Progress Notes         EKG - STEMI Decision  Initial Reading: No STEMI present.       HPI         Please note that this dictation was completed with Dragon, computer voice recognition software. Quite often unanticipated grammatical, syntax, homophones, and other interpretive errors are inadvertently transcribed by the computer software. Please disregard these errors. Additionally, please excuse any errors that have escaped final proofreading. History of present illness:    Patient is a 71-year-old female with history of type 1 diabetes who presents now with complaints of vomiting height blood glucose and ketones high. Mother states patient was in her usual state of good health until approximately 1 week earlier when she was noted to have ketones in the urine. Mother states and patient also verifies that sugars have been running high normally around the 200s but now have been as high as 3-400. Mother states he did have use correction only once when needed for high sugars over the week. Patient has been managed by insulin injections. On review of  Medical records: And current management is Lantus 35 units daily at 8 PM  Insulin management is 1 unit for every 20 carbs for breakfast and dinner and 1 unit for every 24 carbs for lunch  Target sugars 120  Correction factor is 30. Mother states patient ate breakfast sugars were 400 she was given insulin correction with a total of 16 units regular insulin  given at breakfast which he ate and then began vomiting. No fevers. Occasional headaches intermittently no sore throat no cough no chest pain no abdominal pain no dysuria no hematuria last menstrual period was 1 week earlier although patient states she has irregular periods and was not sure. No other complaints no modifying factors no other concerns    Review of systems: A 10 point review was conducted.   All pertinent positive and negatives are as stated in the HPI  Allergies: None  Medications: Insulin Lantus  Immunizations: Up-to-date  Past medical history: Positive for type 1 diabetes  Family history: Noncontributory to this visit  Social history: Lives with family.   Attends school    Past Medical History:   Diagnosis Date    Allergic rhinitis 01/20/2017    rx montelukast and zyrtec    Blurred vision 02/07/2019    visual acuity screen normal, zatidor BID and referral to ped ophtho    BMI (body mass index), pediatric, 95-99% for age 12/19/2019    Cellulitis of abdominal wall 09/28/2017    rx keflec    Closed nondisplaced fracture of phalanx of lesser toe of right foot 03/01/2019    Constipation 04/05/2018    rx glycolax    Dysmenorrhea 12/28/2020    rx advil and midol    Folliculitis of perineum 09/06/2017    rx bactroban    Heart murmur 02/28/2018    referral to cardiology    Hyperglycemia due to type 1 diabetes mellitus ([facility] [address] 02/11/2019    referred to ED    Influenza A 11/01/2018    Mononucleosis 11/01/2017    Otitis externa of right ear 08/21/2018    rx ciprodex    Pain in a tooth or teeth 01/18/2021    referral to oral maxillofacial surgery    Paronychia of left thumb 09/13/2018    rx augmentin    Uncontrolled type 1 diabetes mellitus ([address] 02/19/2019    referral to Peds Endo    Urinary tract infection 03/10/2020    rx cefdinir    Yeast infection 09/13/2018    rx diflucan    Yeast infection 11/14/2018    rx diflucan    Yeast infection 01/17/2020    rx diflucan    Yeast vaginitis 01/20/2017    rx diflucan    Yeast vaginitis 09/28/2017    rx diflucan    Yeast vaginitis 02/04/2020    rx diflucan    Yeast vaginitis 11/03/2020    rx nystatin       Past Surgical History:   Procedure Laterality Date    HX TYMPANOSTOMY  2010         Family History:   Problem Relation Age of Onset    Anemia Mother     Anxiety Mother     Hypertension Mother     Hypertension Father     Hypertension Paternal Grandfather     Type I Diabetes Maternal Grandmother     Diabetes Maternal Grandfather     Heart Attack Maternal Grandfather     No Known Problems Paternal Grandmother Social History     Socioeconomic History    Marital status: SINGLE     Spouse name: Not on file    Number of children: Not on file    Years of education: Not on file    Highest education level: Not on file   Occupational History    Not on file   Tobacco Use    Smoking status: Passive Smoke Exposure - Never Smoker    Smokeless tobacco: Never Used   Substance and Sexual Activity    Alcohol use: Never    Drug use: Not on file    Sexual activity: Not on file   Other Topics Concern     Service Not Asked    Blood Transfusions Not Asked    Caffeine Concern Not Asked    Occupational Exposure Not Asked    Hobby Hazards Not Asked    Sleep Concern Not Asked    Stress Concern Not Asked    Weight Concern Not Asked    Special Diet Not Asked    Back Care Not Asked    Exercise Not Asked    Bike Helmet Not Asked   [address] Not Asked    Self-Exams Not Asked   Social History Narrative    Not on file     Social Determinants of Health     Financial Resource Strain:     Difficulty of Paying Living Expenses:    Food Insecurity:     Worried About Running Out of Food in the Last Year:     Ran Out of Food in the Last Year:    Transportation Needs:     Lack of Transportation (Medical):  Lack of Transportation (Non-Medical):    Physical Activity:     Days of Exercise per Week:     Minutes of Exercise per Session:    Stress:     Feeling of Stress :    Social Connections:     Frequency of Communication with Friends and Family:     Frequency of Social Gatherings with Friends and Family:     Attends Mandaeism Services:     Active Member of Clubs or Organizations:     Attends Club or Organization Meetings:     Marital Status:    Intimate Partner Violence:     Fear of Current or Ex-Partner:     Emotionally Abused:     Physically Abused:     Sexually Abused: ALLERGIES: Patient has no known allergies.     Review of Systems   Constitutional: Negative for activity change, appetite change and fever. HENT: Negative for congestion, sore throat and voice change. Eyes: Negative for photophobia and visual disturbance. Respiratory: Negative for cough, shortness of breath and wheezing. Cardiovascular: Negative for chest pain and leg swelling. Gastrointestinal: Positive for vomiting. Negative for abdominal pain and diarrhea. Genitourinary: Negative for decreased urine volume, difficulty urinating and menstrual problem. Musculoskeletal: Negative for gait problem and neck pain. Skin: Negative for rash. Neurological: Negative for weakness. All other systems reviewed and are negative. Vitals:    10/28/21 1002   BP: 112/91   Pulse: 92   Resp: 16   Temp: 97.4 °F (36.3 °C)   SpO2: 99%   Weight: 58.1 kg            Physical Exam  Vitals and nursing note reviewed. PE:  GEN:  WDWN female alert non toxic in NAD talkative interactive well appearing, GCS 15  SK: CRT < 2 sec, good distal pulses. No lesions, no rashes, dry lips, moist mm  HEENT: H: AT/NC. E: EOMI , PERRL, E: TM clear  N/T: Clear oropharynx  NECK: supple, no meningismus. No pain on palpation  Chest: Clear to auscultation, clear BS. NO rales, rhonchi, wheezes or distress. No Retraction. Chest Wall: no tenderness on palpation  CV: Regular rate and rhythm. Normal S1 S2 . No murmur, gallops or thrills  ABD: Soft non tender, no hepatomegaly, good bowel sound, no guarding, no masses, benign  MS: FROM all extremities, no long bone tenderness. No swelling, cyanosis, no edema. Good distal pulses. Gait normal  NEURO: Alert. No focality. Cranial nerves 2-12 grossly intact.  GCS 15  Behavior and mentation appropriate for age        MDM  Number of Diagnoses or Management Options  Dehydration  H/O diabetes mellitus  Hyperglycemia  Ketonuria  Vomiting, intractability of vomiting not specified, presence of nausea not specified, unspecified vomiting type  Diagnosis management comments: Medical decision making:    Differential diagnosis includes diabetic ketoacidosis, hyperglycemia ketonuria viral illness causing vomiting    Point-of-care Chem-8: Glucose 220 sodium 137 potassium 4.2 chloride 104 bicarb 24    CBC: White blood cell count 10.7 hemoglobin 14 hematocrit 42 platelets 206270 differential 74 segs 20 lymphs    Urinalysis: Significant for glucose greater than 1000 ketones greater than 80  CMP: Sodium 134 potassium 4.1 chloride 1005 bicarb 22 4 glucose 204 BUN 14 creatinine 0.6  Venous blood gas: pH 7.35 PCO2 44 bicarb 25 base deficit -5    Patient received normal saline bolus x1 on repeat glucose after bolus glucose 216  Urine hCG: Negative    Spoke with Dr. Moises Mcdaniel, pediatric endocrinology. Case and management discussed. Family to call him at dinner with glucose  Result to discuss insulin & management. Patient has taken sugar-free diet Pepsi 12 ounces in the ER without problem and discharged home    All precautions reviewed with patient and mother. They are understanding and agreeable to plan. They will follow with  this evening at dinnertime with results of glucose testing and ketones to discuss insulin management and further plan of care.   They will return to the ER for any worsening symptoms including any trouble breathing, fevers, persistent vomiting, abdominal pain, change in behavior with lethargy irritability or other new concerns    Clinical impression:  Vomiting  Hyperglycemia  Ketonuria  History of diabetes       Amount and/or Complexity of Data Reviewed  Clinical lab tests: ordered and reviewed  Discuss the patient with other providers: yes           Procedures

## 2021-11-06 ENCOUNTER — LAB VISIT (OUTPATIENT)
Dept: LAB | Facility: HOSPITAL | Age: 86
End: 2021-11-06
Attending: INTERNAL MEDICINE
Payer: MEDICARE

## 2021-11-06 ENCOUNTER — OFFICE VISIT (OUTPATIENT)
Dept: INTERNAL MEDICINE | Facility: CLINIC | Age: 86
End: 2021-11-06
Payer: MEDICARE

## 2021-11-06 VITALS
WEIGHT: 128.5 LBS | BODY MASS INDEX: 23.65 KG/M2 | HEART RATE: 80 BPM | DIASTOLIC BLOOD PRESSURE: 62 MMHG | OXYGEN SATURATION: 95 % | SYSTOLIC BLOOD PRESSURE: 136 MMHG | HEIGHT: 62 IN

## 2021-11-06 DIAGNOSIS — R29.6 FREQUENT FALLS: ICD-10-CM

## 2021-11-06 DIAGNOSIS — E05.00 GRAVES DISEASE: ICD-10-CM

## 2021-11-06 DIAGNOSIS — F41.0 PANIC ATTACKS: ICD-10-CM

## 2021-11-06 DIAGNOSIS — M81.0 POST-MENOPAUSAL OSTEOPOROSIS: ICD-10-CM

## 2021-11-06 DIAGNOSIS — M81.0 OSTEOPOROSIS, POST-MENOPAUSAL: ICD-10-CM

## 2021-11-06 DIAGNOSIS — F41.1 GENERALIZED ANXIETY DISORDER: ICD-10-CM

## 2021-11-06 DIAGNOSIS — L29.9 ITCHING: Primary | ICD-10-CM

## 2021-11-06 LAB
T4 FREE SERPL-MCNC: 0.77 NG/DL (ref 0.71–1.51)
TSH SERPL DL<=0.005 MIU/L-ACNC: 3.4 UIU/ML (ref 0.4–4)

## 2021-11-06 PROCEDURE — 99499 RISK ADDL DX/OHS AUDIT: ICD-10-PCS | Mod: S$GLB,,, | Performed by: INTERNAL MEDICINE

## 2021-11-06 PROCEDURE — 1159F PR MEDICATION LIST DOCUMENTED IN MEDICAL RECORD: ICD-10-PCS | Mod: HCNC,CPTII,S$GLB, | Performed by: INTERNAL MEDICINE

## 2021-11-06 PROCEDURE — 36415 COLL VENOUS BLD VENIPUNCTURE: CPT | Mod: HCNC | Performed by: INTERNAL MEDICINE

## 2021-11-06 PROCEDURE — 1159F MED LIST DOCD IN RCRD: CPT | Mod: HCNC,CPTII,S$GLB, | Performed by: INTERNAL MEDICINE

## 2021-11-06 PROCEDURE — 3288F FALL RISK ASSESSMENT DOCD: CPT | Mod: HCNC,CPTII,S$GLB, | Performed by: INTERNAL MEDICINE

## 2021-11-06 PROCEDURE — 99499 UNLISTED E&M SERVICE: CPT | Mod: S$GLB,,, | Performed by: INTERNAL MEDICINE

## 2021-11-06 PROCEDURE — 3075F PR MOST RECENT SYSTOLIC BLOOD PRESS GE 130-139MM HG: ICD-10-PCS | Mod: HCNC,CPTII,S$GLB, | Performed by: INTERNAL MEDICINE

## 2021-11-06 PROCEDURE — 1160F RVW MEDS BY RX/DR IN RCRD: CPT | Mod: HCNC,CPTII,S$GLB, | Performed by: INTERNAL MEDICINE

## 2021-11-06 PROCEDURE — 1126F AMNT PAIN NOTED NONE PRSNT: CPT | Mod: HCNC,CPTII,S$GLB, | Performed by: INTERNAL MEDICINE

## 2021-11-06 PROCEDURE — 99999 PR PBB SHADOW E&M-EST. PATIENT-LVL IV: CPT | Mod: PBBFAC,HCNC,, | Performed by: INTERNAL MEDICINE

## 2021-11-06 PROCEDURE — 1100F PTFALLS ASSESS-DOCD GE2>/YR: CPT | Mod: HCNC,CPTII,S$GLB, | Performed by: INTERNAL MEDICINE

## 2021-11-06 PROCEDURE — 3075F SYST BP GE 130 - 139MM HG: CPT | Mod: HCNC,CPTII,S$GLB, | Performed by: INTERNAL MEDICINE

## 2021-11-06 PROCEDURE — 99214 PR OFFICE/OUTPT VISIT, EST, LEVL IV, 30-39 MIN: ICD-10-PCS | Mod: HCNC,S$GLB,, | Performed by: INTERNAL MEDICINE

## 2021-11-06 PROCEDURE — 1100F PR PT FALLS ASSESS DOC 2+ FALLS/FALL W/INJURY/YR: ICD-10-PCS | Mod: HCNC,CPTII,S$GLB, | Performed by: INTERNAL MEDICINE

## 2021-11-06 PROCEDURE — 84443 ASSAY THYROID STIM HORMONE: CPT | Mod: HCNC | Performed by: INTERNAL MEDICINE

## 2021-11-06 PROCEDURE — 1126F PR PAIN SEVERITY QUANTIFIED, NO PAIN PRESENT: ICD-10-PCS | Mod: HCNC,CPTII,S$GLB, | Performed by: INTERNAL MEDICINE

## 2021-11-06 PROCEDURE — 3078F PR MOST RECENT DIASTOLIC BLOOD PRESSURE < 80 MM HG: ICD-10-PCS | Mod: HCNC,CPTII,S$GLB, | Performed by: INTERNAL MEDICINE

## 2021-11-06 PROCEDURE — 1160F PR REVIEW ALL MEDS BY PRESCRIBER/CLIN PHARMACIST DOCUMENTED: ICD-10-PCS | Mod: HCNC,CPTII,S$GLB, | Performed by: INTERNAL MEDICINE

## 2021-11-06 PROCEDURE — 84439 ASSAY OF FREE THYROXINE: CPT | Mod: HCNC | Performed by: INTERNAL MEDICINE

## 2021-11-06 PROCEDURE — 3078F DIAST BP <80 MM HG: CPT | Mod: HCNC,CPTII,S$GLB, | Performed by: INTERNAL MEDICINE

## 2021-11-06 PROCEDURE — 99214 OFFICE O/P EST MOD 30 MIN: CPT | Mod: HCNC,S$GLB,, | Performed by: INTERNAL MEDICINE

## 2021-11-06 PROCEDURE — 99999 PR PBB SHADOW E&M-EST. PATIENT-LVL IV: ICD-10-PCS | Mod: PBBFAC,HCNC,, | Performed by: INTERNAL MEDICINE

## 2021-11-06 PROCEDURE — 3288F PR FALLS RISK ASSESSMENT DOCUMENTED: ICD-10-PCS | Mod: HCNC,CPTII,S$GLB, | Performed by: INTERNAL MEDICINE

## 2021-11-06 RX ORDER — PAROXETINE HYDROCHLORIDE 20 MG/1
20 TABLET, FILM COATED ORAL DAILY
Qty: 90 TABLET | Refills: 1 | Status: SHIPPED | OUTPATIENT
Start: 2021-11-06 | End: 2021-11-09 | Stop reason: SINTOL

## 2021-11-07 ENCOUNTER — PATIENT MESSAGE (OUTPATIENT)
Dept: INTERNAL MEDICINE | Facility: CLINIC | Age: 86
End: 2021-11-07
Payer: MEDICARE

## 2021-11-07 DIAGNOSIS — L29.9 ITCHING: Primary | ICD-10-CM

## 2021-11-07 PROBLEM — R29.6 FREQUENT FALLS: Status: ACTIVE | Noted: 2021-11-07

## 2021-11-08 ENCOUNTER — TELEPHONE (OUTPATIENT)
Dept: ENDOCRINOLOGY | Facility: CLINIC | Age: 86
End: 2021-11-08
Payer: MEDICARE

## 2021-11-08 ENCOUNTER — LAB VISIT (OUTPATIENT)
Dept: LAB | Facility: HOSPITAL | Age: 86
End: 2021-11-08
Attending: INTERNAL MEDICINE
Payer: MEDICARE

## 2021-11-08 DIAGNOSIS — E05.00 GRAVES DISEASE: Primary | ICD-10-CM

## 2021-11-08 DIAGNOSIS — L29.9 ITCHING: ICD-10-CM

## 2021-11-08 LAB
ALBUMIN SERPL BCP-MCNC: 3.9 G/DL (ref 3.5–5.2)
ALP SERPL-CCNC: 67 U/L (ref 55–135)
ALT SERPL W/O P-5'-P-CCNC: 13 U/L (ref 10–44)
ANION GAP SERPL CALC-SCNC: 9 MMOL/L (ref 8–16)
AST SERPL-CCNC: 21 U/L (ref 10–40)
BILIRUB SERPL-MCNC: 0.5 MG/DL (ref 0.1–1)
BUN SERPL-MCNC: 14 MG/DL (ref 8–23)
CALCIUM SERPL-MCNC: 8.6 MG/DL (ref 8.7–10.5)
CHLORIDE SERPL-SCNC: 97 MMOL/L (ref 95–110)
CO2 SERPL-SCNC: 24 MMOL/L (ref 23–29)
CREAT SERPL-MCNC: 0.8 MG/DL (ref 0.5–1.4)
EST. GFR  (AFRICAN AMERICAN): >60 ML/MIN/1.73 M^2
EST. GFR  (NON AFRICAN AMERICAN): >60 ML/MIN/1.73 M^2
GLUCOSE SERPL-MCNC: 87 MG/DL (ref 70–110)
POTASSIUM SERPL-SCNC: 3.8 MMOL/L (ref 3.5–5.1)
PROT SERPL-MCNC: 6.4 G/DL (ref 6–8.4)
SODIUM SERPL-SCNC: 130 MMOL/L (ref 136–145)

## 2021-11-08 PROCEDURE — 36415 COLL VENOUS BLD VENIPUNCTURE: CPT | Mod: HCNC | Performed by: INTERNAL MEDICINE

## 2021-11-08 PROCEDURE — 80053 COMPREHEN METABOLIC PANEL: CPT | Mod: HCNC | Performed by: INTERNAL MEDICINE

## 2021-11-09 ENCOUNTER — PATIENT MESSAGE (OUTPATIENT)
Dept: INTERNAL MEDICINE | Facility: CLINIC | Age: 86
End: 2021-11-09
Payer: MEDICARE

## 2021-11-09 DIAGNOSIS — E87.1 HYPONATREMIA: ICD-10-CM

## 2021-11-09 DIAGNOSIS — Z79.899 MEDICATION MANAGEMENT: ICD-10-CM

## 2021-11-09 DIAGNOSIS — E05.00 GRAVES DISEASE: Primary | ICD-10-CM

## 2021-11-09 DIAGNOSIS — I10 ESSENTIAL HYPERTENSION: ICD-10-CM

## 2021-11-09 RX ORDER — ESCITALOPRAM OXALATE 5 MG/1
5 TABLET ORAL DAILY
Qty: 90 TABLET | Refills: 3 | Status: SHIPPED | OUTPATIENT
Start: 2021-11-09 | End: 2021-11-29

## 2021-12-07 ENCOUNTER — LAB VISIT (OUTPATIENT)
Dept: LAB | Facility: HOSPITAL | Age: 86
End: 2021-12-07
Attending: INTERNAL MEDICINE
Payer: MEDICARE

## 2021-12-07 DIAGNOSIS — Z79.899 MEDICATION MANAGEMENT: ICD-10-CM

## 2021-12-07 DIAGNOSIS — I10 ESSENTIAL HYPERTENSION: ICD-10-CM

## 2021-12-07 DIAGNOSIS — E05.00 GRAVES DISEASE: ICD-10-CM

## 2021-12-07 DIAGNOSIS — E87.1 HYPONATREMIA: ICD-10-CM

## 2021-12-07 LAB
ALBUMIN SERPL BCP-MCNC: 3.9 G/DL (ref 3.5–5.2)
ALP SERPL-CCNC: 68 U/L (ref 55–135)
ALT SERPL W/O P-5'-P-CCNC: 11 U/L (ref 10–44)
ANION GAP SERPL CALC-SCNC: 10 MMOL/L (ref 8–16)
AST SERPL-CCNC: 17 U/L (ref 10–40)
BASOPHILS # BLD AUTO: 0.04 K/UL (ref 0–0.2)
BASOPHILS NFR BLD: 0.8 % (ref 0–1.9)
BILIRUB SERPL-MCNC: 0.6 MG/DL (ref 0.1–1)
BUN SERPL-MCNC: 12 MG/DL (ref 8–23)
CALCIUM SERPL-MCNC: 9.5 MG/DL (ref 8.7–10.5)
CHLORIDE SERPL-SCNC: 100 MMOL/L (ref 95–110)
CO2 SERPL-SCNC: 27 MMOL/L (ref 23–29)
CREAT SERPL-MCNC: 0.9 MG/DL (ref 0.5–1.4)
DIFFERENTIAL METHOD: ABNORMAL
EOSINOPHIL # BLD AUTO: 0.2 K/UL (ref 0–0.5)
EOSINOPHIL NFR BLD: 4.2 % (ref 0–8)
ERYTHROCYTE [DISTWIDTH] IN BLOOD BY AUTOMATED COUNT: 17.1 % (ref 11.5–14.5)
EST. GFR  (AFRICAN AMERICAN): >60 ML/MIN/1.73 M^2
EST. GFR  (NON AFRICAN AMERICAN): 58.5 ML/MIN/1.73 M^2
GLUCOSE SERPL-MCNC: 96 MG/DL (ref 70–110)
HCT VFR BLD AUTO: 36.9 % (ref 37–48.5)
HGB BLD-MCNC: 11.8 G/DL (ref 12–16)
IMM GRANULOCYTES # BLD AUTO: 0.02 K/UL (ref 0–0.04)
IMM GRANULOCYTES NFR BLD AUTO: 0.4 % (ref 0–0.5)
LYMPHOCYTES # BLD AUTO: 1.6 K/UL (ref 1–4.8)
LYMPHOCYTES NFR BLD: 30.2 % (ref 18–48)
MCH RBC QN AUTO: 27.9 PG (ref 27–31)
MCHC RBC AUTO-ENTMCNC: 32 G/DL (ref 32–36)
MCV RBC AUTO: 87 FL (ref 82–98)
MONOCYTES # BLD AUTO: 0.7 K/UL (ref 0.3–1)
MONOCYTES NFR BLD: 13.8 % (ref 4–15)
NEUTROPHILS # BLD AUTO: 2.7 K/UL (ref 1.8–7.7)
NEUTROPHILS NFR BLD: 50.6 % (ref 38–73)
NRBC BLD-RTO: 0 /100 WBC
PLATELET # BLD AUTO: 237 K/UL (ref 150–450)
PMV BLD AUTO: 10.6 FL (ref 9.2–12.9)
POTASSIUM SERPL-SCNC: 4 MMOL/L (ref 3.5–5.1)
PROT SERPL-MCNC: 6.8 G/DL (ref 6–8.4)
RBC # BLD AUTO: 4.23 M/UL (ref 4–5.4)
SODIUM SERPL-SCNC: 137 MMOL/L (ref 136–145)
TSH SERPL DL<=0.005 MIU/L-ACNC: 3.08 UIU/ML (ref 0.4–4)
WBC # BLD AUTO: 5.23 K/UL (ref 3.9–12.7)

## 2021-12-07 PROCEDURE — 36415 COLL VENOUS BLD VENIPUNCTURE: CPT | Mod: HCNC | Performed by: INTERNAL MEDICINE

## 2021-12-07 PROCEDURE — 84443 ASSAY THYROID STIM HORMONE: CPT | Mod: HCNC | Performed by: INTERNAL MEDICINE

## 2021-12-07 PROCEDURE — 80053 COMPREHEN METABOLIC PANEL: CPT | Mod: HCNC | Performed by: INTERNAL MEDICINE

## 2021-12-07 PROCEDURE — 85025 COMPLETE CBC W/AUTO DIFF WBC: CPT | Mod: HCNC | Performed by: INTERNAL MEDICINE

## 2021-12-30 ENCOUNTER — PATIENT MESSAGE (OUTPATIENT)
Dept: INTERNAL MEDICINE | Facility: CLINIC | Age: 86
End: 2021-12-30
Payer: MEDICARE

## 2021-12-31 ENCOUNTER — PATIENT MESSAGE (OUTPATIENT)
Dept: INTERNAL MEDICINE | Facility: CLINIC | Age: 86
End: 2021-12-31
Payer: MEDICARE

## 2021-12-31 ENCOUNTER — INFUSION (OUTPATIENT)
Dept: INFECTIOUS DISEASES | Facility: HOSPITAL | Age: 86
End: 2021-12-31
Attending: INTERNAL MEDICINE
Payer: MEDICARE

## 2021-12-31 VITALS
HEIGHT: 61 IN | TEMPERATURE: 98 F | SYSTOLIC BLOOD PRESSURE: 130 MMHG | WEIGHT: 125 LBS | RESPIRATION RATE: 18 BRPM | HEART RATE: 81 BPM | OXYGEN SATURATION: 100 % | DIASTOLIC BLOOD PRESSURE: 58 MMHG | BODY MASS INDEX: 23.6 KG/M2

## 2021-12-31 DIAGNOSIS — U07.1 COVID: Primary | ICD-10-CM

## 2021-12-31 PROCEDURE — M0243 CASIRIVI AND IMDEVI INFUSION: HCPCS | Performed by: INTERNAL MEDICINE

## 2021-12-31 PROCEDURE — 25000003 PHARM REV CODE 250: Mod: HCNC | Performed by: INTERNAL MEDICINE

## 2021-12-31 PROCEDURE — 63600175 PHARM REV CODE 636 W HCPCS: Mod: HCNC | Performed by: INTERNAL MEDICINE

## 2021-12-31 RX ORDER — DIPHENHYDRAMINE HYDROCHLORIDE 50 MG/ML
25 INJECTION INTRAMUSCULAR; INTRAVENOUS ONCE AS NEEDED
Status: DISCONTINUED | OUTPATIENT
Start: 2021-12-31 | End: 2022-03-11

## 2021-12-31 RX ORDER — SODIUM CHLORIDE 0.9 % (FLUSH) 0.9 %
10 SYRINGE (ML) INJECTION
Status: DISCONTINUED | OUTPATIENT
Start: 2021-12-31 | End: 2023-03-27

## 2021-12-31 RX ORDER — ONDANSETRON 4 MG/1
4 TABLET, ORALLY DISINTEGRATING ORAL ONCE AS NEEDED
Status: ACTIVE | OUTPATIENT
Start: 2021-12-31 | End: 2033-05-29

## 2021-12-31 RX ORDER — ACETAMINOPHEN 325 MG/1
650 TABLET ORAL ONCE AS NEEDED
Status: DISCONTINUED | OUTPATIENT
Start: 2021-12-31 | End: 2023-03-27

## 2021-12-31 RX ORDER — ALBUTEROL SULFATE 90 UG/1
2 AEROSOL, METERED RESPIRATORY (INHALATION)
Status: DISCONTINUED | OUTPATIENT
Start: 2021-12-31 | End: 2023-03-27

## 2021-12-31 RX ORDER — EPINEPHRINE 0.3 MG/.3ML
0.3 INJECTION SUBCUTANEOUS
Status: DISCONTINUED | OUTPATIENT
Start: 2021-12-31 | End: 2022-03-11

## 2021-12-31 RX ADMIN — CASIRIVIMAB AND IMDEVIMAB 600 MG: 600; 600 INJECTION, SOLUTION, CONCENTRATE INTRAVENOUS at 03:12

## 2022-01-03 RX ORDER — BENZONATATE 200 MG/1
200 CAPSULE ORAL 3 TIMES DAILY PRN
Qty: 50 CAPSULE | Refills: 0 | Status: SHIPPED | OUTPATIENT
Start: 2022-01-03 | End: 2022-01-13

## 2022-01-08 ENCOUNTER — PATIENT MESSAGE (OUTPATIENT)
Dept: ADMINISTRATIVE | Facility: OTHER | Age: 87
End: 2022-01-08
Payer: MEDICARE

## 2022-01-09 NOTE — TELEPHONE ENCOUNTER
Dear Nurse,  Please call patient. She has sustained a vertebral fracture on the basis of osteoporosis and she would greatly benefit from active treatment.  This  intervention will build new bone to prevent future fractures.   Please ask her to call Physician assistant, Mr. Santos who heads up this multidisciplinary team, his nurse is Caterina and she will schedule Ms. Plummer.  Sincerely, Dr. Courtney Carlos  [phone] this is Caterina's number  I tried to call her \could not reach   I sent her an email    abdominal pain

## 2022-01-10 ENCOUNTER — PATIENT MESSAGE (OUTPATIENT)
Dept: INTERNAL MEDICINE | Facility: CLINIC | Age: 87
End: 2022-01-10
Payer: MEDICARE

## 2022-01-19 NOTE — ED NOTES
Pt resting in bed. No acute distress noted. Respirations even and unlabored. All vitals stable at this time. No new complaints voiced. Family at bedside. Side rails up x2. Call light within reach. Will continue to monitor.   
Two patient identifiers checked and confirmed.    APPEARANCE: Resting comfortably in no acute distress. Patient has clean hair, skin and nails. Clothing is appropriate and properly fastened.  NEURO: Awake, alert, appropriate for age, and cooperative with a calm affect; pupils equal and round. Oriented x4.  HEENT: Head symmetrical. Bilateral eyes without redness or drainage.  CARDIAC: Regular rate and rhythm. S1, S2 noted.  RESPIRATORY: Airway is open and patent. Respirations are spontaneous on room air, even and unlabored. Normal respiratory effort and rate noted.   GI/: Abdomen soft and non-distended. Patient is reported to void and stool appropriately for age.  Denies burining with urination or blood in urine.   NEUROVASCULAR: All extremities are warm and pink with +2 pulses and capillary refill less than 3 seconds. No peripheral edema noted.  MUSCULOSKELETAL: Moves all extremities well; no obvious deformities noted. Pt in wheelchair. Unable to ambulate due to extreme back pain. Pts baseline is independent ambulation. Pt reports bilateral flank pain non radiating since yesterday.  SKIN: Warm, dry, and intact. Mucus membranes moist and pink.   PSYCH: Pt has calm affect, appropriate speech and thought process.      
yes

## 2022-02-22 ENCOUNTER — OFFICE VISIT (OUTPATIENT)
Dept: ALLERGY | Facility: CLINIC | Age: 87
End: 2022-02-22
Payer: MEDICARE

## 2022-02-22 VITALS — HEIGHT: 61 IN | BODY MASS INDEX: 24.35 KG/M2 | WEIGHT: 129 LBS

## 2022-02-22 DIAGNOSIS — M81.0 OSTEOPOROSIS, POST-MENOPAUSAL: ICD-10-CM

## 2022-02-22 DIAGNOSIS — E05.00 GRAVES DISEASE: ICD-10-CM

## 2022-02-22 DIAGNOSIS — I10 ESSENTIAL HYPERTENSION: ICD-10-CM

## 2022-02-22 DIAGNOSIS — L29.9 ITCHING: Primary | ICD-10-CM

## 2022-02-22 PROCEDURE — 1159F MED LIST DOCD IN RCRD: CPT | Mod: HCNC,CPTII,S$GLB, | Performed by: ALLERGY & IMMUNOLOGY

## 2022-02-22 PROCEDURE — 99999 PR PBB SHADOW E&M-EST. PATIENT-LVL III: CPT | Mod: PBBFAC,HCNC,, | Performed by: ALLERGY & IMMUNOLOGY

## 2022-02-22 PROCEDURE — 1126F PR PAIN SEVERITY QUANTIFIED, NO PAIN PRESENT: ICD-10-PCS | Mod: HCNC,CPTII,S$GLB, | Performed by: ALLERGY & IMMUNOLOGY

## 2022-02-22 PROCEDURE — 99999 PR PBB SHADOW E&M-EST. PATIENT-LVL III: ICD-10-PCS | Mod: PBBFAC,HCNC,, | Performed by: ALLERGY & IMMUNOLOGY

## 2022-02-22 PROCEDURE — 99215 PR OFFICE/OUTPT VISIT, EST, LEVL V, 40-54 MIN: ICD-10-PCS | Mod: HCNC,S$GLB,, | Performed by: ALLERGY & IMMUNOLOGY

## 2022-02-22 PROCEDURE — 99215 OFFICE O/P EST HI 40 MIN: CPT | Mod: HCNC,S$GLB,, | Performed by: ALLERGY & IMMUNOLOGY

## 2022-02-22 PROCEDURE — 1160F RVW MEDS BY RX/DR IN RCRD: CPT | Mod: HCNC,CPTII,S$GLB, | Performed by: ALLERGY & IMMUNOLOGY

## 2022-02-22 PROCEDURE — 1126F AMNT PAIN NOTED NONE PRSNT: CPT | Mod: HCNC,CPTII,S$GLB, | Performed by: ALLERGY & IMMUNOLOGY

## 2022-02-22 PROCEDURE — 1160F PR REVIEW ALL MEDS BY PRESCRIBER/CLIN PHARMACIST DOCUMENTED: ICD-10-PCS | Mod: HCNC,CPTII,S$GLB, | Performed by: ALLERGY & IMMUNOLOGY

## 2022-02-22 PROCEDURE — 1159F PR MEDICATION LIST DOCUMENTED IN MEDICAL RECORD: ICD-10-PCS | Mod: HCNC,CPTII,S$GLB, | Performed by: ALLERGY & IMMUNOLOGY

## 2022-02-22 NOTE — PROGRESS NOTES
Courtney Plummer is a 86-year-old female who presents to clinic today for itching. She is here alone. She was last seen in allergy by Dr. Heller on 2021.    She has had increased itching since 2021. She was evaluated in Dermatology initially and started on Zyrtec. This causes drowsiness and she does not like to take.    Dr. Heller did immunoCAPs that were negative. He recommended that she increase her lubricants and take Claritin or Allegra. She has not tried either of these.    She has continued to itch without any urticaria. Her itching is generalized. She has noted it has some erythematous linear lesions around her waistline and bra strap.    She has not had any rhinitis or conjunctivitis. She denies any history of asthma.    She does have Graves disease and is followed by Dr. Mckeon in Endocrinology. She takes Tapazole.    Her daughter is visiting today for a . She asks about mast cell activation syndrome.    For ROS, FH, SH please see Allergy and Asthma Questionnaire dated today.    Some relevant pertinent positives:    Review of Systems/PMH:  She has hypertension and takes Dyazide and Norvasc.  She has osteoporosis and takes Fosamax.    Family History: Negative for atopic disease.    Home environment:  She has lived in the same house for the past year and a half. There was no water damage. There is no evidence of mold. There is no carpeting in bedroom. There is one cat that lives inside the house. She does not have any problems around the cat.    Social History: She used to smoke. She is retired.    Physical Examination:  General: Well-developed, well-nourished, no acute distress.  Head: No sinus tenderness.  Eyes: Conjunctivae:  No bulbar or palpebral conjunctival injection.  Ears: EAC's clear.  TM's clear.  No pre-auricular nodes.  Nose: Nasal Mucosa:  Pink.  Septum: No apparent deviation.  Turbinates:  No significant edema.  Polyps/Mass:  None visible.  Teeth/Gums:  No  bleeding noted.  Oropharynx: No exudates.  Neck: Supple without thyromegaly. No cervical lymphadenopathy.    Respiratory/Chest: Effort: Good.  Auscultation:  Clear bilaterally.  Cardiovascular:  No murmur, rubs, or gallop heard.   GI:  Non-tender.  No masses.  No organomegaly.  Extremities:  No cyanosis, clubbing, or edema.  Skin: Good turgor.  No urticaria or angioedema. No dermatographia.  Neuro/Psych: Oriented x 3.    Laboratory 06/10/2021:  ImmunoCAP: Negative.    Assessment:  1. Chronic itching probably related to Graves disease.  2. Graves disease.  3. Hypertension on Dyazide and Norvasc.  4. Osteoporosis on Fosamax.     Recommendations:  1. Laboratory as ordered. She is having labs drawn for Dr. Mckeon tomorrow morning.  2. Start Claritin or Allegra up to four day as needed for itching.  3. Return to clinic in two weeks.    Time attributed to the following activities: preparing to see the patient, obtaining and/or reviewing separately obtained history, performing a medically appropriate examination and/or evaluation, counseling and education the patient/family/caregiver, documenting clinical information in the electronic or other health record, independently interpreting results (not separately reported) and communicating results to the patient/family/caregiver, care coordination (not separately reported) and ordering medications, tests, or procedures. Sixty minutes.

## 2022-02-23 ENCOUNTER — PATIENT MESSAGE (OUTPATIENT)
Dept: INTERNAL MEDICINE | Facility: CLINIC | Age: 87
End: 2022-02-23
Payer: MEDICARE

## 2022-02-23 ENCOUNTER — LAB VISIT (OUTPATIENT)
Dept: LAB | Facility: HOSPITAL | Age: 87
End: 2022-02-23
Attending: INTERNAL MEDICINE
Payer: MEDICARE

## 2022-02-23 DIAGNOSIS — E05.00 GRAVES DISEASE: ICD-10-CM

## 2022-02-23 DIAGNOSIS — D84.9 IMMUNOSUPPRESSED STATUS: ICD-10-CM

## 2022-02-23 DIAGNOSIS — L29.9 ITCHING: ICD-10-CM

## 2022-02-23 LAB
T4 FREE SERPL-MCNC: 0.82 NG/DL (ref 0.71–1.51)
TSH SERPL DL<=0.005 MIU/L-ACNC: 3.43 UIU/ML (ref 0.4–4)

## 2022-02-23 PROCEDURE — 88184 FLOWCYTOMETRY/ TC 1 MARKER: CPT | Mod: HCNC | Performed by: ALLERGY & IMMUNOLOGY

## 2022-02-23 PROCEDURE — 84165 PROTEIN E-PHORESIS SERUM: CPT | Mod: 26,HCNC,, | Performed by: PATHOLOGY

## 2022-02-23 PROCEDURE — 83520 IMMUNOASSAY QUANT NOS NONAB: CPT | Mod: HCNC | Performed by: ALLERGY & IMMUNOLOGY

## 2022-02-23 PROCEDURE — 84443 ASSAY THYROID STIM HORMONE: CPT | Mod: HCNC | Performed by: INTERNAL MEDICINE

## 2022-02-23 PROCEDURE — 84165 PATHOLOGIST INTERPRETATION SPE: ICD-10-PCS | Mod: 26,HCNC,, | Performed by: PATHOLOGY

## 2022-02-23 PROCEDURE — 84165 PROTEIN E-PHORESIS SERUM: CPT | Mod: HCNC | Performed by: ALLERGY & IMMUNOLOGY

## 2022-02-23 PROCEDURE — 36415 COLL VENOUS BLD VENIPUNCTURE: CPT | Mod: HCNC | Performed by: ALLERGY & IMMUNOLOGY

## 2022-02-23 PROCEDURE — 84439 ASSAY OF FREE THYROXINE: CPT | Mod: HCNC | Performed by: INTERNAL MEDICINE

## 2022-02-23 RX ORDER — TRIAMTERENE AND HYDROCHLOROTHIAZIDE 37.5; 25 MG/1; MG/1
1 CAPSULE ORAL EVERY MORNING
Qty: 90 CAPSULE | Refills: 0 | Status: SHIPPED | OUTPATIENT
Start: 2022-02-23 | End: 2022-05-31

## 2022-02-24 LAB
ALBUMIN SERPL ELPH-MCNC: 3.85 G/DL (ref 3.35–5.55)
ALPHA1 GLOB SERPL ELPH-MCNC: 0.26 G/DL (ref 0.17–0.41)
ALPHA2 GLOB SERPL ELPH-MCNC: 0.57 G/DL (ref 0.43–0.99)
B-GLOBULIN SERPL ELPH-MCNC: 0.63 G/DL (ref 0.5–1.1)
GAMMA GLOB SERPL ELPH-MCNC: 0.68 G/DL (ref 0.67–1.58)
PATHOLOGIST INTERPRETATION SPE: NORMAL
PROT SERPL-MCNC: 6 G/DL (ref 6–8.4)
TRYPTASE LEVEL: 6.9 NG/ML

## 2022-03-07 ENCOUNTER — PATIENT MESSAGE (OUTPATIENT)
Dept: ADMINISTRATIVE | Facility: OTHER | Age: 87
End: 2022-03-07
Payer: MEDICARE

## 2022-03-09 ENCOUNTER — PATIENT MESSAGE (OUTPATIENT)
Dept: ENDOCRINOLOGY | Facility: CLINIC | Age: 87
End: 2022-03-09
Payer: MEDICARE

## 2022-03-10 ENCOUNTER — TELEPHONE (OUTPATIENT)
Dept: ENDOCRINOLOGY | Facility: CLINIC | Age: 87
End: 2022-03-10
Payer: MEDICARE

## 2022-03-10 NOTE — TELEPHONE ENCOUNTER
Spoke with patient could not find any sooner appointment. Patient decided to keep appointment scheduled.

## 2022-03-11 ENCOUNTER — OFFICE VISIT (OUTPATIENT)
Dept: ENDOCRINOLOGY | Facility: CLINIC | Age: 87
End: 2022-03-11
Payer: MEDICARE

## 2022-03-11 VITALS
SYSTOLIC BLOOD PRESSURE: 140 MMHG | HEIGHT: 61 IN | RESPIRATION RATE: 18 BRPM | BODY MASS INDEX: 24.55 KG/M2 | HEART RATE: 73 BPM | OXYGEN SATURATION: 99 % | DIASTOLIC BLOOD PRESSURE: 70 MMHG | WEIGHT: 130.06 LBS

## 2022-03-11 DIAGNOSIS — E05.00 GRAVES DISEASE: Primary | ICD-10-CM

## 2022-03-11 DIAGNOSIS — S22.080D COMPRESSION FRACTURE OF T12 VERTEBRA WITH ROUTINE HEALING: ICD-10-CM

## 2022-03-11 DIAGNOSIS — M81.0 OSTEOPOROSIS, POST-MENOPAUSAL: ICD-10-CM

## 2022-03-11 DIAGNOSIS — F41.1 GENERALIZED ANXIETY DISORDER: ICD-10-CM

## 2022-03-11 DIAGNOSIS — F41.1 ANXIETY, GENERALIZED: ICD-10-CM

## 2022-03-11 PROBLEM — R94.6 ABNORMAL THYROID FUNCTION TEST: Status: RESOLVED | Noted: 2021-04-22 | Resolved: 2022-03-11

## 2022-03-11 PROCEDURE — 3288F FALL RISK ASSESSMENT DOCD: CPT | Mod: CPTII,S$GLB,, | Performed by: INTERNAL MEDICINE

## 2022-03-11 PROCEDURE — 3077F PR MOST RECENT SYSTOLIC BLOOD PRESSURE >= 140 MM HG: ICD-10-PCS | Mod: CPTII,S$GLB,, | Performed by: INTERNAL MEDICINE

## 2022-03-11 PROCEDURE — 99999 PR PBB SHADOW E&M-EST. PATIENT-LVL V: CPT | Mod: PBBFAC,,, | Performed by: INTERNAL MEDICINE

## 2022-03-11 PROCEDURE — 1125F PR PAIN SEVERITY QUANTIFIED, PAIN PRESENT: ICD-10-PCS | Mod: CPTII,S$GLB,, | Performed by: INTERNAL MEDICINE

## 2022-03-11 PROCEDURE — 1100F PTFALLS ASSESS-DOCD GE2>/YR: CPT | Mod: CPTII,S$GLB,, | Performed by: INTERNAL MEDICINE

## 2022-03-11 PROCEDURE — 3078F PR MOST RECENT DIASTOLIC BLOOD PRESSURE < 80 MM HG: ICD-10-PCS | Mod: CPTII,S$GLB,, | Performed by: INTERNAL MEDICINE

## 2022-03-11 PROCEDURE — 3288F PR FALLS RISK ASSESSMENT DOCUMENTED: ICD-10-PCS | Mod: CPTII,S$GLB,, | Performed by: INTERNAL MEDICINE

## 2022-03-11 PROCEDURE — 1100F PR PT FALLS ASSESS DOC 2+ FALLS/FALL W/INJURY/YR: ICD-10-PCS | Mod: CPTII,S$GLB,, | Performed by: INTERNAL MEDICINE

## 2022-03-11 PROCEDURE — 3077F SYST BP >= 140 MM HG: CPT | Mod: CPTII,S$GLB,, | Performed by: INTERNAL MEDICINE

## 2022-03-11 PROCEDURE — 1159F MED LIST DOCD IN RCRD: CPT | Mod: CPTII,S$GLB,, | Performed by: INTERNAL MEDICINE

## 2022-03-11 PROCEDURE — 99214 PR OFFICE/OUTPT VISIT, EST, LEVL IV, 30-39 MIN: ICD-10-PCS | Mod: S$GLB,,, | Performed by: INTERNAL MEDICINE

## 2022-03-11 PROCEDURE — 99999 PR PBB SHADOW E&M-EST. PATIENT-LVL V: ICD-10-PCS | Mod: PBBFAC,,, | Performed by: INTERNAL MEDICINE

## 2022-03-11 PROCEDURE — 99214 OFFICE O/P EST MOD 30 MIN: CPT | Mod: S$GLB,,, | Performed by: INTERNAL MEDICINE

## 2022-03-11 PROCEDURE — 3078F DIAST BP <80 MM HG: CPT | Mod: CPTII,S$GLB,, | Performed by: INTERNAL MEDICINE

## 2022-03-11 PROCEDURE — 1125F AMNT PAIN NOTED PAIN PRSNT: CPT | Mod: CPTII,S$GLB,, | Performed by: INTERNAL MEDICINE

## 2022-03-11 PROCEDURE — 1159F PR MEDICATION LIST DOCUMENTED IN MEDICAL RECORD: ICD-10-PCS | Mod: CPTII,S$GLB,, | Performed by: INTERNAL MEDICINE

## 2022-03-11 RX ORDER — METHIMAZOLE 5 MG/1
TABLET ORAL
Qty: 45 TABLET | Refills: 3 | Status: SHIPPED | OUTPATIENT
Start: 2022-03-11 | End: 2023-04-21

## 2022-03-11 RX ORDER — ESCITALOPRAM OXALATE 5 MG/1
10 TABLET ORAL DAILY
Qty: 180 TABLET | Refills: 3 | Status: SHIPPED | OUTPATIENT
Start: 2022-03-11 | End: 2022-08-29

## 2022-03-11 NOTE — ASSESSMENT & PLAN NOTE
TSH at goal.  Will try reducing methimazole to 2.5 mg daily.  Recheck TSH in three months.  I will also recheck TRAb.

## 2022-03-11 NOTE — ASSESSMENT & PLAN NOTE
Risks include low weight,  race, menopause, previous compression fractures    Reviewed basics of quantity versus quality  She's completed a 14 month course of Forteo and was only able to get Evenity for 1 injection due to cost. Now on Fosamax since 9/2021 and tolerating it well.     RDA of calcium 1101-1925 mg/day - Calcium from food sources preferred; recommended OTC calcium supplement if she decides to limit dairy intake.  Recommended starting vitamin D3 1000 IUs per day    Fall precautions emphasized  +weight bearing exercise    Plan to repeat DXA in 4/2023. Advised to contact me if she sustains any additional fractures.

## 2022-03-11 NOTE — PROGRESS NOTES
NEW PATIENT VISIT    Subjective:      Chief Complaint:  Follow-up for Graves' disease and osteoporosis      HPI: Courtney Plummer is a 86 y.o. female who is here for follow-up evaluation for Graves' disease and osteoporosis      Past Medical History:   Diagnosis Date    Anxiety     Essential hypertension 1/8/2018    Graves disease     Hypertension     Kidney stones 12/3/2015     The patient's last visit with me was on 4/22/2021.      With regards to Graves':  History of lobectomy for thyroid nodules in the distant past.    Went to dermatologist in 4/2021 for itching in different areas without a rash. Labs were ordered to assess for secondary causes. She was on biotin at the time, so repeat labs were done to confirm.                 Started on MMI 4/2021 at 5 mg daily. TSH has been normal since.  She is still reporting some anxiety, which is a longstanding issue.  She has been on Lexapro 5 mg daily for a while.  This helped at first, but she has been having breakthrough anxiety.  This was being prescribed by her PCP, who recently retired.      Lab Results   Component Value Date    TSH 3.430 02/23/2022    TSH 3.076 12/07/2021    TSH 3.405 11/06/2021    FREET4 0.82 02/23/2022    FREET4 0.77 11/06/2021    FREET4 0.80 08/16/2021    THYROPEROXID <6.0 04/26/2021         With regards to osteoporosis:     Past osteoporosis medication: Took Forteo from 4/2019 to 10/2020, but T-scores continued to decline. She was then switched to Evenity, but this had to be discontinued 2/2 cost.   Current osteoporosis medication: Fosamax since 9/2021 - taking it appropriately.  No side effects so far.    Calcium intake?  600 mg elemental Ca2+ per day - by supplement;   Vit D3 intake?  1000 IU per day   Weight bearing exercise?   Walking  Recent falls? Tripped in February and fell - no injuries.  Patient is using the following assist devices for ambulation: none  Sense of balance? Poor, needs to take her time.  Height loss (>2 inches)?  1.5 inches    Fracture history:  2014: L3 compression fracture - can't remember what trauma  9/2018: T12 compression fracture due to fall off step ladder  10/2021: fell backwards while working on balance exercise and sustained left posterior 10/11 rib fractures and transverse processes of L1 and L2.    Tobacco use?  Yes; quit in 1980s  EtOH use?   no     Current diarrhea or h/o malabsorption? no  Thyroid disease? TBD  Anemia? no  Kidney Stones? no  Hyperparathyroidism? no    High risk medications include:  None    Post-menopausal? Age?: 45  ERT after menopause?: no    Mom or dad with hip/spine fracture or diagnosed with osteoporosis?: no       Prior radiation treatment? no  Unexplained elevation of alkaline phosphatase? no    Dental work planned? no    Lab Results   Component Value Date    PTH 85.0 (H) 06/10/2021    PTH 58.0 09/06/2018    SDHQTJMP58XT 31 06/10/2021    UTFZAWFB51XN 47 09/06/2018    AMAZVUVP74RZ 34 01/23/2018    CALCIUM 9.5 12/07/2021    CALCIUM 8.6 (L) 11/08/2021    CALCIUM 9.7 01/14/2020    ALKPHOS 68 12/07/2021    ALKPHOS 67 11/08/2021    ALKPHOS 76 01/14/2020    TSH 3.430 02/23/2022           DXA (Year)  Location 3/2019  ([place]) 4/2021  ([facility]) Percent change from previous   L-1 T-score -1.6 -    Total Hip T-score -2.9 -3.0 ([facility] since 2019)   Femoral Neck T-score -2.7 -2.5    Distal 1/3R T-score -3.1 -3.8 (Declined by 8.0% since 2019)   FRAX (MOF  /  Hip) - 24%  /  7.9%          Reviewed past medical, family, social history and updated as appropriate.    Objective:     Vitals:    03/11/22 1125   BP: (!) 140/70   Pulse: 73   Resp: 18         BP Readings from Last 5 Encounters:   03/11/22 (!) 140/70   12/31/21 (!) 130/58   11/06/21 136/62   10/07/21 (!) 151/69   05/21/21 (!) 141/71         Physical Exam  Vitals and nursing note reviewed.   Constitutional:       Appearance: She is well-developed.   HENT:      Right Ear: External ear normal.      Left Ear: External ear normal.      Nose: Nose  normal.   Eyes:      General:         Right eye: No discharge.         Left eye: No discharge.      Conjunctiva/sclera: Conjunctivae normal.   Neck:      Thyroid: No thyroid mass, thyromegaly or thyroid tenderness.      Trachea: No tracheal deviation.   Cardiovascular:      Rate and Rhythm: Normal rate and regular rhythm.      Heart sounds: No murmur heard.  Pulmonary:      Effort: Pulmonary effort is normal.      Breath sounds: Normal breath sounds.   Musculoskeletal:      Comments: No digital clubbing or extremity cyanosis   Skin:     Findings: No rash.      Comments: No subcutaneous nodules noted.   Neurological:      Mental Status: She is alert and oriented to person, place, and time.      Coordination: Coordination normal.   Psychiatric:         Mood and Affect: Mood normal.         Behavior: Behavior normal.         Thought Content: Thought content normal.      Comments: Alert and oriented to person, place, and situation.           Wt Readings from Last 30 Encounters:   03/11/22 1125 59 kg (130 lb 1.1 oz)   02/22/22 0831 58.5 kg (128 lb 15.5 oz)   12/31/21 1447 56.7 kg (125 lb)   11/06/21 1149 58.3 kg (128 lb 8.5 oz)   10/07/21 1252 55.8 kg (123 lb)   06/08/21 1441 57.1 kg (125 lb 12.4 oz)   05/21/21 1021 55.7 kg (122 lb 14.5 oz)   05/19/21 1500 55.7 kg (122 lb 12.7 oz)   05/03/21 1322 55.8 kg (123 lb 0.3 oz)   04/22/21 1026 55.7 kg (122 lb 12.7 oz)   04/21/21 0943 54.9 kg (121 lb)   07/14/20 1314 54.6 kg (120 lb 5.9 oz)   07/14/20 1314 54.6 kg (120 lb 5.9 oz)   04/15/20 1409 57.8 kg (127 lb 6.8 oz)   03/09/20 1549 58.2 kg (128 lb 4.9 oz)   02/21/20 0930 58.1 kg (128 lb)   02/03/20 1140 58.2 kg (128 lb 4.9 oz)   01/14/20 1545 59.5 kg (131 lb 2.8 oz)   11/12/19 1608 60.1 kg (132 lb 6.2 oz)   03/28/19 1021 57.8 kg (127 lb 6.8 oz)   03/09/19 1204 57.2 kg (126 lb)   02/28/19 0953 57.6 kg (126 lb 15.8 oz)   02/20/19 1448 57 kg (125 lb 11.2 oz)   01/11/19 1504 56.7 kg (125 lb)   09/23/18 2107 56.7 kg (125 lb)    09/05/18 0943 57.7 kg (127 lb 3.3 oz)   08/27/18 1426 59.4 kg (130 lb 15.3 oz)   02/18/18 0650 59 kg (130 lb)   01/23/18 1527 60.3 kg (133 lb)   01/09/18 1131 59.5 kg (131 lb 2.8 oz)       No results found for: HGBA1C  Lab Results   Component Value Date    CHOL 208 (H) 09/06/2018    HDL 46 09/06/2018    LDLCALC 148.4 09/06/2018    TRIG 68 09/06/2018    CHOLHDL 22.1 09/06/2018     Lab Results   Component Value Date     12/07/2021    K 4.0 12/07/2021     12/07/2021    CO2 27 12/07/2021    GLU 96 12/07/2021    BUN 12 12/07/2021    CREATININE 0.9 12/07/2021    CALCIUM 9.5 12/07/2021    PROT 6.8 12/07/2021    ALBUMIN 3.9 12/07/2021    BILITOT 0.6 12/07/2021    ALKPHOS 68 12/07/2021    AST 17 12/07/2021    ALT 11 12/07/2021    ANIONGAP 10 12/07/2021    ESTGFRAFRICA >60.0 12/07/2021    EGFRNONAA 58.5 (A) 12/07/2021    TSH 3.430 02/23/2022      No results found for: MICALBCREAT    Assessment/Plan:     Graves disease  TSH at goal.  Will try reducing methimazole to 2.5 mg daily.  Recheck TSH in three months.  I will also recheck TRAb.    Osteoporosis, post-menopausal  Risks include low weight,  race, menopause, previous compression fractures    Reviewed basics of quantity versus quality  She's completed a 14 month course of Forteo and was only able to get Evenity for 1 injection due to cost. Now on Fosamax since 9/2021 and tolerating it well.     RDA of calcium 2614-5052 mg/day - Calcium from food sources preferred; recommended OTC calcium supplement if she decides to limit dairy intake.  Recommended starting vitamin D3 1000 IUs per day    Fall precautions emphasized  +weight bearing exercise    Plan to repeat DXA in 4/2023. Advised to contact me if she sustains any additional fractures.    Compression fracture of T12 vertebra with routine healing  See above. At high risk for additional fractures.    BRIAN generalized anxiety disorder  Increase Lexapro to 10 mg daily. Will need to establish care with  another PCP for ongoing medical therapy for anxiety.      RTC in 12 months;  Lab in 3 months.

## 2022-03-11 NOTE — ASSESSMENT & PLAN NOTE
Increase Lexapro to 10 mg daily. Will need to establish care with another PCP for ongoing medical therapy for anxiety.

## 2022-03-12 LAB
BASOPHILS %, CD203C: 4 % (ref 0–12)
IGE RECEPTOR AB INTERPRETATION:: NORMAL

## 2022-03-14 ENCOUNTER — TELEPHONE (OUTPATIENT)
Dept: ENDOCRINOLOGY | Facility: CLINIC | Age: 87
End: 2022-03-14
Payer: MEDICARE

## 2022-03-22 ENCOUNTER — OFFICE VISIT (OUTPATIENT)
Dept: ALLERGY | Facility: CLINIC | Age: 87
End: 2022-03-22
Payer: MEDICARE

## 2022-03-22 VITALS — HEIGHT: 62 IN | WEIGHT: 131.19 LBS | BODY MASS INDEX: 24.14 KG/M2

## 2022-03-22 DIAGNOSIS — I10 ESSENTIAL HYPERTENSION: ICD-10-CM

## 2022-03-22 DIAGNOSIS — E05.00 GRAVES DISEASE: ICD-10-CM

## 2022-03-22 DIAGNOSIS — M81.0 OSTEOPOROSIS, POST-MENOPAUSAL: ICD-10-CM

## 2022-03-22 DIAGNOSIS — L29.9 ITCHING: Primary | ICD-10-CM

## 2022-03-22 PROCEDURE — 1126F AMNT PAIN NOTED NONE PRSNT: CPT | Mod: CPTII,S$GLB,, | Performed by: ALLERGY & IMMUNOLOGY

## 2022-03-22 PROCEDURE — 1159F MED LIST DOCD IN RCRD: CPT | Mod: CPTII,S$GLB,, | Performed by: ALLERGY & IMMUNOLOGY

## 2022-03-22 PROCEDURE — 99214 OFFICE O/P EST MOD 30 MIN: CPT | Mod: S$GLB,,, | Performed by: ALLERGY & IMMUNOLOGY

## 2022-03-22 PROCEDURE — 99214 PR OFFICE/OUTPT VISIT, EST, LEVL IV, 30-39 MIN: ICD-10-PCS | Mod: S$GLB,,, | Performed by: ALLERGY & IMMUNOLOGY

## 2022-03-22 PROCEDURE — 99999 PR PBB SHADOW E&M-EST. PATIENT-LVL III: CPT | Mod: PBBFAC,,, | Performed by: ALLERGY & IMMUNOLOGY

## 2022-03-22 PROCEDURE — 1126F PR PAIN SEVERITY QUANTIFIED, NO PAIN PRESENT: ICD-10-PCS | Mod: CPTII,S$GLB,, | Performed by: ALLERGY & IMMUNOLOGY

## 2022-03-22 PROCEDURE — 1160F RVW MEDS BY RX/DR IN RCRD: CPT | Mod: CPTII,S$GLB,, | Performed by: ALLERGY & IMMUNOLOGY

## 2022-03-22 PROCEDURE — 1159F PR MEDICATION LIST DOCUMENTED IN MEDICAL RECORD: ICD-10-PCS | Mod: CPTII,S$GLB,, | Performed by: ALLERGY & IMMUNOLOGY

## 2022-03-22 PROCEDURE — 99999 PR PBB SHADOW E&M-EST. PATIENT-LVL III: ICD-10-PCS | Mod: PBBFAC,,, | Performed by: ALLERGY & IMMUNOLOGY

## 2022-03-22 PROCEDURE — 1160F PR REVIEW ALL MEDS BY PRESCRIBER/CLIN PHARMACIST DOCUMENTED: ICD-10-PCS | Mod: CPTII,S$GLB,, | Performed by: ALLERGY & IMMUNOLOGY

## 2022-03-22 NOTE — PROGRESS NOTES
Courtney Plummer returns to clinic today for continued evaluation of itching. She is here alone. She was last seen February 22, 2022.    Since her last visit, she has continued to itch. She has not had any skin lesions. She has not had any urticaria or angioedema.    She has been taking Zyrtec daily. This has not relieved her symptoms. She has not tried more than one a day.    She does have Graves disease and is followed by Dr. Mckeon in Endocrinology. She is on Tapazole.    She denies any rhinitis or conjunctivitis. She denies any cough, wheezing, or shortness of breath. She denies any asthma.    [facility] PEQ ALLERGY QUESTIONNAIRE SHORT 3/20/2022   Facial swelling? No   Sinus pain? No   Sinus pressure? No   Ears: No symptoms   Hearing loss? -   Nose: No symptoms   Throat: No symptoms   Trouble swallowing? -   Eyes: No symptoms   Eye discharge? -   Lungs: No symptoms   Wheezing? -   Chest tightness? -   Rash? No   Color change of skin? No     Physical Examination:  General: Well-developed, well-nourished, no acute distress.  Head: No sinus tenderness.  Eyes: Conjunctivae:  No bulbar or palpebral conjunctival injection.  Ears: EAC's clear.  TM's clear.  No pre-auricular nodes.  Nose: Nasal Mucosa:  Pink.  Septum: No apparent deviation.  Turbinates:  No significant edema.  Polyps/Mass:  None visible.  Teeth/Gums:  No bleeding noted.  Oropharynx: No exudates.  Neck: Supple without thyromegaly. No cervical lymphadenopathy.    Respiratory/Chest: Effort: Good.  Auscultation:  Clear bilaterally.  Cardiovascular:  No murmur, rubs, or gallop heard.   GI:  Non-tender.  No masses.  No organomegaly.  Extremities:  No cyanosis, clubbing, or edema.  Skin: Good turgor.  No urticaria or angioedema. No dermatographia.  Neuro/Psych: Oriented x 3.    Laboratory 06/10/2021:  ImmunoCAP: Negative.    Laboratory 02/23/2022:  TSH: 3.430.  Free T4: 0.82.  Serum tryptase: 6.9.  SPEP: Normal.  Anti IgE receptor antibody level:  4.0.    Assessment:  1. Chronic itching probably related to Graves disease.  2. Graves disease.  3. Hypertension on Dyazide and Norvasc.  4. Osteoporosis on Fosamax.     Recommendations:  1. Zyrtec 10 milligrams a day.  2. She may increase this or either Claritin or Allegra up to 4 times a day for itching.  3. Take pictures of any skin lesions.  4. Return to clinic in 2 to 3 months or sooner if needed.    Patient education March 22, 2022:  Allergic mechanisms and treatment options were reviewed in detail. Urticaria and angioedema along with itching were reviewed.

## 2022-03-29 ENCOUNTER — TELEPHONE (OUTPATIENT)
Dept: INTERNAL MEDICINE | Facility: CLINIC | Age: 87
End: 2022-03-29

## 2022-03-29 DIAGNOSIS — E05.00 GRAVES DISEASE: ICD-10-CM

## 2022-03-29 DIAGNOSIS — I10 HYPERTENSION, ESSENTIAL: Primary | ICD-10-CM

## 2022-03-29 NOTE — TELEPHONE ENCOUNTER
----- Message from Breanna Weinberg sent at 3/29/2022 10:29 AM CDT -----  Regarding: lab order needed  Contact: patient [phone]  Doctor appointment and lab have been scheduled.  Please link lab orders to the lab appointment.  Date of doctor appointment:  8-8-2022  Date of lab appointment:  8-1-2022  Physical or F/U: annual

## 2022-05-26 ENCOUNTER — IMMUNIZATION (OUTPATIENT)
Dept: PRIMARY CARE CLINIC | Facility: CLINIC | Age: 87
End: 2022-05-26
Payer: MEDICARE

## 2022-05-26 DIAGNOSIS — Z23 NEED FOR VACCINATION: Primary | ICD-10-CM

## 2022-05-26 PROCEDURE — 0064A COVID-19, MRNA, LNP-S, PF, 100 MCG/0.25 ML DOSE VACCINE (MODERNA BOOSTER): CPT | Mod: CV19,PBBFAC | Performed by: INTERNAL MEDICINE

## 2022-05-28 NOTE — TELEPHONE ENCOUNTER
Refill Routing Note   Medication(s) are not appropriate for processing by [facility] for the following reason(s):      - Non-participating provider    [facility] action(s):  Route          Medication reconciliation completed: No     Appointments  past 12m or future 3m with PCP    Date Provider   Last Visit   11/6/2021 Courtney Carlos MD   Next Visit   Visit date not found Courtney Carlos MD   ED visits in past 90 days: 0        Note composed:6:36 PM 05/28/2022

## 2022-05-31 RX ORDER — TRIAMTERENE AND HYDROCHLOROTHIAZIDE 37.5; 25 MG/1; MG/1
CAPSULE ORAL
Qty: 90 CAPSULE | Refills: 0 | Status: SHIPPED | OUTPATIENT
Start: 2022-05-31 | End: 2022-06-01 | Stop reason: SDUPTHER

## 2022-06-03 RX ORDER — TRIAMTERENE AND HYDROCHLOROTHIAZIDE 37.5; 25 MG/1; MG/1
1 CAPSULE ORAL EVERY MORNING
Qty: 90 CAPSULE | Refills: 0 | Status: SHIPPED | OUTPATIENT
Start: 2022-06-03 | End: 2022-08-29

## 2022-06-14 ENCOUNTER — LAB VISIT (OUTPATIENT)
Dept: LAB | Facility: HOSPITAL | Age: 87
End: 2022-06-14
Attending: INTERNAL MEDICINE
Payer: MEDICARE

## 2022-06-14 DIAGNOSIS — E05.00 GRAVES DISEASE: ICD-10-CM

## 2022-06-14 LAB
T4 FREE SERPL-MCNC: 0.86 NG/DL (ref 0.71–1.51)
TSH SERPL DL<=0.005 MIU/L-ACNC: 1.48 UIU/ML (ref 0.4–4)

## 2022-06-14 PROCEDURE — 36415 COLL VENOUS BLD VENIPUNCTURE: CPT | Performed by: INTERNAL MEDICINE

## 2022-06-14 PROCEDURE — 84443 ASSAY THYROID STIM HORMONE: CPT | Performed by: INTERNAL MEDICINE

## 2022-06-14 PROCEDURE — 84439 ASSAY OF FREE THYROXINE: CPT | Performed by: INTERNAL MEDICINE

## 2022-06-14 PROCEDURE — 83520 IMMUNOASSAY QUANT NOS NONAB: CPT | Performed by: INTERNAL MEDICINE

## 2022-06-16 LAB — TSH RECEP AB SER-ACNC: 1.79 IU/L (ref 0–1.75)

## 2022-07-04 ENCOUNTER — PATIENT MESSAGE (OUTPATIENT)
Dept: INTERNAL MEDICINE | Facility: CLINIC | Age: 87
End: 2022-07-04
Payer: MEDICARE

## 2022-07-04 DIAGNOSIS — M81.0 POST-MENOPAUSAL OSTEOPOROSIS: ICD-10-CM

## 2022-07-05 RX ORDER — ALENDRONATE SODIUM 70 MG/1
70 TABLET ORAL
Qty: 4 TABLET | Refills: 2 | OUTPATIENT
Start: 2022-07-05

## 2022-08-01 ENCOUNTER — LAB VISIT (OUTPATIENT)
Dept: LAB | Facility: HOSPITAL | Age: 87
End: 2022-08-01
Payer: MEDICARE

## 2022-08-01 DIAGNOSIS — I10 HYPERTENSION, ESSENTIAL: ICD-10-CM

## 2022-08-01 DIAGNOSIS — E05.00 GRAVES DISEASE: ICD-10-CM

## 2022-08-01 LAB
ALBUMIN SERPL BCP-MCNC: 4 G/DL (ref 3.5–5.2)
ALP SERPL-CCNC: 52 U/L (ref 55–135)
ALT SERPL W/O P-5'-P-CCNC: 11 U/L (ref 10–44)
ANION GAP SERPL CALC-SCNC: 12 MMOL/L (ref 8–16)
AST SERPL-CCNC: 17 U/L (ref 10–40)
BASOPHILS # BLD AUTO: 0.03 K/UL (ref 0–0.2)
BASOPHILS NFR BLD: 0.6 % (ref 0–1.9)
BILIRUB SERPL-MCNC: 0.6 MG/DL (ref 0.1–1)
BUN SERPL-MCNC: 14 MG/DL (ref 8–23)
CALCIUM SERPL-MCNC: 9.2 MG/DL (ref 8.7–10.5)
CHLORIDE SERPL-SCNC: 100 MMOL/L (ref 95–110)
CO2 SERPL-SCNC: 26 MMOL/L (ref 23–29)
CREAT SERPL-MCNC: 0.8 MG/DL (ref 0.5–1.4)
DIFFERENTIAL METHOD: ABNORMAL
EOSINOPHIL # BLD AUTO: 0.2 K/UL (ref 0–0.5)
EOSINOPHIL NFR BLD: 3.4 % (ref 0–8)
ERYTHROCYTE [DISTWIDTH] IN BLOOD BY AUTOMATED COUNT: 16.9 % (ref 11.5–14.5)
EST. GFR  (NO RACE VARIABLE): >60 ML/MIN/1.73 M^2
GLUCOSE SERPL-MCNC: 95 MG/DL (ref 70–110)
HCT VFR BLD AUTO: 35.3 % (ref 37–48.5)
HGB BLD-MCNC: 11.4 G/DL (ref 12–16)
IMM GRANULOCYTES # BLD AUTO: 0.01 K/UL (ref 0–0.04)
IMM GRANULOCYTES NFR BLD AUTO: 0.2 % (ref 0–0.5)
LYMPHOCYTES # BLD AUTO: 1.3 K/UL (ref 1–4.8)
LYMPHOCYTES NFR BLD: 28.8 % (ref 18–48)
MCH RBC QN AUTO: 28.9 PG (ref 27–31)
MCHC RBC AUTO-ENTMCNC: 32.3 G/DL (ref 32–36)
MCV RBC AUTO: 90 FL (ref 82–98)
MONOCYTES # BLD AUTO: 0.7 K/UL (ref 0.3–1)
MONOCYTES NFR BLD: 15.3 % (ref 4–15)
NEUTROPHILS # BLD AUTO: 2.4 K/UL (ref 1.8–7.7)
NEUTROPHILS NFR BLD: 51.7 % (ref 38–73)
NRBC BLD-RTO: 0 /100 WBC
PLATELET # BLD AUTO: 212 K/UL (ref 150–450)
PMV BLD AUTO: 11.1 FL (ref 9.2–12.9)
POTASSIUM SERPL-SCNC: 4.1 MMOL/L (ref 3.5–5.1)
PROT SERPL-MCNC: 6.3 G/DL (ref 6–8.4)
RBC # BLD AUTO: 3.94 M/UL (ref 4–5.4)
SODIUM SERPL-SCNC: 138 MMOL/L (ref 136–145)
TSH SERPL DL<=0.005 MIU/L-ACNC: 1.65 UIU/ML (ref 0.4–4)
WBC # BLD AUTO: 4.65 K/UL (ref 3.9–12.7)

## 2022-08-01 PROCEDURE — 80053 COMPREHEN METABOLIC PANEL: CPT | Performed by: INTERNAL MEDICINE

## 2022-08-01 PROCEDURE — 84443 ASSAY THYROID STIM HORMONE: CPT | Performed by: INTERNAL MEDICINE

## 2022-08-01 PROCEDURE — 36415 COLL VENOUS BLD VENIPUNCTURE: CPT | Performed by: INTERNAL MEDICINE

## 2022-08-01 PROCEDURE — 85025 COMPLETE CBC W/AUTO DIFF WBC: CPT | Performed by: INTERNAL MEDICINE

## 2022-08-29 ENCOUNTER — OFFICE VISIT (OUTPATIENT)
Dept: INTERNAL MEDICINE | Facility: CLINIC | Age: 87
End: 2022-08-29
Payer: MEDICARE

## 2022-08-29 VITALS
OXYGEN SATURATION: 98 % | HEIGHT: 61 IN | HEART RATE: 83 BPM | DIASTOLIC BLOOD PRESSURE: 70 MMHG | BODY MASS INDEX: 25.31 KG/M2 | WEIGHT: 134.06 LBS | SYSTOLIC BLOOD PRESSURE: 137 MMHG

## 2022-08-29 DIAGNOSIS — D69.2 SENILE PURPURA: ICD-10-CM

## 2022-08-29 DIAGNOSIS — I10 HYPERTENSION, UNSPECIFIED TYPE: ICD-10-CM

## 2022-08-29 DIAGNOSIS — I70.0 ATHEROSCLEROSIS OF AORTA: ICD-10-CM

## 2022-08-29 DIAGNOSIS — F41.1 GENERALIZED ANXIETY DISORDER: ICD-10-CM

## 2022-08-29 DIAGNOSIS — D64.9 ANEMIA, UNSPECIFIED TYPE: ICD-10-CM

## 2022-08-29 DIAGNOSIS — Z76.89 ENCOUNTER TO ESTABLISH CARE: Primary | ICD-10-CM

## 2022-08-29 PROCEDURE — 1101F PT FALLS ASSESS-DOCD LE1/YR: CPT | Mod: CPTII,S$GLB,, | Performed by: INTERNAL MEDICINE

## 2022-08-29 PROCEDURE — 1126F AMNT PAIN NOTED NONE PRSNT: CPT | Mod: CPTII,S$GLB,, | Performed by: INTERNAL MEDICINE

## 2022-08-29 PROCEDURE — 99214 PR OFFICE/OUTPT VISIT, EST, LEVL IV, 30-39 MIN: ICD-10-PCS | Mod: S$GLB,,, | Performed by: INTERNAL MEDICINE

## 2022-08-29 PROCEDURE — 99499 RISK ADDL DX/OHS AUDIT: ICD-10-PCS | Mod: S$GLB,,, | Performed by: INTERNAL MEDICINE

## 2022-08-29 PROCEDURE — 99999 PR PBB SHADOW E&M-EST. PATIENT-LVL IV: ICD-10-PCS | Mod: PBBFAC,,, | Performed by: INTERNAL MEDICINE

## 2022-08-29 PROCEDURE — 99999 PR PBB SHADOW E&M-EST. PATIENT-LVL IV: CPT | Mod: PBBFAC,,, | Performed by: INTERNAL MEDICINE

## 2022-08-29 PROCEDURE — 1159F PR MEDICATION LIST DOCUMENTED IN MEDICAL RECORD: ICD-10-PCS | Mod: CPTII,S$GLB,, | Performed by: INTERNAL MEDICINE

## 2022-08-29 PROCEDURE — 99214 OFFICE O/P EST MOD 30 MIN: CPT | Mod: S$GLB,,, | Performed by: INTERNAL MEDICINE

## 2022-08-29 PROCEDURE — 99499 UNLISTED E&M SERVICE: CPT | Mod: S$GLB,,, | Performed by: INTERNAL MEDICINE

## 2022-08-29 PROCEDURE — 1101F PR PT FALLS ASSESS DOC 0-1 FALLS W/OUT INJ PAST YR: ICD-10-PCS | Mod: CPTII,S$GLB,, | Performed by: INTERNAL MEDICINE

## 2022-08-29 PROCEDURE — 3288F PR FALLS RISK ASSESSMENT DOCUMENTED: ICD-10-PCS | Mod: CPTII,S$GLB,, | Performed by: INTERNAL MEDICINE

## 2022-08-29 PROCEDURE — 1159F MED LIST DOCD IN RCRD: CPT | Mod: CPTII,S$GLB,, | Performed by: INTERNAL MEDICINE

## 2022-08-29 PROCEDURE — 1126F PR PAIN SEVERITY QUANTIFIED, NO PAIN PRESENT: ICD-10-PCS | Mod: CPTII,S$GLB,, | Performed by: INTERNAL MEDICINE

## 2022-08-29 PROCEDURE — 3288F FALL RISK ASSESSMENT DOCD: CPT | Mod: CPTII,S$GLB,, | Performed by: INTERNAL MEDICINE

## 2022-08-29 RX ORDER — ESCITALOPRAM OXALATE 10 MG/1
10 TABLET ORAL DAILY
Qty: 30 TABLET | Refills: 11 | Status: SHIPPED | OUTPATIENT
Start: 2022-08-29 | End: 2022-09-01 | Stop reason: ALTCHOICE

## 2022-08-29 RX ORDER — AMLODIPINE BESYLATE 5 MG/1
5 TABLET ORAL DAILY
Qty: 30 TABLET | Refills: 11 | Status: SHIPPED | OUTPATIENT
Start: 2022-08-29 | End: 2023-04-18

## 2022-08-29 NOTE — PATIENT INSTRUCTIONS
STOP TRIAMTEREN-HCTZ (THE WATER PILL)  INCREASE AMLODIPINE TO 5 MG DAILY WHICH HAS BEEN SENT TO YOUR PHARMACY  INCREASE LEXAPRO TO 10 MG DAILY WHICH HAS BEEN SENT TO YOU PHARMACY

## 2022-08-29 NOTE — PROGRESS NOTES
Subjective:       Patient ID: Courtney Plummer is a 87 y.o. female.    Chief Complaint: Establish Care    HPI    Mrs. Plummer is a very pleasant 88 yo female who presents to [facility].     She states she is waking multiple times in the night to urinate. Currently on triamterene-hctz. Denies any dysuria or hematuria.     She has been having increased symptoms of her Generalized anxiety disorder with racing thoughts and mild hyperventilation episodes. She was taken of Paxil in November and started on lexapro 5 mg daily. States it has been worse since this change.     She had diffuse pruritis for the last year. She has seen Allergy, dermatology and endo for this with no clear cause. Cannot take antihistamines due to sedating effects.      PMHx:  Osteoporosis: Severe. Last DEXA was in 2021. Continue alendronate.  Has had  compression fractures in the past.   Graves Disease: On MMI. Followed by endo. Symptoms and labs have been stable.   HTN: on triamtere-hctz and amlodipine     She has 6 children and just had another grand child and great grandchild!       Review of Systems   Constitutional:  Negative for activity change, appetite change and chills.   HENT:  Negative for ear pain, sinus pressure/congestion and sneezing.    Respiratory:  Negative for cough and shortness of breath.    Cardiovascular:  Negative for chest pain, palpitations and leg swelling.   Gastrointestinal:  Negative for abdominal distention, abdominal pain, constipation, diarrhea, nausea and vomiting.   Genitourinary:  Negative for dysuria and hematuria.   Musculoskeletal:  Negative for arthralgias, back pain and myalgias.   Neurological:  Negative for dizziness and headaches.   Psychiatric/Behavioral:  Negative for agitation. The patient is not nervous/anxious.          Past Medical History:   Diagnosis Date    Anxiety     Essential hypertension 1/8/2018    Graves disease     Hypertension     Kidney stones 12/3/2015     Past Surgical History:    Procedure Laterality Date    BACK SURGERY      x3     SECTION, CLASSIC       last     FRACTURE SURGERY      compression fracture    SPINE SURGERY      THYROID SURGERY        Patient Active Problem List   Diagnosis    BRIAN generalized anxiety disorder    Benign positional vertigo    Carotid bruit    Chronic low back pain    Lumbar spinal stenosis    Panic attacks    Asymptomatic gallstones    Cyst of meniscus of left knee, 2014.    History of back surgery, 2014 Dr. 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     Osteoporosis, post-menopausal    Postmenopausal status    Essential hypertension    Idiopathic peripheral neuropathy, since , back surgery    Atherosclerosis of aorta    Recurrent UTI    Compression fracture of T12 vertebra, s/p kyphoplasty     Sensation of lump in throat    Former smoker    Compression fracture of T12 vertebra with routine healing    Nocturia, x 3-4     Weight loss, non-intentional, anxiety related    Carotid arterial disease    Graves disease    Intense itching since 2021    At risk for falling    Frequent falls    Senile purpura        Objective:      Physical Exam  Constitutional:       Appearance: Normal appearance.   HENT:      Head: Normocephalic.      Right Ear: Tympanic membrane normal.      Left Ear: Tympanic membrane normal.      Nose: Nose normal.      Mouth/Throat:      Mouth: Mucous membranes are dry.   Cardiovascular:      Rate and Rhythm: Normal rate and regular rhythm.      Pulses: Normal pulses.      Heart sounds: Normal heart sounds.   Pulmonary:      Effort: Pulmonary effort is normal.      Breath sounds: Normal breath sounds.   Abdominal:      General: Abdomen is flat. Bowel sounds are normal.      Palpations: Abdomen is soft.   Musculoskeletal:         General: Normal range of motion.      Cervical back: Normal range of motion and neck supple.   Skin:     General: Skin is  warm and dry.   Neurological:      General: No focal deficit present.      Mental Status: She is alert and oriented to person, place, and time.   Psychiatric:         Mood and Affect: Mood normal.       Assessment:       Problem List Items Addressed This Visit          Psychiatric    BRIAN generalized anxiety disorder       Cardiac/Vascular    Atherosclerosis of aorta       Hematology    Senile purpura     Other Visit Diagnoses       Encounter to establish care    -  Primary    Relevant Orders    CBC W/ AUTO DIFFERENTIAL    Iron and TIBC    Lipid panel    Anemia, unspecified type        Relevant Orders    CBC W/ AUTO DIFFERENTIAL    Iron and TIBC    Lipid panel    Hypertension, unspecified type        Relevant Orders    Lipid panel            Plan:         Courtney was seen today for establish care.    Diagnoses and all orders for this visit:    Encounter to establish care      Anemia, unspecified type  Order CBC and iron to be done before next appt     Generalized anxiety disorder  -she is currently taking 5 mg of lexapro. Will increase to 10 mg daily and see if this helps with her anxiety if not will consider switching back to Paxil      Hypertension, unspecified type  Will stop Triamtere-hctz as she is having frequent urination at night and will increase amlodipine to 5 mg daily.      Senile purpura  Seen on exam. No bleeding issues.     Atherosclerosis of aorta  -seen on CT abdomen form 2021   -monitor Lipids and blood pressure           Follow up in 2 months    Jenny Arizmendi MD   Internal Medicine   Primary Care Physician

## 2022-08-30 ENCOUNTER — PES CALL (OUTPATIENT)
Dept: ADMINISTRATIVE | Facility: CLINIC | Age: 87
End: 2022-08-30
Payer: MEDICARE

## 2022-08-30 ENCOUNTER — PATIENT MESSAGE (OUTPATIENT)
Dept: INTERNAL MEDICINE | Facility: CLINIC | Age: 87
End: 2022-08-30
Payer: MEDICARE

## 2022-08-31 ENCOUNTER — PATIENT MESSAGE (OUTPATIENT)
Dept: INTERNAL MEDICINE | Facility: CLINIC | Age: 87
End: 2022-08-31
Payer: MEDICARE

## 2022-09-01 ENCOUNTER — PATIENT MESSAGE (OUTPATIENT)
Dept: INTERNAL MEDICINE | Facility: CLINIC | Age: 87
End: 2022-09-01
Payer: MEDICARE

## 2022-09-01 RX ORDER — PAROXETINE HYDROCHLORIDE 20 MG/1
20 TABLET, FILM COATED ORAL EVERY MORNING
Qty: 30 TABLET | Refills: 11 | Status: SHIPPED | OUTPATIENT
Start: 2022-09-01 | End: 2022-11-08

## 2022-09-09 ENCOUNTER — PATIENT MESSAGE (OUTPATIENT)
Dept: INTERNAL MEDICINE | Facility: CLINIC | Age: 87
End: 2022-09-09
Payer: MEDICARE

## 2022-09-09 DIAGNOSIS — R35.0 URINARY FREQUENCY: Primary | ICD-10-CM

## 2022-09-13 ENCOUNTER — PATIENT MESSAGE (OUTPATIENT)
Dept: INTERNAL MEDICINE | Facility: CLINIC | Age: 87
End: 2022-09-13
Payer: MEDICARE

## 2022-10-03 ENCOUNTER — PATIENT MESSAGE (OUTPATIENT)
Dept: OTHER | Facility: OTHER | Age: 87
End: 2022-10-03
Payer: MEDICARE

## 2022-10-10 ENCOUNTER — LAB VISIT (OUTPATIENT)
Dept: LAB | Facility: HOSPITAL | Age: 87
End: 2022-10-10
Attending: INTERNAL MEDICINE
Payer: MEDICARE

## 2022-10-10 DIAGNOSIS — D64.9 ANEMIA, UNSPECIFIED TYPE: ICD-10-CM

## 2022-10-10 DIAGNOSIS — Z76.89 ENCOUNTER TO ESTABLISH CARE: ICD-10-CM

## 2022-10-10 DIAGNOSIS — I10 HYPERTENSION, UNSPECIFIED TYPE: ICD-10-CM

## 2022-10-10 LAB
BASOPHILS # BLD AUTO: 0.04 K/UL (ref 0–0.2)
BASOPHILS NFR BLD: 0.8 % (ref 0–1.9)
CHOLEST SERPL-MCNC: 202 MG/DL (ref 120–199)
CHOLEST/HDLC SERPL: 3 {RATIO} (ref 2–5)
DIFFERENTIAL METHOD: ABNORMAL
EOSINOPHIL # BLD AUTO: 0.2 K/UL (ref 0–0.5)
EOSINOPHIL NFR BLD: 4 % (ref 0–8)
ERYTHROCYTE [DISTWIDTH] IN BLOOD BY AUTOMATED COUNT: 16.8 % (ref 11.5–14.5)
HCT VFR BLD AUTO: 38.3 % (ref 37–48.5)
HDLC SERPL-MCNC: 67 MG/DL (ref 40–75)
HDLC SERPL: 33.2 % (ref 20–50)
HGB BLD-MCNC: 11.8 G/DL (ref 12–16)
IMM GRANULOCYTES # BLD AUTO: 0.01 K/UL (ref 0–0.04)
IMM GRANULOCYTES NFR BLD AUTO: 0.2 % (ref 0–0.5)
IRON SERPL-MCNC: 96 UG/DL (ref 30–160)
LDLC SERPL CALC-MCNC: 121.6 MG/DL (ref 63–159)
LYMPHOCYTES # BLD AUTO: 1.6 K/UL (ref 1–4.8)
LYMPHOCYTES NFR BLD: 32.4 % (ref 18–48)
MCH RBC QN AUTO: 27.8 PG (ref 27–31)
MCHC RBC AUTO-ENTMCNC: 30.8 G/DL (ref 32–36)
MCV RBC AUTO: 90 FL (ref 82–98)
MONOCYTES # BLD AUTO: 0.8 K/UL (ref 0.3–1)
MONOCYTES NFR BLD: 15.5 % (ref 4–15)
NEUTROPHILS # BLD AUTO: 2.3 K/UL (ref 1.8–7.7)
NEUTROPHILS NFR BLD: 47.1 % (ref 38–73)
NONHDLC SERPL-MCNC: 135 MG/DL
NRBC BLD-RTO: 0 /100 WBC
PLATELET # BLD AUTO: 235 K/UL (ref 150–450)
PMV BLD AUTO: 11.9 FL (ref 9.2–12.9)
RBC # BLD AUTO: 4.25 M/UL (ref 4–5.4)
SATURATED IRON: 25 % (ref 20–50)
TOTAL IRON BINDING CAPACITY: 386 UG/DL (ref 250–450)
TRANSFERRIN SERPL-MCNC: 261 MG/DL (ref 200–375)
TRIGL SERPL-MCNC: 67 MG/DL (ref 30–150)
WBC # BLD AUTO: 4.97 K/UL (ref 3.9–12.7)

## 2022-10-10 PROCEDURE — 85025 COMPLETE CBC W/AUTO DIFF WBC: CPT | Performed by: INTERNAL MEDICINE

## 2022-10-10 PROCEDURE — 84466 ASSAY OF TRANSFERRIN: CPT | Performed by: INTERNAL MEDICINE

## 2022-10-10 PROCEDURE — 80061 LIPID PANEL: CPT | Performed by: INTERNAL MEDICINE

## 2022-10-10 PROCEDURE — 36415 COLL VENOUS BLD VENIPUNCTURE: CPT | Performed by: INTERNAL MEDICINE

## 2022-10-18 ENCOUNTER — OFFICE VISIT (OUTPATIENT)
Dept: INTERNAL MEDICINE | Facility: CLINIC | Age: 87
End: 2022-10-18
Payer: MEDICARE

## 2022-10-18 ENCOUNTER — LAB VISIT (OUTPATIENT)
Dept: LAB | Facility: HOSPITAL | Age: 87
End: 2022-10-18
Attending: INTERNAL MEDICINE
Payer: MEDICARE

## 2022-10-18 ENCOUNTER — PATIENT MESSAGE (OUTPATIENT)
Dept: INTERNAL MEDICINE | Facility: CLINIC | Age: 87
End: 2022-10-18

## 2022-10-18 ENCOUNTER — IMMUNIZATION (OUTPATIENT)
Dept: INTERNAL MEDICINE | Facility: CLINIC | Age: 87
End: 2022-10-18
Payer: MEDICARE

## 2022-10-18 VITALS
HEIGHT: 61 IN | HEART RATE: 78 BPM | SYSTOLIC BLOOD PRESSURE: 136 MMHG | OXYGEN SATURATION: 98 % | DIASTOLIC BLOOD PRESSURE: 70 MMHG | WEIGHT: 133.81 LBS | BODY MASS INDEX: 25.27 KG/M2

## 2022-10-18 DIAGNOSIS — E05.00 GRAVES DISEASE: ICD-10-CM

## 2022-10-18 DIAGNOSIS — E05.00 GRAVES DISEASE: Primary | ICD-10-CM

## 2022-10-18 DIAGNOSIS — F41.1 GENERALIZED ANXIETY DISORDER: ICD-10-CM

## 2022-10-18 DIAGNOSIS — R06.02 SOB (SHORTNESS OF BREATH): ICD-10-CM

## 2022-10-18 PROBLEM — M48.50XA COMPRESSED SPINE FRACTURE: Status: ACTIVE | Noted: 2018-09-28

## 2022-10-18 LAB
T3 SERPL-MCNC: 87 NG/DL (ref 60–180)
T4 FREE SERPL-MCNC: 0.91 NG/DL (ref 0.71–1.51)
TSH SERPL DL<=0.005 MIU/L-ACNC: 0.63 UIU/ML (ref 0.4–4)

## 2022-10-18 PROCEDURE — 99213 PR OFFICE/OUTPT VISIT, EST, LEVL III, 20-29 MIN: ICD-10-PCS | Mod: 25,S$GLB,, | Performed by: INTERNAL MEDICINE

## 2022-10-18 PROCEDURE — 99999 PR PBB SHADOW E&M-EST. PATIENT-LVL III: ICD-10-PCS | Mod: PBBFAC,,, | Performed by: INTERNAL MEDICINE

## 2022-10-18 PROCEDURE — 84443 ASSAY THYROID STIM HORMONE: CPT | Performed by: INTERNAL MEDICINE

## 2022-10-18 PROCEDURE — 84439 ASSAY OF FREE THYROXINE: CPT | Performed by: INTERNAL MEDICINE

## 2022-10-18 PROCEDURE — 3288F FALL RISK ASSESSMENT DOCD: CPT | Mod: CPTII,S$GLB,, | Performed by: INTERNAL MEDICINE

## 2022-10-18 PROCEDURE — 1126F PR PAIN SEVERITY QUANTIFIED, NO PAIN PRESENT: ICD-10-PCS | Mod: CPTII,S$GLB,, | Performed by: INTERNAL MEDICINE

## 2022-10-18 PROCEDURE — 1101F PT FALLS ASSESS-DOCD LE1/YR: CPT | Mod: CPTII,S$GLB,, | Performed by: INTERNAL MEDICINE

## 2022-10-18 PROCEDURE — 36415 COLL VENOUS BLD VENIPUNCTURE: CPT | Performed by: INTERNAL MEDICINE

## 2022-10-18 PROCEDURE — 99213 OFFICE O/P EST LOW 20 MIN: CPT | Mod: 25,S$GLB,, | Performed by: INTERNAL MEDICINE

## 2022-10-18 PROCEDURE — 90694 FLU VACCINE - QUADRIVALENT - ADJUVANTED: ICD-10-PCS | Mod: S$GLB,,, | Performed by: INTERNAL MEDICINE

## 2022-10-18 PROCEDURE — G0008 FLU VACCINE - QUADRIVALENT - ADJUVANTED: ICD-10-PCS | Mod: S$GLB,,, | Performed by: INTERNAL MEDICINE

## 2022-10-18 PROCEDURE — 3288F PR FALLS RISK ASSESSMENT DOCUMENTED: ICD-10-PCS | Mod: CPTII,S$GLB,, | Performed by: INTERNAL MEDICINE

## 2022-10-18 PROCEDURE — 90694 VACC AIIV4 NO PRSRV 0.5ML IM: CPT | Mod: S$GLB,,, | Performed by: INTERNAL MEDICINE

## 2022-10-18 PROCEDURE — 99999 PR PBB SHADOW E&M-EST. PATIENT-LVL III: CPT | Mod: PBBFAC,,, | Performed by: INTERNAL MEDICINE

## 2022-10-18 PROCEDURE — 1126F AMNT PAIN NOTED NONE PRSNT: CPT | Mod: CPTII,S$GLB,, | Performed by: INTERNAL MEDICINE

## 2022-10-18 PROCEDURE — 1101F PR PT FALLS ASSESS DOC 0-1 FALLS W/OUT INJ PAST YR: ICD-10-PCS | Mod: CPTII,S$GLB,, | Performed by: INTERNAL MEDICINE

## 2022-10-18 PROCEDURE — 84480 ASSAY TRIIODOTHYRONINE (T3): CPT | Performed by: INTERNAL MEDICINE

## 2022-10-18 PROCEDURE — G0008 ADMIN INFLUENZA VIRUS VAC: HCPCS | Mod: S$GLB,,, | Performed by: INTERNAL MEDICINE

## 2022-10-18 NOTE — PROGRESS NOTES
Subjective:       Patient ID: Courtney Plummer is a 87 y.o. female.    Chief Complaint: Follow-up    HPI    Mrs. Plummer is a very pleasant 88 yo female who presents for follow up.      During last visit we stopped her triamerene HCTZ due to frequent urination. Her BP has remained stable and urination improved.      She had been having increased symptoms of her Generalized anxiety disorder with racing thoughts and mild hyperventilation episodes. She was taken of Paxil in November and started on lexapro 5 mg daily and during last visit she stated the Lexapro was not helping and she wished to go back on Paxil. She started Paxil 10 mg daily but did not like the way it made her feel so she has been slowly weaning herself of it by taking half tablet every other day.    She describes episodes of hyperventilation when she is alone in her home related to getting things ready. Does not experience SOB or palpation when she is out with people or walking for exercise.      PMHx:  Osteoporosis: Severe. Last DEXA was in 2021. Continue alendronate.  Has had  compression fractures in the past.   Graves Disease: On MMI. Followed by endo. Symptoms and labs have been stable.   HTN: on triamtere-hctz and amlodipine   Anemia: stable on last labs checked. Iron levels normal.   Pruritis: She had diffuse pruritis for the last year. She has seen Allergy, dermatology and endo for this with no clear cause. Cannot take antihistamines due to sedating effects.      She has 6 children and just had another grand child and great grandchild!         Review of Systems   Constitutional:  Negative for activity change, appetite change and chills.   HENT:  Negative for ear pain, sinus pressure/congestion and sneezing.    Respiratory:  Negative for cough and shortness of breath.    Cardiovascular:  Negative for chest pain, palpitations and leg swelling.   Gastrointestinal:  Negative for abdominal distention, abdominal pain, constipation, diarrhea, nausea  and vomiting.   Genitourinary:  Negative for dysuria and hematuria.   Musculoskeletal:  Negative for arthralgias, back pain and myalgias.   Neurological:  Negative for dizziness and headaches.   Psychiatric/Behavioral:  Negative for agitation. The patient is nervous/anxious.          Past Medical History:   Diagnosis Date    Anxiety     Essential hypertension 2018    Graves disease     Hypertension     Kidney stones 12/3/2015     Past Surgical History:   Procedure Laterality Date    BACK SURGERY      x3     SECTION, CLASSIC       last     FRACTURE SURGERY      compression fracture    SPINE SURGERY      THYROID SURGERY        Patient Active Problem List   Diagnosis    BRIAN generalized anxiety disorder    Benign positional vertigo    Carotid bruit    Chronic low back pain    Lumbar spinal stenosis    Panic attacks    Asymptomatic gallstones    Cyst of meniscus of left knee, 2014.    History of back surgery, 2014 Dr. 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     Osteoporosis, post-menopausal    Postmenopausal status    Essential hypertension    Idiopathic peripheral neuropathy, since , back surgery    Atherosclerosis of aorta    Recurrent UTI    Compression fracture of T12 vertebra, s/p kyphoplasty     Sensation of lump in throat    Former smoker    Compression fracture of T12 vertebra with routine healing    Nocturia, x 3-4     Weight loss, non-intentional, anxiety related    Carotid arterial disease    Graves disease    Intense itching since 2021    At risk for falling    Frequent falls    Senile purpura    Compressed spine fracture        Objective:      Physical Exam  Constitutional:       Appearance: Normal appearance.   HENT:      Head: Normocephalic.      Right Ear: Tympanic membrane normal.      Left Ear: Tympanic membrane normal.      Nose: Nose normal.   Cardiovascular:      Rate and Rhythm: Normal rate and  regular rhythm.      Pulses: Normal pulses.      Heart sounds: Normal heart sounds.   Pulmonary:      Effort: Pulmonary effort is normal.      Breath sounds: Normal breath sounds.   Abdominal:      General: Abdomen is flat. Bowel sounds are normal.      Palpations: Abdomen is soft.   Musculoskeletal:         General: Normal range of motion.      Cervical back: Normal range of motion and neck supple.   Skin:     General: Skin is warm and dry.   Neurological:      General: No focal deficit present.      Mental Status: She is alert and oriented to person, place, and time.   Psychiatric:         Mood and Affect: Mood normal.       Assessment:       Problem List Items Addressed This Visit          Psychiatric    BRIAN generalized anxiety disorder       Endocrine    Graves disease - Primary    Relevant Orders    TSH    T4, FREE    T3     Other Visit Diagnoses       SOB (shortness of breath)        Relevant Orders    X-Ray Chest PA And Lateral    EKG 12-lead              Plan:         Courtney was seen today for follow-up.    Diagnoses and all orders for this visit:    Graves disease  -     TSH; Future  -     T4, FREE; Future  -     T3; Future    SOB (shortness of breath)  Maybe due to anxiety as she does not have these episodes when she around people. Will check CXR and EKG to rule out other causes.   Wean of Paxil. Consider alternative medications if symptoms worsen.     Follow up in 3 months.             Jenny Arizmendi MD   Internal Medicine   Primary Care

## 2022-10-24 ENCOUNTER — HOSPITAL ENCOUNTER (OUTPATIENT)
Dept: CARDIOLOGY | Facility: CLINIC | Age: 87
Discharge: HOME | End: 2022-10-24
Payer: MEDICARE

## 2022-10-24 ENCOUNTER — HOSPITAL ENCOUNTER (OUTPATIENT)
Dept: RADIOLOGY | Facility: HOSPITAL | Age: 87
Discharge: HOME | End: 2022-10-24
Attending: INTERNAL MEDICINE
Payer: MEDICARE

## 2022-10-24 DIAGNOSIS — R06.02 SOB (SHORTNESS OF BREATH): ICD-10-CM

## 2022-10-24 PROCEDURE — 71046 XR CHEST PA AND LATERAL: ICD-10-PCS | Mod: 26,,, | Performed by: RADIOLOGY

## 2022-10-24 PROCEDURE — 71046 X-RAY EXAM CHEST 2 VIEWS: CPT | Mod: 26,,, | Performed by: RADIOLOGY

## 2022-10-24 PROCEDURE — 93010 ELECTROCARDIOGRAM REPORT: CPT | Mod: S$GLB,,, | Performed by: INTERNAL MEDICINE

## 2022-10-24 PROCEDURE — 93005 ELECTROCARDIOGRAM TRACING: CPT | Mod: S$GLB,,, | Performed by: INTERNAL MEDICINE

## 2022-10-24 PROCEDURE — 71046 X-RAY EXAM CHEST 2 VIEWS: CPT | Mod: TC

## 2022-10-24 PROCEDURE — 93005 EKG 12-LEAD: ICD-10-PCS | Mod: S$GLB,,, | Performed by: INTERNAL MEDICINE

## 2022-10-24 PROCEDURE — 93010 EKG 12-LEAD: ICD-10-PCS | Mod: S$GLB,,, | Performed by: INTERNAL MEDICINE

## 2022-10-25 ENCOUNTER — PATIENT MESSAGE (OUTPATIENT)
Dept: INTERNAL MEDICINE | Facility: CLINIC | Age: 87
End: 2022-10-25
Payer: MEDICARE

## 2022-10-26 ENCOUNTER — PES CALL (OUTPATIENT)
Dept: ADMINISTRATIVE | Facility: CLINIC | Age: 87
End: 2022-10-26
Payer: MEDICARE

## 2022-11-03 ENCOUNTER — PATIENT MESSAGE (OUTPATIENT)
Dept: INTERNAL MEDICINE | Facility: CLINIC | Age: 87
End: 2022-11-03
Payer: MEDICARE

## 2022-11-08 ENCOUNTER — LAB VISIT (OUTPATIENT)
Dept: LAB | Facility: HOSPITAL | Age: 87
End: 2022-11-08
Payer: MEDICARE

## 2022-11-08 ENCOUNTER — OFFICE VISIT (OUTPATIENT)
Dept: INTERNAL MEDICINE | Facility: CLINIC | Age: 87
End: 2022-11-08
Payer: MEDICARE

## 2022-11-08 VITALS
BODY MASS INDEX: 24.72 KG/M2 | WEIGHT: 130.94 LBS | DIASTOLIC BLOOD PRESSURE: 78 MMHG | SYSTOLIC BLOOD PRESSURE: 128 MMHG | OXYGEN SATURATION: 96 % | HEART RATE: 92 BPM | HEIGHT: 61 IN

## 2022-11-08 DIAGNOSIS — R42 DIZZINESS AND GIDDINESS: ICD-10-CM

## 2022-11-08 DIAGNOSIS — R42 DIZZINESS: ICD-10-CM

## 2022-11-08 DIAGNOSIS — R42 DIZZINESS: Primary | ICD-10-CM

## 2022-11-08 LAB
ALBUMIN SERPL BCP-MCNC: 4.1 G/DL (ref 3.5–5.2)
ALP SERPL-CCNC: 63 U/L (ref 55–135)
ALT SERPL W/O P-5'-P-CCNC: 13 U/L (ref 10–44)
ANION GAP SERPL CALC-SCNC: 8 MMOL/L (ref 8–16)
AST SERPL-CCNC: 18 U/L (ref 10–40)
BASOPHILS # BLD AUTO: 0.04 K/UL (ref 0–0.2)
BASOPHILS NFR BLD: 0.3 % (ref 0–1.9)
BILIRUB SERPL-MCNC: 0.7 MG/DL (ref 0.1–1)
BNP SERPL-MCNC: 87 PG/ML (ref 0–99)
BUN SERPL-MCNC: 19 MG/DL (ref 8–23)
CALCIUM SERPL-MCNC: 9.2 MG/DL (ref 8.7–10.5)
CHLORIDE SERPL-SCNC: 106 MMOL/L (ref 95–110)
CO2 SERPL-SCNC: 23 MMOL/L (ref 23–29)
CREAT SERPL-MCNC: 0.8 MG/DL (ref 0.5–1.4)
DIFFERENTIAL METHOD: ABNORMAL
EOSINOPHIL # BLD AUTO: 0.1 K/UL (ref 0–0.5)
EOSINOPHIL NFR BLD: 0.7 % (ref 0–8)
ERYTHROCYTE [DISTWIDTH] IN BLOOD BY AUTOMATED COUNT: 17 % (ref 11.5–14.5)
EST. GFR  (NO RACE VARIABLE): >60 ML/MIN/1.73 M^2
GLUCOSE SERPL-MCNC: 84 MG/DL (ref 70–110)
HCT VFR BLD AUTO: 40 % (ref 37–48.5)
HGB BLD-MCNC: 12.4 G/DL (ref 12–16)
IMM GRANULOCYTES # BLD AUTO: 0.06 K/UL (ref 0–0.04)
IMM GRANULOCYTES NFR BLD AUTO: 0.4 % (ref 0–0.5)
LYMPHOCYTES # BLD AUTO: 0.9 K/UL (ref 1–4.8)
LYMPHOCYTES NFR BLD: 5.8 % (ref 18–48)
MCH RBC QN AUTO: 27.6 PG (ref 27–31)
MCHC RBC AUTO-ENTMCNC: 31 G/DL (ref 32–36)
MCV RBC AUTO: 89 FL (ref 82–98)
MONOCYTES # BLD AUTO: 1 K/UL (ref 0.3–1)
MONOCYTES NFR BLD: 6.7 % (ref 4–15)
NEUTROPHILS # BLD AUTO: 12.7 K/UL (ref 1.8–7.7)
NEUTROPHILS NFR BLD: 86.1 % (ref 38–73)
NRBC BLD-RTO: 0 /100 WBC
PLATELET # BLD AUTO: 248 K/UL (ref 150–450)
PMV BLD AUTO: 11.7 FL (ref 9.2–12.9)
POTASSIUM SERPL-SCNC: 4.2 MMOL/L (ref 3.5–5.1)
PROT SERPL-MCNC: 7.1 G/DL (ref 6–8.4)
RBC # BLD AUTO: 4.5 M/UL (ref 4–5.4)
SODIUM SERPL-SCNC: 137 MMOL/L (ref 136–145)
WBC # BLD AUTO: 14.78 K/UL (ref 3.9–12.7)

## 2022-11-08 PROCEDURE — 99999 PR PBB SHADOW E&M-EST. PATIENT-LVL III: CPT | Mod: PBBFAC,,, | Performed by: INTERNAL MEDICINE

## 2022-11-08 PROCEDURE — 1126F PR PAIN SEVERITY QUANTIFIED, NO PAIN PRESENT: ICD-10-PCS | Mod: CPTII,S$GLB,, | Performed by: INTERNAL MEDICINE

## 2022-11-08 PROCEDURE — 1101F PT FALLS ASSESS-DOCD LE1/YR: CPT | Mod: CPTII,S$GLB,, | Performed by: INTERNAL MEDICINE

## 2022-11-08 PROCEDURE — 80053 COMPREHEN METABOLIC PANEL: CPT | Performed by: INTERNAL MEDICINE

## 2022-11-08 PROCEDURE — 1101F PR PT FALLS ASSESS DOC 0-1 FALLS W/OUT INJ PAST YR: ICD-10-PCS | Mod: CPTII,S$GLB,, | Performed by: INTERNAL MEDICINE

## 2022-11-08 PROCEDURE — 83880 ASSAY OF NATRIURETIC PEPTIDE: CPT | Performed by: INTERNAL MEDICINE

## 2022-11-08 PROCEDURE — 3288F PR FALLS RISK ASSESSMENT DOCUMENTED: ICD-10-PCS | Mod: CPTII,S$GLB,, | Performed by: INTERNAL MEDICINE

## 2022-11-08 PROCEDURE — 99213 PR OFFICE/OUTPT VISIT, EST, LEVL III, 20-29 MIN: ICD-10-PCS | Mod: S$GLB,,, | Performed by: INTERNAL MEDICINE

## 2022-11-08 PROCEDURE — 99213 OFFICE O/P EST LOW 20 MIN: CPT | Mod: S$GLB,,, | Performed by: INTERNAL MEDICINE

## 2022-11-08 PROCEDURE — 85025 COMPLETE CBC W/AUTO DIFF WBC: CPT | Performed by: INTERNAL MEDICINE

## 2022-11-08 PROCEDURE — 1126F AMNT PAIN NOTED NONE PRSNT: CPT | Mod: CPTII,S$GLB,, | Performed by: INTERNAL MEDICINE

## 2022-11-08 PROCEDURE — 36415 COLL VENOUS BLD VENIPUNCTURE: CPT | Performed by: INTERNAL MEDICINE

## 2022-11-08 PROCEDURE — 99999 PR PBB SHADOW E&M-EST. PATIENT-LVL III: ICD-10-PCS | Mod: PBBFAC,,, | Performed by: INTERNAL MEDICINE

## 2022-11-08 PROCEDURE — 3288F FALL RISK ASSESSMENT DOCD: CPT | Mod: CPTII,S$GLB,, | Performed by: INTERNAL MEDICINE

## 2022-11-09 ENCOUNTER — HOSPITAL ENCOUNTER (OUTPATIENT)
Dept: CARDIOLOGY | Facility: HOSPITAL | Age: 87
Discharge: HOME | End: 2022-11-09
Attending: INTERNAL MEDICINE
Payer: MEDICARE

## 2022-11-09 VITALS
HEART RATE: 75 BPM | BODY MASS INDEX: 24.55 KG/M2 | HEIGHT: 61 IN | SYSTOLIC BLOOD PRESSURE: 125 MMHG | WEIGHT: 130 LBS | DIASTOLIC BLOOD PRESSURE: 70 MMHG

## 2022-11-09 DIAGNOSIS — R42 DIZZINESS: ICD-10-CM

## 2022-11-09 LAB
ASCENDING AORTA: 2.61 CM
AV INDEX (PROSTH): 0.85
AV MEAN GRADIENT: 3 MMHG
AV PEAK GRADIENT: 6 MMHG
AV VALVE AREA: 2.71 CM2
AV VELOCITY RATIO: 0.82
BSA FOR ECHO PROCEDURE: 1.59 M2
CV ECHO LV RWT: 0.39 CM
DOP CALC AO PEAK VEL: 1.21 M/S
DOP CALC AO VTI: 23.71 CM
DOP CALC LVOT AREA: 3.2 CM2
DOP CALC LVOT DIAMETER: 2.01 CM
DOP CALC LVOT PEAK VEL: 0.99 M/S
DOP CALC LVOT STROKE VOLUME: 64.25 CM3
DOP CALCLVOT PEAK VEL VTI: 20.26 CM
E WAVE DECELERATION TIME: 325.28 MSEC
E/A RATIO: 0.72
ECHO LV POSTERIOR WALL: 0.94 CM (ref 0.6–1.1)
EJECTION FRACTION: 65 %
FRACTIONAL SHORTENING: 36 % (ref 28–44)
HR MV ECHO: 76 BPM
INTERVENTRICULAR SEPTUM: 0.87 CM (ref 0.6–1.1)
IVRT: 99.9 MSEC
LA MAJOR: 5.15 CM
LA MINOR: 4.66 CM
LA WIDTH: 4.73 CM
LEFT ATRIUM SIZE: 3.97 CM
LEFT ATRIUM VOLUME INDEX MOD: 44.7 ML/M2
LEFT ATRIUM VOLUME INDEX: 49.7 ML/M2
LEFT ATRIUM VOLUME MOD: 70.19 CM3
LEFT ATRIUM VOLUME: 78.1 CM3
LEFT INTERNAL DIMENSION IN SYSTOLE: 3.11 CM (ref 2.1–4)
LEFT VENTRICLE DIASTOLIC VOLUME INDEX: 69.63 ML/M2
LEFT VENTRICLE DIASTOLIC VOLUME: 109.32 ML
LEFT VENTRICLE MASS INDEX: 96 G/M2
LEFT VENTRICLE SYSTOLIC VOLUME INDEX: 24.3 ML/M2
LEFT VENTRICLE SYSTOLIC VOLUME: 38.11 ML
LEFT VENTRICULAR INTERNAL DIMENSION IN DIASTOLE: 4.83 CM (ref 3.5–6)
LEFT VENTRICULAR MASS: 150.42 G
MV A" WAVE DURATION": 10.85 MSEC
MV MEAN GRADIENT: 3 MMHG
MV PEAK A VEL: 1.29 M/S
MV PEAK E VEL: 0.93 M/S
MV STENOSIS PRESSURE HALF TIME: 94.33 MS
MV VALVE AREA P 1/2 METHOD: 2.33 CM2
PISA TR MAX VEL: 2.3 M/S
PULM VEIN S/D RATIO: 1.18
PV PEAK D VEL: 0.34 M/S
PV PEAK S VEL: 0.4 M/S
RA MAJOR: 5.02 CM
RA PRESSURE: 3 MMHG
RA WIDTH: 4.14 CM
RIGHT VENTRICULAR END-DIASTOLIC DIMENSION: 3.47 CM
RV TISSUE DOPPLER FREE WALL SYSTOLIC VELOCITY 1 (APICAL 4 CHAMBER VIEW): 17.15 CM/S
SINUS: 2.5 CM
STJ: 2.25 CM
TR MAX PG: 21 MMHG
TRICUSPID ANNULAR PLANE SYSTOLIC EXCURSION: 2.16 CM
TV REST PULMONARY ARTERY PRESSURE: 24 MMHG

## 2022-11-09 PROCEDURE — 93306 TTE W/DOPPLER COMPLETE: CPT | Mod: 26,,, | Performed by: INTERNAL MEDICINE

## 2022-11-09 PROCEDURE — 93306 TTE W/DOPPLER COMPLETE: CPT

## 2022-11-09 PROCEDURE — 93306 ECHO (CUPID ONLY): ICD-10-PCS | Mod: 26,,, | Performed by: INTERNAL MEDICINE

## 2022-11-11 ENCOUNTER — PATIENT MESSAGE (OUTPATIENT)
Dept: INTERNAL MEDICINE | Facility: CLINIC | Age: 87
End: 2022-11-11
Payer: MEDICARE

## 2022-11-11 ENCOUNTER — HOSPITAL ENCOUNTER (OUTPATIENT)
Dept: RADIOLOGY | Facility: HOSPITAL | Age: 87
Discharge: HOME | End: 2022-11-11
Attending: INTERNAL MEDICINE
Payer: MEDICARE

## 2022-11-11 DIAGNOSIS — R42 DIZZINESS AND GIDDINESS: ICD-10-CM

## 2022-11-11 PROCEDURE — 70450 CT HEAD/BRAIN W/O DYE: CPT | Mod: 26,,, | Performed by: RADIOLOGY

## 2022-11-11 PROCEDURE — 70450 CT HEAD WITHOUT CONTRAST: ICD-10-PCS | Mod: 26,,, | Performed by: RADIOLOGY

## 2022-11-11 PROCEDURE — 70450 CT HEAD/BRAIN W/O DYE: CPT | Mod: TC

## 2022-11-14 ENCOUNTER — TELEPHONE (OUTPATIENT)
Dept: ENDOCRINOLOGY | Facility: CLINIC | Age: 87
End: 2022-11-14
Payer: MEDICARE

## 2022-11-14 ENCOUNTER — PATIENT MESSAGE (OUTPATIENT)
Dept: INTERNAL MEDICINE | Facility: CLINIC | Age: 87
End: 2022-11-14
Payer: MEDICARE

## 2022-11-29 ENCOUNTER — PES CALL (OUTPATIENT)
Dept: ADMINISTRATIVE | Facility: CLINIC | Age: 87
End: 2022-11-29
Payer: MEDICARE

## 2022-12-02 ENCOUNTER — TELEPHONE (OUTPATIENT)
Dept: ENDOCRINOLOGY | Facility: CLINIC | Age: 87
End: 2022-12-02
Payer: MEDICARE

## 2022-12-02 NOTE — TELEPHONE ENCOUNTER
----- Message from Emi Ritter sent at 12/1/2022 11:21 AM CST -----  Regarding: appt access  Contact: [phone]  Courtney Plummer calling regarding Appointment Access  (message) for Follow up 1 year with Dr Rock.  this is pt recall.  She prefer a Monday anytime or a Tuesday morning.   call back [phone]

## 2022-12-02 NOTE — TELEPHONE ENCOUNTER
Spoke with patient, states appointment scheduled isf ine with her.                            ----- Message from Emi Ritter sent at 12/1/2022 11:21 AM CST -----  Regarding: appt access  Contact: [phone]  Courtney Plummer calling regarding Appointment Access  (message) for Follow up 1 year with Dr Rock.  this is pt recall.  She prefer a Monday anytime or a Tuesday morning.   call back [phone]

## 2022-12-09 NOTE — PROGRESS NOTES
Subjective:       Patient ID: Courtney Plummer is a 87 y.o. female.    Chief Complaint: Dizziness    HPI    Mrs. Plummer is a 88 yo female who presents for dizziness and braing fog.     She states she fell backwards and hit her head 3 weeks ago and since that time she has felt a strange feeling in her head. She denies headache or blurry vision. She denies chest pain or palpitations. No shortness of breath.     PMHx:  Osteoporosis: Severe. Last DEXA was in . Continue alendronate.  Has had  compression fractures in the past.   Graves Disease: On MMI. Followed by endo. Symptoms and labs have been stable.   HTN: amlodipine and well controlled.    Anxiety: Was previously on Paxil but has since weaned off.     She has 6 children and just had another grand child and great grandchild!     Review of Systems   Constitutional:  Negative for activity change, appetite change and chills.   HENT:  Negative for ear pain, sinus pressure/congestion and sneezing.    Respiratory:  Negative for cough and shortness of breath.    Cardiovascular:  Negative for chest pain, palpitations and leg swelling.   Gastrointestinal:  Negative for abdominal distention, abdominal pain, constipation, diarrhea, nausea and vomiting.   Genitourinary:  Negative for dysuria and hematuria.   Musculoskeletal:  Negative for arthralgias, back pain and myalgias.   Neurological:  Positive for dizziness. Negative for headaches.   Psychiatric/Behavioral:  Negative for agitation. The patient is nervous/anxious.          Past Medical History:   Diagnosis Date    Anxiety     Essential hypertension 2018    Graves disease     Hypertension     Kidney stones 12/3/2015     Past Surgical History:   Procedure Laterality Date    BACK SURGERY      x3     SECTION, CLASSIC       last     FRACTURE SURGERY      compression fracture    SPINE SURGERY      THYROID SURGERY        Patient Active Problem List   Diagnosis    BRIAN generalized anxiety  disorder    Benign positional vertigo    Carotid bruit    Chronic low back pain    Lumbar spinal stenosis    Panic attacks    Asymptomatic gallstones    Cyst of meniscus of left knee, 04-.    History of back surgery, 09- Dr. 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 2021    At risk for falling    Frequent falls    Senile purpura    Compressed spine fracture        Objective:      Physical Exam  Constitutional:       Appearance: Normal appearance.   HENT:      Head: Normocephalic.      Right Ear: Tympanic membrane normal.      Left Ear: Tympanic membrane normal.      Nose: Nose normal.   Cardiovascular:      Rate and Rhythm: Normal rate and regular rhythm.      Pulses: Normal pulses.      Heart sounds: Normal heart sounds.   Pulmonary:      Effort: Pulmonary effort is normal.      Breath sounds: Normal breath sounds.   Abdominal:      General: Abdomen is flat. Bowel sounds are normal.      Palpations: Abdomen is soft.   Musculoskeletal:         General: Normal range of motion.      Cervical back: Normal range of motion and neck supple.   Skin:     General: Skin is warm and dry.   Neurological:      General: No focal deficit present.      Mental Status: She is alert and oriented to person, place, and time.   Psychiatric:         Mood and Affect: Mood normal.       Assessment:       Problem List Items Addressed This Visit    None  Visit Diagnoses        Dizziness    -  Primary    Relevant Orders    COMPREHENSIVE METABOLIC PANEL (Completed)    CBC W/ AUTO DIFFERENTIAL (Completed)    B-TYPE NATRIURETIC PEPTIDE (Completed)    Ambulatory referral/consult to Neurology    Echo (Completed)    Dizziness and giddiness        Relevant Orders    CT Head Without Contrast (Completed)              Plan:         Courtney was seen today for dizziness.    Diagnoses and all orders for this visit:    Dizziness  Check labs today   Check CT brain   Check 2D echo.   Neuro exam normal today                Jenny Arizmendi MD   Internal Medicine   Primary Care

## 2022-12-14 ENCOUNTER — OFFICE VISIT (OUTPATIENT)
Dept: NEUROLOGY | Facility: CLINIC | Age: 87
End: 2022-12-14
Payer: MEDICARE

## 2022-12-14 VITALS
HEART RATE: 75 BPM | BODY MASS INDEX: 23.74 KG/M2 | SYSTOLIC BLOOD PRESSURE: 132 MMHG | DIASTOLIC BLOOD PRESSURE: 71 MMHG | HEIGHT: 61 IN | WEIGHT: 125.75 LBS

## 2022-12-14 DIAGNOSIS — R42 DIZZINESS: ICD-10-CM

## 2022-12-14 PROCEDURE — 99204 OFFICE O/P NEW MOD 45 MIN: CPT | Mod: S$GLB,,, | Performed by: PSYCHIATRY & NEUROLOGY

## 2022-12-14 PROCEDURE — 99999 PR PBB SHADOW E&M-EST. PATIENT-LVL II: CPT | Mod: PBBFAC,,, | Performed by: PSYCHIATRY & NEUROLOGY

## 2022-12-14 PROCEDURE — 1125F AMNT PAIN NOTED PAIN PRSNT: CPT | Mod: CPTII,S$GLB,, | Performed by: PSYCHIATRY & NEUROLOGY

## 2022-12-14 PROCEDURE — 1101F PT FALLS ASSESS-DOCD LE1/YR: CPT | Mod: CPTII,S$GLB,, | Performed by: PSYCHIATRY & NEUROLOGY

## 2022-12-14 PROCEDURE — 1125F PR PAIN SEVERITY QUANTIFIED, PAIN PRESENT: ICD-10-PCS | Mod: CPTII,S$GLB,, | Performed by: PSYCHIATRY & NEUROLOGY

## 2022-12-14 PROCEDURE — 99999 PR PBB SHADOW E&M-EST. PATIENT-LVL II: ICD-10-PCS | Mod: PBBFAC,,, | Performed by: PSYCHIATRY & NEUROLOGY

## 2022-12-14 PROCEDURE — 3288F PR FALLS RISK ASSESSMENT DOCUMENTED: ICD-10-PCS | Mod: CPTII,S$GLB,, | Performed by: PSYCHIATRY & NEUROLOGY

## 2022-12-14 PROCEDURE — 3288F FALL RISK ASSESSMENT DOCD: CPT | Mod: CPTII,S$GLB,, | Performed by: PSYCHIATRY & NEUROLOGY

## 2022-12-14 PROCEDURE — 1101F PR PT FALLS ASSESS DOC 0-1 FALLS W/OUT INJ PAST YR: ICD-10-PCS | Mod: CPTII,S$GLB,, | Performed by: PSYCHIATRY & NEUROLOGY

## 2022-12-14 PROCEDURE — 99204 PR OFFICE/OUTPT VISIT, NEW, LEVL IV, 45-59 MIN: ICD-10-PCS | Mod: S$GLB,,, | Performed by: PSYCHIATRY & NEUROLOGY

## 2022-12-14 NOTE — PROGRESS NOTES
"  Courtney Plummer is a 87 y.o. year old female that  presents for evaluation of dizziness.    HPI:  Mrs Plummer has HTN, Graves disease, anxiety, neuropathy, and kidney stones.  She presents today with complain of a daily and rather constant " uneasy feeling in my head and some trouble with concentration " that developed right after a fall on  in which she struck her head but did not have loss of consciousness.   Stated that such sensation " is annoying, no getting better, but does not interfere with my ability to function".  Reports no associated memory loss, true vertigo or dizziness, visual disturbances, changes in her sleep pattern, or HA. No focal weakness, double vision, tremors, slurred speech, language or visual disturbances.  Endorses some worsening of her neuropathic symptoms, chiefly tingling and burning feet-legs that sometimes wakes her up from sleep.  CT head done 22 was independently reviewed today and showed no acute, subacute, or chronic abnormality that could explain her symptoms.      Past Medical History:   Diagnosis Date    Anxiety     Essential hypertension 2018    Graves disease     Hypertension     Kidney stones 12/3/2015     Social History     Socioeconomic History    Marital status:    Tobacco Use    Smoking status: Former     Packs/day: 1.00     Types: Cigarettes     Quit date: 1969     Years since quittin.9    Smokeless tobacco: Never   Substance and Sexual Activity    Alcohol use: No     Comment: occasionally    Drug use: No    Sexual activity: Not Currently   Social History Narrative    2014    She is adusting to being .    2 of her children have sided with her ex-.    She has no contact with her ex-husba.She has an xcellent relationship with her othe 4 children.    She is at a [employer] once a week.    For 35 years she has played bridge every other week with a group o 8 lady's.    She also plays serious bridge she " once weekly at the [facility] on [address]    She is now living inher own place.  It is located behind [facility] on [address].  It is called [facility]. Every night about 15-16 people gather for [employer].  She does nt drink.    Ever sinceher fall on 2013 she has had difficuly recovering.    Slowly, now she is getting epifanio on her feet     Social Determinants of Health     Financial Resource Strain: Low Risk     Difficulty of Paying Living Expenses: Not hard at all   Food Insecurity: No Food Insecurity    Worried About Running Out of Food in the Last Year: Never true    Ran Out of Food in the Last Year: Never true   Transportation Needs: No Transportation Needs    Lack of Transportation (Medical): No    Lack of Transportation (Non-Medical): No   Physical Activity: Sufficiently Active    Days of Exercise per Week: 5 days    Minutes of Exercise per Session: 30 min   Stress: Stress Concern Present    Feeling of Stress : Very much   Social Connections: Unknown    Frequency of Communication with Friends and Family: More than three times a week    Frequency of Social Gatherings with Friends and Family: More than three times a week    Active Member of Clubs or Organizations: Yes    Attends Club or Organization Meetings: More than 4 times per year    Marital Status:    Housing Stability: Low Risk     Unable to Pay for Housing in the Last Year: No    Number of Places Lived in the Last Year: 1    Unstable Housing in the Last Year: No     Past Surgical History:   Procedure Laterality Date    BACK SURGERY      x3     SECTION, CLASSIC       last     FRACTURE SURGERY      compression fracture    SPINE SURGERY      THYROID SURGERY       Family History   Problem Relation Age of Onset    Heart disease Father     Heart disease Brother     Cancer Brother         melanoma    No Known Problems Daughter     No Known Problems Son     No Known Problems Daughter     No Known  Problems Daughter     No Known Problems Son     No Known Problems Son     Breast cancer Neg Hx     Colon cancer Neg Hx     Ovarian cancer Neg Hx            Review of Systems  General ROS: negative for chills, fever or weight loss  Psychological ROS: negative for hallucination, depression or suicidal ideation  Ophthalmic ROS: negative for blurry vision, photophobia or eye pain  ENT ROS: negative for epistaxis, sore throat or rhinorrhea  Respiratory ROS: no cough, shortness of breath, or wheezing  Cardiovascular ROS: no chest pain or dyspnea on exertion  Gastrointestinal ROS: no abdominal pain, change in bowel habits, or black/ bloody stools  Genito-Urinary ROS: no dysuria, trouble voiding, or hematuria  Musculoskeletal ROS: negative for gait disturbance or muscular weakness  Neurological ROS: no syncope or seizures; no ataxia  Dermatological ROS: negative for pruritis, rash and jaundice      Physical Exam:  There were no vitals taken for this visit.  General appearance: alert, cooperative, no distress  Constitutional:Oriented to person, place, and time.appears well-developed and well-nourished.   HEENT: Normocephalic, atraumatic, neck symmetrical, no nasal discharge   Eyes: conjunctivae/corneas clear, PERRL, EOM's intact  Lungs: clear to auscultation bilaterally, no dullness to percussion bilaterally  Heart: regular rate and rhythm without rub; no displacement of the PMI   Abdomen: soft, non-tender; bowel sounds normoactive; no organomegaly  Extremities: extremities symmetric; no clubbing, cyanosis, or edema  Integument: Skin color, texture, turgor normal; no rashes; hair distrubution normal  Neurologic:   Mental status: alert and awake, oriented x 4, comprehension, naming, and repetition intact. No right to left confusion. Performs serial 7's without difficulty .No dysarthria.  CN 2-12: pupils 4 mm bilaterally, reactive to light. Fundi without papilledema. Visual fields full to confrontation. EOM full without  nystagmus. Face sensation normal in all distributions. Face symmetric. Hearing grossly intact. Palate elevates well. Tongue midline without atrophy or fasciculations.  Motor: 5/5 all over  Sensory: intact in all modalities.  DTR's: 2+ all over.  Plantars: no tested.  Coordination: finger to nose and heel-knee-shin intact.  Gait: no ataxia or bradykinesia     LABS:    Complete Blood Count  Lab Results   Component Value Date    RBC 4.50 11/08/2022    HGB 12.4 11/08/2022    HCT 40.0 11/08/2022    MCV 89 11/08/2022    MCH 27.6 11/08/2022    MCHC 31.0 (L) 11/08/2022    RDW 17.0 (H) 11/08/2022     11/08/2022    MPV 11.7 11/08/2022    GRAN 12.7 (H) 11/08/2022    GRAN 86.1 (H) 11/08/2022    LYMPH 0.9 (L) 11/08/2022    LYMPH 5.8 (L) 11/08/2022    MONO 1.0 11/08/2022    MONO 6.7 11/08/2022    EOS 0.1 11/08/2022    BASO 0.04 11/08/2022    EOSINOPHIL 0.7 11/08/2022    BASOPHIL 0.3 11/08/2022    DIFFMETHOD Automated 11/08/2022       Comprehensive Metabolic Panel  Lab Results   Component Value Date    GLU 84 11/08/2022    BUN 19 11/08/2022    CREATININE 0.8 11/08/2022     11/08/2022    K 4.2 11/08/2022     11/08/2022    PROT 7.1 11/08/2022    ALBUMIN 4.1 11/08/2022    BILITOT 0.7 11/08/2022    AST 18 11/08/2022    ALKPHOS 63 11/08/2022    CO2 23 11/08/2022    ALT 13 11/08/2022    ANIONGAP 8 11/08/2022    EGFRNONAA 58.5 (A) 12/07/2021    ESTGFRAFRICA >60.0 12/07/2021       TSH  Lab Results   Component Value Date    TSH 0.626 10/18/2022         Assessment: pleasant 86 y/o with HTN, Graves disease, anxiety, neuropathy, and kidney stones, presents for evaluation of likely mild post concussive syndrome.  Neuro exam is unimpressive.  CT head unremarkable.  Had and ample conversation with Mrs Perkins regarding the diagnosis, management, and prognosis of post concussive syndrome.  No further intervention required at this time, but will bring her to our neurology concussive program if symptoms don't resolve in the  following couple of months.              ICD-10-CM ICD-9-CM    1. Dizziness  R42 780.4 Ambulatory referral/consult to Neurology        The encounter diagnosis was Dizziness.      Plan:  1) Post concussive syndrome, mild: as above  2) HTN  3) Graves disease  4) Neuropathy  5) Anxiety  6) Kidney stones  No orders of the defined types were placed in this encounter.          Izaiah Nieves MD

## 2022-12-15 ENCOUNTER — PATIENT MESSAGE (OUTPATIENT)
Dept: NEUROLOGY | Facility: CLINIC | Age: 87
End: 2022-12-15
Payer: MEDICARE

## 2022-12-19 ENCOUNTER — PATIENT MESSAGE (OUTPATIENT)
Dept: INTERNAL MEDICINE | Facility: CLINIC | Age: 87
End: 2022-12-19
Payer: MEDICARE

## 2022-12-19 DIAGNOSIS — M81.0 POST-MENOPAUSAL OSTEOPOROSIS: ICD-10-CM

## 2022-12-19 RX ORDER — ALENDRONATE SODIUM 70 MG/1
TABLET ORAL
Qty: 12 TABLET | Refills: 0 | Status: SHIPPED | OUTPATIENT
Start: 2022-12-19 | End: 2023-03-28 | Stop reason: SDUPTHER

## 2022-12-28 ENCOUNTER — PATIENT MESSAGE (OUTPATIENT)
Dept: ADMINISTRATIVE | Facility: OTHER | Age: 87
End: 2022-12-28
Payer: MEDICARE

## 2023-01-04 ENCOUNTER — PATIENT MESSAGE (OUTPATIENT)
Dept: ADMINISTRATIVE | Facility: OTHER | Age: 88
End: 2023-01-04
Payer: MEDICARE

## 2023-01-09 ENCOUNTER — PATIENT MESSAGE (OUTPATIENT)
Dept: INTERNAL MEDICINE | Facility: CLINIC | Age: 88
End: 2023-01-09
Payer: MEDICARE

## 2023-01-12 RX ORDER — TRIAMTERENE/HYDROCHLOROTHIAZID 37.5-25 MG
0.5 TABLET ORAL DAILY
Qty: 45 TABLET | Refills: 3 | Status: SHIPPED | OUTPATIENT
Start: 2023-01-12 | End: 2023-03-27

## 2023-02-07 ENCOUNTER — PATIENT MESSAGE (OUTPATIENT)
Dept: ADMINISTRATIVE | Facility: OTHER | Age: 88
End: 2023-02-07
Payer: MEDICARE

## 2023-02-07 DIAGNOSIS — Z00.00 ENCOUNTER FOR MEDICARE ANNUAL WELLNESS EXAM: ICD-10-CM

## 2023-02-12 ENCOUNTER — PATIENT MESSAGE (OUTPATIENT)
Dept: INTERNAL MEDICINE | Facility: CLINIC | Age: 88
End: 2023-02-12
Payer: MEDICARE

## 2023-02-20 ENCOUNTER — HOSPITAL ENCOUNTER (EMERGENCY)
Facility: HOSPITAL | Age: 88
Discharge: HOME | End: 2023-02-20
Attending: EMERGENCY MEDICINE
Payer: MEDICARE

## 2023-02-20 VITALS
TEMPERATURE: 98 F | RESPIRATION RATE: 18 BRPM | OXYGEN SATURATION: 99 % | BODY MASS INDEX: 23.24 KG/M2 | HEART RATE: 71 BPM | WEIGHT: 123 LBS | SYSTOLIC BLOOD PRESSURE: 146 MMHG | DIASTOLIC BLOOD PRESSURE: 69 MMHG

## 2023-02-20 DIAGNOSIS — M54.9 BACK PAIN: Primary | ICD-10-CM

## 2023-02-20 LAB
BASOPHILS # BLD AUTO: 0.04 K/UL (ref 0–0.2)
BASOPHILS NFR BLD: 0.6 % (ref 0–1.9)
BUN SERPL-MCNC: 16 MG/DL (ref 6–30)
CHLORIDE SERPL-SCNC: 104 MMOL/L (ref 95–110)
CREAT SERPL-MCNC: 0.8 MG/DL (ref 0.5–1.4)
D DIMER PPP IA.FEU-MCNC: 0.19 MG/L FEU
DIFFERENTIAL METHOD: ABNORMAL
EOSINOPHIL # BLD AUTO: 0.1 K/UL (ref 0–0.5)
EOSINOPHIL NFR BLD: 1.1 % (ref 0–8)
ERYTHROCYTE [DISTWIDTH] IN BLOOD BY AUTOMATED COUNT: 17.7 % (ref 11.5–14.5)
GLUCOSE SERPL-MCNC: 91 MG/DL (ref 70–110)
HCT VFR BLD AUTO: 40.2 % (ref 37–48.5)
HCT VFR BLD CALC: 40 %PCV (ref 36–54)
HGB BLD-MCNC: 12.4 G/DL (ref 12–16)
IMM GRANULOCYTES # BLD AUTO: 0.01 K/UL (ref 0–0.04)
IMM GRANULOCYTES NFR BLD AUTO: 0.2 % (ref 0–0.5)
LYMPHOCYTES # BLD AUTO: 1.9 K/UL (ref 1–4.8)
LYMPHOCYTES NFR BLD: 30.4 % (ref 18–48)
MCH RBC QN AUTO: 27.7 PG (ref 27–31)
MCHC RBC AUTO-ENTMCNC: 30.8 G/DL (ref 32–36)
MCV RBC AUTO: 90 FL (ref 82–98)
MONOCYTES # BLD AUTO: 0.7 K/UL (ref 0.3–1)
MONOCYTES NFR BLD: 11.7 % (ref 4–15)
NEUTROPHILS # BLD AUTO: 3.5 K/UL (ref 1.8–7.7)
NEUTROPHILS NFR BLD: 56 % (ref 38–73)
NRBC BLD-RTO: 0 /100 WBC
PLATELET # BLD AUTO: 208 K/UL (ref 150–450)
PMV BLD AUTO: 11.1 FL (ref 9.2–12.9)
POC IONIZED CALCIUM: 1.09 MMOL/L (ref 1.06–1.42)
POC TCO2 (MEASURED): 25 MMOL/L (ref 23–29)
POTASSIUM BLD-SCNC: 3.7 MMOL/L (ref 3.5–5.1)
RBC # BLD AUTO: 4.47 M/UL (ref 4–5.4)
SAMPLE: NORMAL
SODIUM BLD-SCNC: 139 MMOL/L (ref 136–145)
WBC # BLD AUTO: 6.25 K/UL (ref 3.9–12.7)

## 2023-02-20 PROCEDURE — 85025 COMPLETE CBC W/AUTO DIFF WBC: CPT | Performed by: EMERGENCY MEDICINE

## 2023-02-20 PROCEDURE — 99284 PR EMERGENCY DEPT VISIT,LEVEL IV: ICD-10-PCS | Mod: ,,, | Performed by: EMERGENCY MEDICINE

## 2023-02-20 PROCEDURE — 80047 BASIC METABLC PNL IONIZED CA: CPT

## 2023-02-20 PROCEDURE — 99284 EMERGENCY DEPT VISIT MOD MDM: CPT | Mod: ,,, | Performed by: EMERGENCY MEDICINE

## 2023-02-20 PROCEDURE — 99284 EMERGENCY DEPT VISIT MOD MDM: CPT | Mod: 25

## 2023-02-20 PROCEDURE — 85379 FIBRIN DEGRADATION QUANT: CPT | Performed by: EMERGENCY MEDICINE

## 2023-02-20 NOTE — ED NOTES
I-STAT Chem-8+ Results:   Value Reference Range   Sodium 139 136-145 mmol/L   Potassium  3.7 3.5-5.1 mmol/L   Chloride 104  mmol/L   Ionized Calcium 1.09 1.06-1.42 mmol/L   CO2 (measured) 25 23-29 mmol/L   Glucose 91  mg/dL   BUN 16 6-30 mg/dL   Creatinine 0.8 0.5-1.4 mg/dL   Hematocrit 40 36-54%

## 2023-02-20 NOTE — ED PROVIDER NOTES
Encounter Date: 2023    SCRIBE #1 NOTE: IHaley, am scribing for, and in the presence of,  Sonal Suárez MD. I have scribed the following portions of the note - Other sections scribed: HPI, ROS, PE.     History     Chief Complaint   Patient presents with    Back Pain     Reports left sided back pain that started this AM. Denies injury.     Time patient was seen by the provider: 3:37 PM      The patient is a 87 y.o. female with past medical history of HTN who presents to the ED with a complaint of left sided mid back pain onset this AM. Described as a constant aching pain that dulls down but does not completely subside. Patient reports her pain is exacerbated with prolonged standing and sitting. It is not reproducible with touch. She is able to rotate her spine and perform shoulder abductions bilaterally without reproducing her back pain. Denies weakness, numbness, SOB, fever, chills, cough, and CP. She has since taken 2 tylenol extra strength without improvement to her pain. Pt has not had a recent fall or trauma. Last fall was reported 2022. Of note, the pt reports chronic leg swelling bilaterally however this has been unchanged from prior.    The history is provided by the patient and medical records. No  was used.   Review of patient's allergies indicates:   Allergen Reactions    Sulfa (sulfonamide antibiotics) Anaphylaxis    Macrobid [nitrofurantoin monohyd/m-cryst]      Fever, joint pain    Codeine Anxiety     Past Medical History:   Diagnosis Date    Anxiety     Essential hypertension 2018    Graves disease     Hypertension     Kidney stones 12/3/2015     Past Surgical History:   Procedure Laterality Date    BACK SURGERY      x3     SECTION, CLASSIC       last     FRACTURE SURGERY      compression fracture    SPINE SURGERY      THYROID SURGERY       Family History   Problem Relation Age of Onset    Heart disease Father     Heart disease  Brother     Cancer Brother         melanoma    No Known Problems Daughter     No Known Problems Son     No Known Problems Daughter     No Known Problems Daughter     No Known Problems Son     No Known Problems Son     Breast cancer Neg Hx     Colon cancer Neg Hx     Ovarian cancer Neg Hx      Social History     Tobacco Use    Smoking status: Former     Packs/day: 1.00     Types: Cigarettes     Quit date: 1969     Years since quittin.1    Smokeless tobacco: Never   Substance Use Topics    Alcohol use: No     Comment: occasionally    Drug use: No     Review of Systems   Constitutional:  Negative for chills and fever.   HENT:  Negative for sore throat.    Eyes:  Negative for visual disturbance.   Respiratory:  Negative for cough and shortness of breath.    Cardiovascular:  Negative for chest pain.   Gastrointestinal:  Negative for abdominal pain, diarrhea, nausea and vomiting.   Genitourinary:  Negative for dysuria.   Musculoskeletal:  Positive for back pain. Negative for neck pain.   Skin:  Negative for rash and wound.   Allergic/Immunologic: Negative for immunocompromised state.   Neurological:  Negative for syncope, weakness and numbness.   Psychiatric/Behavioral:  Negative for confusion.      Physical Exam     Initial Vitals [23 1324]   BP Pulse Resp Temp SpO2   (!) 178/93 94 16 98 °F (36.7 °C) 100 %      MAP       --         Physical Exam    Nursing note and vitals reviewed.  Constitutional: Vital signs are normal. She appears well-developed and well-nourished.  Non-toxic appearance. She does not appear ill.   HENT:   Head: Normocephalic and atraumatic.   Mouth/Throat: Mucous membranes are normal. Mucous membranes are not dry.   Eyes: Conjunctivae and lids are normal.   Neck: Neck supple.   Normal range of motion.  Cardiovascular:  Normal rate.           Musculoskeletal:      Cervical back: Normal range of motion and neck supple.      Thoracic back: No tenderness or bony tenderness.      Neurological: She is alert and oriented to person, place, and time.   Skin: Skin is dry and intact. No pallor.   Psychiatric: She has a normal mood and affect. Her speech is normal and behavior is normal.       ED Course   Procedures  Labs Reviewed   CBC W/ AUTO DIFFERENTIAL - Abnormal; Notable for the following components:       Result Value    MCHC 30.8 (*)     RDW 17.7 (*)     All other components within normal limits   D DIMER, QUANTITATIVE   ISTAT PROCEDURE          Imaging Results              X-Ray Thoracic Spine AP And Lateral (Final result)  Result time 02/20/23 16:58:16      Final result by Pasquale Newell MD (02/20/23 16:58:16)                   Impression:      1. No convincing acute displaced fracture or dislocation of the thoracic spine.      Electronically signed by: Pasquale Newell MD  Date:    02/20/2023  Time:    16:58               Narrative:    EXAMINATION:  XR THORACIC SPINE AP LATERAL    CLINICAL HISTORY:  Dorsalgia, unspecified    TECHNIQUE:  AP and lateral views of the thoracic spine were performed.    COMPARISON:  CT abdomen and pelvis 10/07/2021    FINDINGS:  Four views thoracic spine.    Lateral imaging demonstrates kyphotic curvature at the level of T12 noting vertebroplasty change, similar to the previous CT.  No significant interval vertebral body height loss.  There is multilevel disc space height loss.  The facet joints are aligned.  AP spinal alignment is grossly unremarkable.  The visualized lung zones are grossly clear, better evaluated on prior exam.                                       X-Ray Chest PA And Lateral (Final result)  Result time 02/20/23 16:55:23      Final result by Pasquale Newell MD (02/20/23 16:55:23)                   Impression:      1. Stable appearing chronic interstitial findings, correlation with any history of COPD/emphysema.      Electronically signed by: Pasquale Newell MD  Date:    02/20/2023  Time:    16:55               Narrative:     EXAMINATION:  XR CHEST PA AND LATERAL    CLINICAL HISTORY:  Dorsalgia, unspecified    TECHNIQUE:  PA and lateral views of the chest were performed.    COMPARISON:  10/24/2022    FINDINGS:  The cardiomediastinal silhouette is not enlarged noting calcification of the aorta..  There is no pleural effusion.  The trachea is midline.  The lungs are symmetrically expanded bilaterally with coarse interstitial attenuation, accentuated by overlying soft tissue..  No large focal consolidation seen.  There is no pneumothorax.  The osseous structures are remarkable for degenerative changes.  There is vertebroplasty change.  There is osteopenia..                                       Medications - No data to display  Medical Decision Making:   History:   Old Medical Records: I decided to obtain old medical records.  Initial Assessment:   Atraumatic left lateral thoracic back pain for 1 day  No shortness of breath or chest pain  No fevers, chills, infectious symptoms  No extremity motor deficits/complaint/sensory changes    Patient is well-appearing clinically with normal vital signs  I am unable to reproduce the pain on exam  Differential Diagnosis:   Back pain seems musculoskeletal in nature.  DDx would include muscle strain, herniated disc, sciatica.  There are currently no red flags present from H&P to suggest dangerous pathology like epidural abscess/discitis/osteomyelitis, spinal cord compression, fracture, malignancy or vascular emergency like AAA or aortic dissection and low suspicion for renal colic or pyelonephritis and therefore no indication at this time for emergent imaging.  On exam, I do not see a rash to suggest herpes zoster.  Other considerations given that the pain is relatively high in her back would include a pulmonary process like a PE, effusion, pneumonia.    Clinical Tests:   Lab Tests: Ordered and Reviewed  Radiological Study: Ordered and Reviewed  ED Management:  Given advanced age, will get x-rays to  assess for compression fracture  Chest x-ray to assess for pulmonary process  CBC, i-STAT, D-dimer  If ED workup is negative I do anticipate that she could go home with over-the-counter pain medication and continued outpatient workup given her overall well appearance, reassuring vital signs.        Scribe Attestation:   Scribe #1: I performed the above scribed service and the documentation accurately describes the services I performed. I attest to the accuracy of the note.      ED Course as of 02/21/23 0756   Mon Feb 20, 2023   1705 D-Dimer: 0.19 [AS]   1705 WBC: 6.25 [AS]   1705 Hemoglobin: 12.4 [AS]   1705 Hematocrit: 40.2 [AS]   1705 Xrays negative.  Will d/c home [AS]      ED Course User Index  [AS] Sonal Suárez MD               I, Dr. Sonal Suárez, personally performed the services described in this documentation. All medical record entries made by the scribe were at my direction and in my presence.  I have reviewed the chart and agree that the record reflects my personal performance and is accurate and complete. Sonal Suárez MD.  4:33 PM 02/21/2023    Clinical Impression:   Final diagnoses:  [M54.9] Back pain (Primary)        ED Disposition Condition    Discharge Stable          ED Prescriptions    None       Follow-up Information       Follow up With Specialties Details Why Contact Info    Jenny Arizmendi MD Internal Medicine Call in 2 days  [address]  [place] [address]  [phone]               Sonal Suárez MD  02/21/23 0754

## 2023-03-07 ENCOUNTER — PATIENT MESSAGE (OUTPATIENT)
Dept: INTERNAL MEDICINE | Facility: CLINIC | Age: 88
End: 2023-03-07
Payer: MEDICARE

## 2023-03-27 ENCOUNTER — PATIENT MESSAGE (OUTPATIENT)
Dept: PHARMACY | Facility: CLINIC | Age: 88
End: 2023-03-27
Payer: MEDICARE

## 2023-03-27 ENCOUNTER — OFFICE VISIT (OUTPATIENT)
Dept: INTERNAL MEDICINE | Facility: CLINIC | Age: 88
End: 2023-03-27
Payer: MEDICARE

## 2023-03-27 VITALS
HEART RATE: 77 BPM | HEIGHT: 61 IN | OXYGEN SATURATION: 98 % | WEIGHT: 127.88 LBS | DIASTOLIC BLOOD PRESSURE: 58 MMHG | BODY MASS INDEX: 24.15 KG/M2 | SYSTOLIC BLOOD PRESSURE: 126 MMHG

## 2023-03-27 DIAGNOSIS — Z00.00 ENCOUNTER FOR MEDICARE ANNUAL WELLNESS EXAM: Primary | ICD-10-CM

## 2023-03-27 DIAGNOSIS — I70.0 ATHEROSCLEROSIS OF AORTA: ICD-10-CM

## 2023-03-27 DIAGNOSIS — I77.9 CAROTID ARTERY DISEASE, UNSPECIFIED LATERALITY, UNSPECIFIED TYPE: ICD-10-CM

## 2023-03-27 DIAGNOSIS — G60.9 IDIOPATHIC PERIPHERAL NEUROPATHY: ICD-10-CM

## 2023-03-27 DIAGNOSIS — D69.2 SENILE PURPURA: ICD-10-CM

## 2023-03-27 DIAGNOSIS — Z23 NEED FOR SHINGLES VACCINE: ICD-10-CM

## 2023-03-27 DIAGNOSIS — Z00.00 ENCOUNTER FOR PREVENTIVE HEALTH EXAMINATION: ICD-10-CM

## 2023-03-27 PROCEDURE — 99999 PR PBB SHADOW E&M-EST. PATIENT-LVL V: ICD-10-PCS | Mod: PBBFAC,HCNC,, | Performed by: NURSE PRACTITIONER

## 2023-03-27 PROCEDURE — 1100F PTFALLS ASSESS-DOCD GE2>/YR: CPT | Mod: HCNC,CPTII,S$GLB, | Performed by: NURSE PRACTITIONER

## 2023-03-27 PROCEDURE — 1170F PR FUNCTIONAL STATUS ASSESSED: ICD-10-PCS | Mod: HCNC,CPTII,S$GLB, | Performed by: NURSE PRACTITIONER

## 2023-03-27 PROCEDURE — 1159F MED LIST DOCD IN RCRD: CPT | Mod: HCNC,CPTII,S$GLB, | Performed by: NURSE PRACTITIONER

## 2023-03-27 PROCEDURE — 1100F PR PT FALLS ASSESS DOC 2+ FALLS/FALL W/INJURY/YR: ICD-10-PCS | Mod: HCNC,CPTII,S$GLB, | Performed by: NURSE PRACTITIONER

## 2023-03-27 PROCEDURE — 99999 PR PBB SHADOW E&M-EST. PATIENT-LVL V: CPT | Mod: PBBFAC,HCNC,, | Performed by: NURSE PRACTITIONER

## 2023-03-27 PROCEDURE — 3288F FALL RISK ASSESSMENT DOCD: CPT | Mod: HCNC,CPTII,S$GLB, | Performed by: NURSE PRACTITIONER

## 2023-03-27 PROCEDURE — 1160F RVW MEDS BY RX/DR IN RCRD: CPT | Mod: HCNC,CPTII,S$GLB, | Performed by: NURSE PRACTITIONER

## 2023-03-27 PROCEDURE — 1160F PR REVIEW ALL MEDS BY PRESCRIBER/CLIN PHARMACIST DOCUMENTED: ICD-10-PCS | Mod: HCNC,CPTII,S$GLB, | Performed by: NURSE PRACTITIONER

## 2023-03-27 PROCEDURE — 1126F PR PAIN SEVERITY QUANTIFIED, NO PAIN PRESENT: ICD-10-PCS | Mod: HCNC,CPTII,S$GLB, | Performed by: NURSE PRACTITIONER

## 2023-03-27 PROCEDURE — G0439 PR MEDICARE ANNUAL WELLNESS SUBSEQUENT VISIT: ICD-10-PCS | Mod: HCNC,S$GLB,, | Performed by: NURSE PRACTITIONER

## 2023-03-27 PROCEDURE — 1170F FXNL STATUS ASSESSED: CPT | Mod: HCNC,CPTII,S$GLB, | Performed by: NURSE PRACTITIONER

## 2023-03-27 PROCEDURE — G0439 PPPS, SUBSEQ VISIT: HCPCS | Mod: HCNC,S$GLB,, | Performed by: NURSE PRACTITIONER

## 2023-03-27 PROCEDURE — 1126F AMNT PAIN NOTED NONE PRSNT: CPT | Mod: HCNC,CPTII,S$GLB, | Performed by: NURSE PRACTITIONER

## 2023-03-27 PROCEDURE — 1159F PR MEDICATION LIST DOCUMENTED IN MEDICAL RECORD: ICD-10-PCS | Mod: HCNC,CPTII,S$GLB, | Performed by: NURSE PRACTITIONER

## 2023-03-27 PROCEDURE — 3288F PR FALLS RISK ASSESSMENT DOCUMENTED: ICD-10-PCS | Mod: HCNC,CPTII,S$GLB, | Performed by: NURSE PRACTITIONER

## 2023-03-27 RX ORDER — VITAMIN E MIXED 400 UNIT
CAPSULE ORAL
COMMUNITY
Start: 2023-02-01

## 2023-03-27 RX ORDER — UBIDECARENONE 75 MG
CAPSULE ORAL
COMMUNITY
Start: 2023-02-01 | End: 2023-07-11

## 2023-03-27 RX ORDER — CYANOCOBALAMIN (VITAMIN B-12) 2500 MCG
TABLET, SUBLINGUAL SUBLINGUAL
COMMUNITY
Start: 2023-02-01

## 2023-03-27 NOTE — PATIENT INSTRUCTIONS
Counseling and Referral of Other Preventative  (Italic type indicates deductible and co-insurance are waived)    Patient Name: Courtney Plummer  Today's Date: 3/27/2023    Health Maintenance         Date Due Completion Date    Shingles Vaccine (2 of 3) 02/04/2010 12/10/2009    COVID-19 Vaccine (4 - Booster for Moderna series) 07/21/2022 5/26/2022    DEXA Scan 04/28/2023 4/28/2021    Override on 4/25/2014: Not Clinically Appropriate    Override on 1/4/2011: Done    TETANUS VACCINE 12/03/2025 12/3/2015 (Done)    Override on 12/3/2015: Done    Lipid Panel 10/10/2027 10/10/2022          Orders Placed This Encounter   Procedures    (In Office Administered) Zoster Recombinant Vaccine     The following information is provided to all patients.  This information is to help you find resources for any of the problems found today that may be affecting your health:                Living healthy guide: www.[facility].[place].[facility]      Understanding Diabetes: www.diabetes.org      Eating healthy: www.cdc.gov/healthyweight      [facility] home safety checklist: www.cdc.gov/steadi/patient.html      Agency on Aging: www.goea.[place].[facility]      Alcoholics anonymous (AA): www.aa.org      Physical Activity: www.kendrick.nih.gov/qq2zzxy      Tobacco use: www.quitwithusla.org

## 2023-03-28 ENCOUNTER — PATIENT MESSAGE (OUTPATIENT)
Dept: INTERNAL MEDICINE | Facility: CLINIC | Age: 88
End: 2023-03-28
Payer: MEDICARE

## 2023-03-28 DIAGNOSIS — M81.0 POST-MENOPAUSAL OSTEOPOROSIS: ICD-10-CM

## 2023-03-28 RX ORDER — ALENDRONATE SODIUM 70 MG/1
TABLET ORAL
Qty: 12 TABLET | Refills: 0 | Status: SHIPPED | OUTPATIENT
Start: 2023-03-28 | End: 2023-06-27 | Stop reason: SDUPTHER

## 2023-04-18 ENCOUNTER — LAB VISIT (OUTPATIENT)
Dept: LAB | Facility: HOSPITAL | Age: 88
End: 2023-04-18
Payer: MEDICARE

## 2023-04-18 ENCOUNTER — PATIENT MESSAGE (OUTPATIENT)
Dept: INTERNAL MEDICINE | Facility: CLINIC | Age: 88
End: 2023-04-18

## 2023-04-18 ENCOUNTER — OFFICE VISIT (OUTPATIENT)
Dept: INTERNAL MEDICINE | Facility: CLINIC | Age: 88
End: 2023-04-18
Payer: MEDICARE

## 2023-04-18 VITALS
HEIGHT: 61 IN | HEART RATE: 85 BPM | WEIGHT: 128.31 LBS | DIASTOLIC BLOOD PRESSURE: 72 MMHG | SYSTOLIC BLOOD PRESSURE: 168 MMHG | BODY MASS INDEX: 24.22 KG/M2 | OXYGEN SATURATION: 98 %

## 2023-04-18 DIAGNOSIS — G60.3 IDIOPATHIC PROGRESSIVE NEUROPATHY: ICD-10-CM

## 2023-04-18 DIAGNOSIS — E05.00 GRAVES DISEASE: ICD-10-CM

## 2023-04-18 DIAGNOSIS — D64.9 ANEMIA, UNSPECIFIED TYPE: ICD-10-CM

## 2023-04-18 DIAGNOSIS — G62.9 PERIPHERAL POLYNEUROPATHY: ICD-10-CM

## 2023-04-18 DIAGNOSIS — I10 ESSENTIAL HYPERTENSION: ICD-10-CM

## 2023-04-18 DIAGNOSIS — F07.81 POST CONCUSSIVE SYNDROME: ICD-10-CM

## 2023-04-18 DIAGNOSIS — M81.0 AGE-RELATED OSTEOPOROSIS WITHOUT CURRENT PATHOLOGICAL FRACTURE: Primary | ICD-10-CM

## 2023-04-18 PROBLEM — F32.4 MAJOR DEPRESSIVE DISORDER, SINGLE EPISODE, IN PARTIAL REMISSION: Status: ACTIVE | Noted: 2023-04-18

## 2023-04-18 LAB
ALBUMIN SERPL BCP-MCNC: 4.1 G/DL (ref 3.5–5.2)
ALP SERPL-CCNC: 65 U/L (ref 55–135)
ALT SERPL W/O P-5'-P-CCNC: 10 U/L (ref 10–44)
ANION GAP SERPL CALC-SCNC: 12 MMOL/L (ref 8–16)
AST SERPL-CCNC: 18 U/L (ref 10–40)
BASOPHILS # BLD AUTO: 0.05 K/UL (ref 0–0.2)
BASOPHILS NFR BLD: 0.5 % (ref 0–1.9)
BILIRUB SERPL-MCNC: 0.4 MG/DL (ref 0.1–1)
BUN SERPL-MCNC: 16 MG/DL (ref 8–23)
CALCIUM SERPL-MCNC: 9.3 MG/DL (ref 8.7–10.5)
CHLORIDE SERPL-SCNC: 104 MMOL/L (ref 95–110)
CO2 SERPL-SCNC: 23 MMOL/L (ref 23–29)
CREAT SERPL-MCNC: 0.7 MG/DL (ref 0.5–1.4)
DIFFERENTIAL METHOD: ABNORMAL
EOSINOPHIL # BLD AUTO: 0.2 K/UL (ref 0–0.5)
EOSINOPHIL NFR BLD: 2 % (ref 0–8)
ERYTHROCYTE [DISTWIDTH] IN BLOOD BY AUTOMATED COUNT: 17.4 % (ref 11.5–14.5)
EST. GFR  (NO RACE VARIABLE): >60 ML/MIN/1.73 M^2
GLUCOSE SERPL-MCNC: 84 MG/DL (ref 70–110)
HCT VFR BLD AUTO: 38.4 % (ref 37–48.5)
HGB BLD-MCNC: 12 G/DL (ref 12–16)
IMM GRANULOCYTES # BLD AUTO: 0.02 K/UL (ref 0–0.04)
IMM GRANULOCYTES NFR BLD AUTO: 0.2 % (ref 0–0.5)
LYMPHOCYTES # BLD AUTO: 1.6 K/UL (ref 1–4.8)
LYMPHOCYTES NFR BLD: 17 % (ref 18–48)
MCH RBC QN AUTO: 27.8 PG (ref 27–31)
MCHC RBC AUTO-ENTMCNC: 31.3 G/DL (ref 32–36)
MCV RBC AUTO: 89 FL (ref 82–98)
MONOCYTES # BLD AUTO: 1 K/UL (ref 0.3–1)
MONOCYTES NFR BLD: 10.4 % (ref 4–15)
NEUTROPHILS # BLD AUTO: 6.7 K/UL (ref 1.8–7.7)
NEUTROPHILS NFR BLD: 69.9 % (ref 38–73)
NRBC BLD-RTO: 0 /100 WBC
PLATELET # BLD AUTO: 199 K/UL (ref 150–450)
PMV BLD AUTO: 11.3 FL (ref 9.2–12.9)
POTASSIUM SERPL-SCNC: 3.9 MMOL/L (ref 3.5–5.1)
PROT SERPL-MCNC: 6.9 G/DL (ref 6–8.4)
RBC # BLD AUTO: 4.31 M/UL (ref 4–5.4)
SODIUM SERPL-SCNC: 139 MMOL/L (ref 136–145)
T3 SERPL-MCNC: 88 NG/DL (ref 60–180)
T4 FREE SERPL-MCNC: 0.91 NG/DL (ref 0.71–1.51)
TSH SERPL DL<=0.005 MIU/L-ACNC: 1.8 UIU/ML (ref 0.4–4)
VIT B12 SERPL-MCNC: 704 PG/ML (ref 210–950)
WBC # BLD AUTO: 9.57 K/UL (ref 3.9–12.7)

## 2023-04-18 PROCEDURE — 99214 PR OFFICE/OUTPT VISIT, EST, LEVL IV, 30-39 MIN: ICD-10-PCS | Mod: HCNC,S$GLB,, | Performed by: INTERNAL MEDICINE

## 2023-04-18 PROCEDURE — 3288F FALL RISK ASSESSMENT DOCD: CPT | Mod: HCNC,CPTII,S$GLB, | Performed by: INTERNAL MEDICINE

## 2023-04-18 PROCEDURE — 36415 COLL VENOUS BLD VENIPUNCTURE: CPT | Mod: HCNC | Performed by: INTERNAL MEDICINE

## 2023-04-18 PROCEDURE — 3288F PR FALLS RISK ASSESSMENT DOCUMENTED: ICD-10-PCS | Mod: HCNC,CPTII,S$GLB, | Performed by: INTERNAL MEDICINE

## 2023-04-18 PROCEDURE — 84480 ASSAY TRIIODOTHYRONINE (T3): CPT | Mod: HCNC | Performed by: INTERNAL MEDICINE

## 2023-04-18 PROCEDURE — 1126F PR PAIN SEVERITY QUANTIFIED, NO PAIN PRESENT: ICD-10-PCS | Mod: HCNC,CPTII,S$GLB, | Performed by: INTERNAL MEDICINE

## 2023-04-18 PROCEDURE — 1159F MED LIST DOCD IN RCRD: CPT | Mod: HCNC,CPTII,S$GLB, | Performed by: INTERNAL MEDICINE

## 2023-04-18 PROCEDURE — 84443 ASSAY THYROID STIM HORMONE: CPT | Mod: HCNC | Performed by: INTERNAL MEDICINE

## 2023-04-18 PROCEDURE — 1159F PR MEDICATION LIST DOCUMENTED IN MEDICAL RECORD: ICD-10-PCS | Mod: HCNC,CPTII,S$GLB, | Performed by: INTERNAL MEDICINE

## 2023-04-18 PROCEDURE — 80053 COMPREHEN METABOLIC PANEL: CPT | Mod: HCNC | Performed by: INTERNAL MEDICINE

## 2023-04-18 PROCEDURE — 85025 COMPLETE CBC W/AUTO DIFF WBC: CPT | Mod: HCNC | Performed by: INTERNAL MEDICINE

## 2023-04-18 PROCEDURE — 84439 ASSAY OF FREE THYROXINE: CPT | Mod: HCNC | Performed by: INTERNAL MEDICINE

## 2023-04-18 PROCEDURE — 99214 OFFICE O/P EST MOD 30 MIN: CPT | Mod: HCNC,S$GLB,, | Performed by: INTERNAL MEDICINE

## 2023-04-18 PROCEDURE — 99999 PR PBB SHADOW E&M-EST. PATIENT-LVL IV: ICD-10-PCS | Mod: PBBFAC,HCNC,, | Performed by: INTERNAL MEDICINE

## 2023-04-18 PROCEDURE — 1101F PT FALLS ASSESS-DOCD LE1/YR: CPT | Mod: HCNC,CPTII,S$GLB, | Performed by: INTERNAL MEDICINE

## 2023-04-18 PROCEDURE — 1101F PR PT FALLS ASSESS DOC 0-1 FALLS W/OUT INJ PAST YR: ICD-10-PCS | Mod: HCNC,CPTII,S$GLB, | Performed by: INTERNAL MEDICINE

## 2023-04-18 PROCEDURE — 82607 VITAMIN B-12: CPT | Mod: HCNC | Performed by: INTERNAL MEDICINE

## 2023-04-18 PROCEDURE — 1126F AMNT PAIN NOTED NONE PRSNT: CPT | Mod: HCNC,CPTII,S$GLB, | Performed by: INTERNAL MEDICINE

## 2023-04-18 PROCEDURE — 99999 PR PBB SHADOW E&M-EST. PATIENT-LVL IV: CPT | Mod: PBBFAC,HCNC,, | Performed by: INTERNAL MEDICINE

## 2023-04-18 RX ORDER — AMLODIPINE BESYLATE 5 MG/1
5 TABLET ORAL DAILY
Qty: 90 TABLET | Refills: 3 | Status: SHIPPED | OUTPATIENT
Start: 2023-04-18 | End: 2023-07-11

## 2023-04-18 NOTE — PROGRESS NOTES
Subjective:       Patient ID: Courtney Plummer is a 87 y.o. female.    Chief Complaint: Follow-up    HPI    Presents for follow up.     BP elevated today and has been elevated at home. Has been taking amlodipine 2.5 mg daily.     Taking advil PM nightly to help with itching.     She continues to have brain fog and some dizziness since hitting her head October. CT brain was negative. Did see neurology and dx with post concussive syndrome.     PMHx:  Osteoporosis: Severe. Last DEXA was in 2021. Continue alendronate.  Has had  compression fractures in the past.   Graves Disease: On MMI. Followed by endo. Symptoms and labs have been stable.   HTN: amlodipine, not well controlled. See above.    Anxiety: Was previously on Paxil but has since weaned off. Has tried lexapro in the past but did not find it helpful.    Pruritis: She had diffuse pruritis for the last year. She has seen Allergy, dermatology and endo for this with no clear cause. Cannot take antihistamines due to sedating effects.      She has 6 children and just had another grand child and great grandchild!     Review of Systems   Constitutional:  Negative for activity change, appetite change and chills.   HENT:  Negative for ear pain, sinus pressure/congestion and sneezing.    Respiratory:  Negative for cough and shortness of breath.    Cardiovascular:  Negative for chest pain, palpitations and leg swelling.   Gastrointestinal:  Negative for abdominal distention, abdominal pain, constipation, diarrhea, nausea and vomiting.   Genitourinary:  Negative for dysuria and hematuria.   Musculoskeletal:  Negative for arthralgias, back pain and myalgias.   Neurological:  Positive for dizziness. Negative for headaches.   Psychiatric/Behavioral:  Negative for agitation.          Past Medical History:   Diagnosis Date    Anxiety     Essential hypertension 1/8/2018    Graves disease     Hypertension     Kidney stones 12/3/2015     Past Surgical History:   Procedure  Laterality Date    BACK SURGERY      x3     SECTION, CLASSIC       last     COSMETIC SURGERY      EYE SURGERY  2019    Catatact    FRACTURE SURGERY      compression fracture    SPINE SURGERY      THYROID SURGERY        Patient Active Problem List   Diagnosis    BRIAN generalized anxiety disorder    Benign positional vertigo    Carotid bruit    Chronic low back pain    Lumbar spinal stenosis    Panic attacks    Asymptomatic gallstones    Cyst of meniscus of left knee, 2014.    History of back surgery, 2014 Dr. 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     Osteoporosis, post-menopausal    Postmenopausal status    Essential hypertension    Idiopathic peripheral neuropathy, since , back surgery    Atherosclerosis of aorta    Recurrent UTI    Compression fracture of T12 vertebra, s/p kyphoplasty     Sensation of lump in throat    Former smoker    Compression fracture of T12 vertebra with routine healing    Nocturia, x 3-4     Weight loss, non-intentional, anxiety related    Carotid arterial disease    Graves disease    Intense itching since 2021    At risk for falling    Frequent falls    Senile purpura    Compressed spine fracture        Objective:      Physical Exam  Constitutional:       Appearance: Normal appearance.   HENT:      Head: Normocephalic.      Right Ear: Tympanic membrane normal.      Left Ear: Tympanic membrane normal.      Nose: Nose normal.   Cardiovascular:      Rate and Rhythm: Normal rate and regular rhythm.      Pulses: Normal pulses.      Heart sounds: Normal heart sounds.   Pulmonary:      Effort: Pulmonary effort is normal.      Breath sounds: Normal breath sounds.   Abdominal:      General: Abdomen is flat. Bowel sounds are normal.      Palpations: Abdomen is soft.   Musculoskeletal:         General: Normal range of motion.      Cervical back: Normal range of motion and neck supple.   Skin:      General: Skin is warm and dry.   Neurological:      General: No focal deficit present.      Mental Status: She is alert and oriented to person, place, and time.   Psychiatric:         Mood and Affect: Mood normal.       Assessment:       Problem List Items Addressed This Visit          Cardiac/Vascular    Essential hypertension    Relevant Orders    COMPREHENSIVE METABOLIC PANEL       Endocrine    Graves disease    Relevant Orders    TSH    T4, FREE    T3     Other Visit Diagnoses       Age-related osteoporosis without current pathological fracture    -  Primary    Relevant Orders    DXA Bone Density Axial Skeleton 1 or more sites    Anemia, unspecified type        Relevant Orders    CBC W/ AUTO DIFFERENTIAL    Peripheral polyneuropathy        Relevant Orders    VITAMIN B12    Idiopathic progressive neuropathy        Relevant Orders    VITAMIN B12    Post concussive syndrome        Relevant Orders    Ambulatory referral/consult to Concussion Management Program            Plan:         Courtney was seen today for follow-up.    Diagnoses and all orders for this visit:    Age-related osteoporosis without current pathological fracture  -     DXA Bone Density Axial Skeleton 1 or more sites; Future    Essential hypertension  -uncontrolled. Will increase amlodipine to 5 mg daily.   Advised to STOP advil PM nightly.     Graves disease  Check labs today and continue methimazole     Anemia, unspecified type  -     CBC W/ AUTO DIFFERENTIAL; Future    Peripheral polyneuropathy  -     VITAMIN B12; Future    Post concussive syndrome  -     Ambulatory referral/consult to Concussion Management Program; Future  Continue to have symptoms will try to enroll on concussion program.             Jenny Arizmendi MD   Internal Medicine   Primary Care

## 2023-04-21 ENCOUNTER — OFFICE VISIT (OUTPATIENT)
Dept: ENDOCRINOLOGY | Facility: CLINIC | Age: 88
End: 2023-04-21
Payer: MEDICARE

## 2023-04-21 DIAGNOSIS — M81.0 OSTEOPOROSIS, POST-MENOPAUSAL: ICD-10-CM

## 2023-04-21 DIAGNOSIS — E05.00 GRAVES DISEASE: Primary | ICD-10-CM

## 2023-04-21 DIAGNOSIS — S22.080D COMPRESSION FRACTURE OF T12 VERTEBRA WITH ROUTINE HEALING: ICD-10-CM

## 2023-04-21 PROCEDURE — 99214 OFFICE O/P EST MOD 30 MIN: CPT | Mod: HCNC,95,, | Performed by: INTERNAL MEDICINE

## 2023-04-21 PROCEDURE — 99214 PR OFFICE/OUTPT VISIT, EST, LEVL IV, 30-39 MIN: ICD-10-PCS | Mod: HCNC,95,, | Performed by: INTERNAL MEDICINE

## 2023-04-21 NOTE — PROGRESS NOTES
FOLLOW-UP PATIENT VISIT    Subjective:      Chief Complaint:  Follow-up for Graves' disease and osteoporosis    HPI: Courtney Plummer is a 87 y.o. female who is here for follow-up evaluation for Graves' disease and osteoporosis      Past Medical History:   Diagnosis Date    Anxiety     Essential hypertension 1/8/2018    Graves disease     Hypertension     Kidney stones 12/3/2015     The patient's last visit with me was on 3/11/2022.    With regards to Graves':    History of lobectomy for thyroid nodules in the distant past.    Went to dermatologist in 4/2021 for itching in different areas without a rash. Labs were ordered to assess for secondary causes. She was on biotin at the time, so repeat labs were done to confirm.               Lab Results   Component Value Date    TSH 1.805 04/18/2023    TSH 0.626 10/18/2022    TSH 1.648 08/01/2022    FREET4 0.91 04/18/2023    FREET4 0.91 10/18/2022    FREET4 0.86 06/14/2022    T3FREE 2.4 04/26/2021    J1RGQUD 88 04/18/2023    I8AXMAP 87 10/18/2022    THYROTROPINR 1.79 (H) 06/14/2022       Started on MMI 4/2021 at 5 mg daily. TSH has been normal since. Reduced to 2.5 mg daily in 3/2022.    Feeling well for the most part.    Lab Results   Component Value Date    TSH 1.805 04/18/2023    TSH 0.626 10/18/2022    TSH 1.648 08/01/2022    FREET4 0.91 04/18/2023    FREET4 0.91 10/18/2022    FREET4 0.86 06/14/2022    THYROPEROXID <6.0 04/26/2021         With regards to postmenopausal osteoporosis:     Past osteoporosis medication: Took Forteo from 4/2019 to 10/2020, but T-scores continued to decline. She was then switched to Evenity, but this had to be discontinued 2/2 cost. She only received one dose of Evenity.  Current osteoporosis medication: Fosamax since 9/2021 - taking it appropriately.  No side effects so far.    She's concerned because she's had 3 friends that were on Fosamax who sustained hip fractures after a fall.    Calcium intake?  600 mg elemental Ca2+ per day - by  supplement;   Vit D3 intake?  1000 IU per day   Weight bearing exercise?   Walking  Recent falls?  Fell in October and hit her head.   Patient is using the following assist devices for ambulation: none  Sense of balance? Poor, needs to take her time.  Height loss (>2 inches)? 1.5 inches    Fracture history:  2014: L3 compression fracture - can't remember what trauma  9/2018: T12 compression fracture due to fall off step ladder  10/2021: fell backwards while working on balance exercise and sustained left posterior 10/11 rib fractures and transverse processes of L1 and L2.    Tobacco use?  Yes; quit in 1980s  EtOH use?   no     Current diarrhea or h/o malabsorption? no  Thyroid disease? TBD  Anemia? no  Kidney Stones? no  Hyperparathyroidism? no    High risk medications include:  None    Post-menopausal? Age?: 45  ERT after menopause?: no    Mom or dad with hip/spine fracture or diagnosed with osteoporosis?: no       Prior radiation treatment? no  Unexplained elevation of alkaline phosphatase? no    Dental work planned? no    Lab Results   Component Value Date    PTH 85.0 (H) 06/10/2021    PTH 58.0 09/06/2018    INLWECOJ13HD 31 06/10/2021    BAQCAZCV73MV 47 09/06/2018    UGHRXOGY34FZ 34 01/23/2018    CALCIUM 9.3 04/18/2023    CALCIUM 9.2 11/08/2022    CALCIUM 9.2 08/01/2022    ALKPHOS 65 04/18/2023    ALKPHOS 63 11/08/2022    ALKPHOS 52 (L) 08/01/2022    TSH 1.805 04/18/2023       DXA (Year)  Location 3/2019  ([place]) 4/2021  ([facility]) Percent change from previous   L-1 T-score -1.6 -    Total Hip T-score -2.9 -3.0 ([facility] since 2019)   Femoral Neck T-score -2.7 -2.5    Distal 1/3R T-score -3.1 -3.8 (Declined by 8.0% since 2019)   FRAX (MOF  /  Hip) - 24%  /  7.9%      Next DXA scheduled in June, 2023    Reviewed past medical, family, social history and updated as appropriate.    Objective:     There were no vitals filed for this visit.        BP Readings from Last 5 Encounters:   04/18/23 (!) 168/72   03/27/23 (!)  126/58   02/20/23 (!) 146/69   12/14/22 132/71   11/09/22 125/70         Physical Exam  Vitals and nursing note reviewed.   Constitutional:       Appearance: She is well-developed.   HENT:      Right Ear: External ear normal.      Left Ear: External ear normal.      Nose: Nose normal.   Eyes:      General:         Right eye: No discharge.         Left eye: No discharge.      Conjunctiva/sclera: Conjunctivae normal.   Neck:      Thyroid: No thyroid mass, thyromegaly or thyroid tenderness.      Trachea: No tracheal deviation.   Cardiovascular:      Rate and Rhythm: Normal rate and regular rhythm.      Heart sounds: No murmur heard.  Pulmonary:      Effort: Pulmonary effort is normal.      Breath sounds: Normal breath sounds.   Musculoskeletal:      Comments: No digital clubbing or extremity cyanosis   Skin:     Findings: No rash.      Comments: No subcutaneous nodules noted.   Neurological:      Mental Status: She is alert and oriented to person, place, and time.      Coordination: Coordination normal.   Psychiatric:         Mood and Affect: Mood normal.         Behavior: Behavior normal.         Thought Content: Thought content normal.      Comments: Alert and oriented to person, place, and situation.         Wt Readings from Last 30 Encounters:   04/18/23 1012 58.2 kg (128 lb 4.9 oz)   03/27/23 1022 58 kg (127 lb 13.9 oz)   02/20/23 1324 55.8 kg (123 lb)   12/14/22 0939 57.1 kg (125 lb 12.4 oz)   11/09/22 0843 59 kg (130 lb)   11/08/22 0830 59.4 kg (130 lb 15.3 oz)   10/18/22 1035 60.7 kg (133 lb 13.1 oz)   08/29/22 1503 60.8 kg (134 lb 0.6 oz)   03/22/22 0957 59.5 kg (131 lb 2.8 oz)   03/11/22 1125 59 kg (130 lb 1.1 oz)   02/22/22 0831 58.5 kg (128 lb 15.5 oz)   12/31/21 1447 56.7 kg (125 lb)   11/06/21 1149 58.3 kg (128 lb 8.5 oz)   10/07/21 1252 55.8 kg (123 lb)   06/08/21 1441 57.1 kg (125 lb 12.4 oz)   05/21/21 1021 55.7 kg (122 lb 14.5 oz)   05/19/21 1500 55.7 kg (122 lb 12.7 oz)   05/03/21 1322 55.8 kg (123  lb 0.3 oz)   04/22/21 1026 55.7 kg (122 lb 12.7 oz)   04/21/21 0943 54.9 kg (121 lb)   07/14/20 1314 54.6 kg (120 lb 5.9 oz)   07/14/20 1314 54.6 kg (120 lb 5.9 oz)   04/15/20 1409 57.8 kg (127 lb 6.8 oz)   03/09/20 1549 58.2 kg (128 lb 4.9 oz)   02/21/20 0930 58.1 kg (128 lb)   02/03/20 1140 58.2 kg (128 lb 4.9 oz)   01/14/20 1545 59.5 kg (131 lb 2.8 oz)   11/12/19 1608 60.1 kg (132 lb 6.2 oz)   03/28/19 1021 57.8 kg (127 lb 6.8 oz)   03/09/19 1204 57.2 kg (126 lb)       No results found for: HGBA1C  Lab Results   Component Value Date    CHOL 202 (H) 10/10/2022    HDL 67 10/10/2022    LDLCALC 121.6 10/10/2022    TRIG 67 10/10/2022    CHOLHDL 33.2 10/10/2022     Lab Results   Component Value Date     04/18/2023    K 3.9 04/18/2023     04/18/2023    CO2 23 04/18/2023    GLU 84 04/18/2023    BUN 16 04/18/2023    CREATININE 0.7 04/18/2023    CALCIUM 9.3 04/18/2023    PROT 6.9 04/18/2023    ALBUMIN 4.1 04/18/2023    BILITOT 0.4 04/18/2023    ALKPHOS 65 04/18/2023    AST 18 04/18/2023    ALT 10 04/18/2023    ANIONGAP 12 04/18/2023    ESTGFRAFRICA >60.0 12/07/2021    EGFRNONAA 58.5 (A) 12/07/2021    TSH 1.805 04/18/2023      No results found for: MICALBCREAT    Assessment/Plan:     Graves disease  TSH at goal.  She's been on therapy for 2 years and is currently only requiring 2.5 mg daily methimazole. Will stop and repeat labs in 3 months to see if she's in a remission.    Osteoporosis, post-menopausal  Risks include low weight,  race, menopause, previous compression fractures    Reviewed basics of quantity versus quality  She's completed a 14 month course of Forteo and was only able to get Evenity for 1 injection due to cost. Now on Fosamax since 9/2021 and tolerating it well.     Continue calcium and Vitamin D3    Fall precautions emphasized  +weight bearing exercise    Will follow-up DXA in June. If BMD is stable or improving, will continue Fosamax. If we see declines, will most likely change to  parenteral treatments.    Compression fracture of T12 vertebra with routine healing  See above. At high risk for additional fractures.    The patient location is: home  The chief complaint leading to consultation is: osteoporosis and Graves'    Visit type: audiovisual    Face to Face time with patient: 25 minutes of total time spent on the encounter, which includes face to face time and non-face to face time preparing to see the patient (eg, review of tests), Obtaining and/or reviewing separately obtained history, Documenting clinical information in the electronic or other health record, Independently interpreting results (not separately reported) and communicating results to the patient/family/caregiver, or Care coordination (not separately reported).         Each patient to whom he or she provides medical services by telemedicine is:  (1) informed of the relationship between the physician and patient and the respective role of any other health care provider with respect to management of the patient; and (2) notified that he or she may decline to receive medical services by telemedicine and may withdraw from such care at any time.    Notes:       RTC in 12 months;  Lab in 3 months.

## 2023-04-21 NOTE — ASSESSMENT & PLAN NOTE
Risks include low weight,  race, menopause, previous compression fractures    Reviewed basics of quantity versus quality  She's completed a 14 month course of Forteo and was only able to get Evenity for 1 injection due to cost. Now on Fosamax since 9/2021 and tolerating it well.     Continue calcium and Vitamin D3    Fall precautions emphasized  +weight bearing exercise    Will follow-up DXA in June. If BMD is stable or improving, will continue Fosamax. If we see declines, will most likely change to parenteral treatments.

## 2023-06-05 ENCOUNTER — APPOINTMENT (OUTPATIENT)
Dept: RADIOLOGY | Facility: CLINIC | Age: 88
End: 2023-06-05
Attending: INTERNAL MEDICINE
Payer: MEDICARE

## 2023-06-05 DIAGNOSIS — M81.0 AGE-RELATED OSTEOPOROSIS WITHOUT CURRENT PATHOLOGICAL FRACTURE: ICD-10-CM

## 2023-06-05 DIAGNOSIS — M81.0 OSTEOPOROSIS, POST-MENOPAUSAL: ICD-10-CM

## 2023-06-05 PROCEDURE — 77080 DXA BONE DENSITY AXIAL: CPT | Mod: TC,PO

## 2023-06-05 PROCEDURE — 77080 DXA BONE DENSITY AXIAL: CPT | Mod: 26,,, | Performed by: INTERNAL MEDICINE

## 2023-06-05 PROCEDURE — 77080 DXA BONE DENSITY AXIAL SKELETON 1 OR MORE SITES: ICD-10-PCS | Mod: 26,,, | Performed by: INTERNAL MEDICINE

## 2023-06-07 ENCOUNTER — TELEPHONE (OUTPATIENT)
Dept: INTERNAL MEDICINE | Facility: CLINIC | Age: 88
End: 2023-06-07
Payer: MEDICARE

## 2023-06-07 NOTE — TELEPHONE ENCOUNTER
Spoke to patient and advised of DEXA results and recommendation. Patient verbalized understanding.

## 2023-06-07 NOTE — TELEPHONE ENCOUNTER
----- Message from Jenny Arizmendi MD sent at 6/6/2023  3:11 PM CDT -----  Please let her know the bone density scan shows continued osteoporosis and she should continue takingt he fosamax thanks!

## 2023-06-26 ENCOUNTER — PATIENT MESSAGE (OUTPATIENT)
Dept: ADMINISTRATIVE | Facility: OTHER | Age: 88
End: 2023-06-26
Payer: MEDICARE

## 2023-06-27 DIAGNOSIS — M81.0 POST-MENOPAUSAL OSTEOPOROSIS: ICD-10-CM

## 2023-06-27 NOTE — TELEPHONE ENCOUNTER
Refill Routing Note   Medication(s) are not appropriate for processing by [facility] for the following reason(s):      Medication outside of protocol    [facility] action(s):  Route Care Due:  None identified          Appointments  past 12m or future 3m with PCP    Date Provider   Last Visit   4/18/2023 Jenny Arizmendi MD   Next Visit   7/11/2023 Jenny Arizmendi MD   ED visits in past 90 days: 0        Note composed:11:23 AM 06/27/2023

## 2023-06-28 RX ORDER — ALENDRONATE SODIUM 70 MG/1
TABLET ORAL
Qty: 12 TABLET | Refills: 0 | Status: SHIPPED | OUTPATIENT
Start: 2023-06-28 | End: 2023-09-07 | Stop reason: SDUPTHER

## 2023-07-11 ENCOUNTER — OFFICE VISIT (OUTPATIENT)
Dept: INTERNAL MEDICINE | Facility: CLINIC | Age: 88
End: 2023-07-11
Payer: MEDICARE

## 2023-07-11 VITALS
BODY MASS INDEX: 23.53 KG/M2 | SYSTOLIC BLOOD PRESSURE: 149 MMHG | DIASTOLIC BLOOD PRESSURE: 60 MMHG | HEIGHT: 62 IN | HEART RATE: 96 BPM | WEIGHT: 127.88 LBS | OXYGEN SATURATION: 98 %

## 2023-07-11 DIAGNOSIS — E05.00 GRAVES DISEASE: ICD-10-CM

## 2023-07-11 DIAGNOSIS — I10 ESSENTIAL HYPERTENSION: Primary | ICD-10-CM

## 2023-07-11 DIAGNOSIS — F32.4 MAJOR DEPRESSIVE DISORDER, SINGLE EPISODE, IN PARTIAL REMISSION: ICD-10-CM

## 2023-07-11 DIAGNOSIS — F07.81 POST CONCUSSIVE SYNDROME: ICD-10-CM

## 2023-07-11 PROCEDURE — 99999 PR PBB SHADOW E&M-EST. PATIENT-LVL III: CPT | Mod: PBBFAC,HCNC,, | Performed by: INTERNAL MEDICINE

## 2023-07-11 PROCEDURE — 99999 PR PBB SHADOW E&M-EST. PATIENT-LVL III: ICD-10-PCS | Mod: PBBFAC,HCNC,, | Performed by: INTERNAL MEDICINE

## 2023-07-11 PROCEDURE — 99214 OFFICE O/P EST MOD 30 MIN: CPT | Mod: HCNC,S$GLB,, | Performed by: INTERNAL MEDICINE

## 2023-07-11 PROCEDURE — 1101F PR PT FALLS ASSESS DOC 0-1 FALLS W/OUT INJ PAST YR: ICD-10-PCS | Mod: HCNC,CPTII,S$GLB, | Performed by: INTERNAL MEDICINE

## 2023-07-11 PROCEDURE — 1126F AMNT PAIN NOTED NONE PRSNT: CPT | Mod: HCNC,CPTII,S$GLB, | Performed by: INTERNAL MEDICINE

## 2023-07-11 PROCEDURE — 99214 PR OFFICE/OUTPT VISIT, EST, LEVL IV, 30-39 MIN: ICD-10-PCS | Mod: HCNC,S$GLB,, | Performed by: INTERNAL MEDICINE

## 2023-07-11 PROCEDURE — 3288F PR FALLS RISK ASSESSMENT DOCUMENTED: ICD-10-PCS | Mod: HCNC,CPTII,S$GLB, | Performed by: INTERNAL MEDICINE

## 2023-07-11 PROCEDURE — 1126F PR PAIN SEVERITY QUANTIFIED, NO PAIN PRESENT: ICD-10-PCS | Mod: HCNC,CPTII,S$GLB, | Performed by: INTERNAL MEDICINE

## 2023-07-11 PROCEDURE — 1101F PT FALLS ASSESS-DOCD LE1/YR: CPT | Mod: HCNC,CPTII,S$GLB, | Performed by: INTERNAL MEDICINE

## 2023-07-11 PROCEDURE — 3288F FALL RISK ASSESSMENT DOCD: CPT | Mod: HCNC,CPTII,S$GLB, | Performed by: INTERNAL MEDICINE

## 2023-07-11 RX ORDER — ESCITALOPRAM OXALATE 5 MG/1
5 TABLET ORAL DAILY
Qty: 30 TABLET | Refills: 11 | Status: SHIPPED | OUTPATIENT
Start: 2023-07-11 | End: 2023-08-30

## 2023-07-11 RX ORDER — LISINOPRIL 10 MG/1
10 TABLET ORAL DAILY
Qty: 90 TABLET | Refills: 3 | Status: SHIPPED | OUTPATIENT
Start: 2023-07-11 | End: 2024-07-10

## 2023-07-11 NOTE — PROGRESS NOTES
Subjective:       Patient ID: Courtney Plummer is a 88 y.o. female.    Chief Complaint: Follow-up    HPI    Presents for follow up.      BP elevated today and has been elevated at home. Has been taking amlodipine 2.5 mg daily.      Taking advil PM nightly to help with itching.      Has been feeling more irritable lately. Weaned of lexapro in      PMHx:  Osteoporosis: Severe. Last DEXA was in . Continue alendronate.  Has had  compression fractures in the past.   Graves Disease: On MMI. Followed by endo. Symptoms and labs have been stable.   HTN: amlodipine, not well controlled. See above.    Anxiety: Was previously on Paxil but has since weaned off.        Pruritis: She had diffuse pruritis for the last year. She has seen Allergy, dermatology and endo for this with no clear cause. Cannot take antihistamines due to sedating effects.     Post concussive syndrome s/p fall in 2022    She has 6 children and just had another grand child and great grandchild!       Review of Systems   Constitutional:  Negative for activity change, appetite change and chills.   HENT:  Negative for ear pain, sinus pressure/congestion and sneezing.    Respiratory:  Negative for cough and shortness of breath.    Cardiovascular:  Negative for chest pain, palpitations and leg swelling.   Gastrointestinal:  Negative for abdominal distention, abdominal pain, constipation, diarrhea, nausea and vomiting.   Genitourinary:  Negative for dysuria and hematuria.   Musculoskeletal:  Negative for arthralgias, back pain and myalgias.   Neurological:  Positive for dizziness. Negative for headaches.   Psychiatric/Behavioral:  Negative for agitation.            Past Medical History:   Diagnosis Date    Anxiety     Essential hypertension 2018    Graves disease     Hypertension     Kidney stones 12/3/2015     Past Surgical History:   Procedure Laterality Date    BACK SURGERY      x3     SECTION, CLASSIC       last      COSMETIC SURGERY  2001    EYE SURGERY  2019    Catatact    FRACTURE SURGERY      compression fracture    SPINE SURGERY      THYROID SURGERY        Patient Active Problem List   Diagnosis    BRIAN generalized anxiety disorder    Benign positional vertigo    Carotid bruit    Chronic low back pain    Lumbar spinal stenosis    Panic attacks    Asymptomatic gallstones    Cyst of meniscus of left knee, 04-.    History of back surgery, 09- Dr. 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 2021    At risk for falling    Frequent falls    Senile purpura    Compressed spine fracture    Major depressive disorder, single episode, in partial remission        Objective:      Physical Exam  Constitutional:       Appearance: Normal appearance.   HENT:      Head: Normocephalic.      Right Ear: Tympanic membrane normal.      Left Ear: Tympanic membrane normal.      Nose: Nose normal.   Cardiovascular:      Rate and Rhythm: Normal rate and regular rhythm.      Pulses: Normal pulses.      Heart sounds: Normal heart sounds.   Pulmonary:      Effort: Pulmonary effort is normal.      Breath sounds: Normal breath sounds.   Abdominal:      General: Abdomen is flat. Bowel sounds are normal.      Palpations: Abdomen is soft.   Musculoskeletal:         General: Normal range of motion.      Cervical back: Normal range of motion and neck supple.   Skin:     General: Skin is warm and  dry.   Neurological:      General: No focal deficit present.      Mental Status: She is alert and oriented to person, place, and time.   Psychiatric:         Mood and Affect: Mood normal.         Assessment:       Problem List Items Addressed This Visit          Psychiatric    Major depressive disorder, single episode, in partial remission       Cardiac/Vascular    Essential hypertension - Primary    Relevant Orders    BASIC METABOLIC PANEL (Completed)       Endocrine    Graves disease     Other Visit Diagnoses       Post concussive syndrome                Plan:         Courtney was seen today for follow-up.    Diagnoses and all orders for this visit:    Essential hypertension  -     BASIC METABOLIC PANEL; Future  -     lisinopriL 10 MG tablet; Take 1 tablet (10 mg total) by mouth once daily.  Start lisinopril as BP still uncontrolled     Major depressive disorder, single episode, in partial remission  -     EScitalopram oxalate (LEXAPRO) 5 MG Tab; Take 1 tablet (5 mg total) by mouth once daily.  Restart lexapro at 5 mg daily     Graves disease  Follow up endocrinology. Continue methimazole     Post concussive syndrome  Improved. Seen in neurology clinic. CT brain was normal.        Follow up in 4 weeks.           Jenny Arizmendi MD   Internal Medicine   Primary Care

## 2023-07-19 ENCOUNTER — PATIENT MESSAGE (OUTPATIENT)
Dept: INTERNAL MEDICINE | Facility: CLINIC | Age: 88
End: 2023-07-19
Payer: MEDICARE

## 2023-07-20 NOTE — PROGRESS NOTES
TWO PT identifier confirmed 9 name and . Pt tolerated injection well. Patient was instructed to wait 15mins post injection.       Yes

## 2023-07-24 ENCOUNTER — PATIENT MESSAGE (OUTPATIENT)
Dept: ENDOCRINOLOGY | Facility: HOSPITAL | Age: 88
End: 2023-07-24
Payer: MEDICARE

## 2023-07-24 ENCOUNTER — LAB VISIT (OUTPATIENT)
Dept: LAB | Facility: HOSPITAL | Age: 88
End: 2023-07-24
Attending: INTERNAL MEDICINE
Payer: MEDICARE

## 2023-07-24 DIAGNOSIS — I10 ESSENTIAL HYPERTENSION: ICD-10-CM

## 2023-07-24 DIAGNOSIS — E05.00 GRAVES DISEASE: ICD-10-CM

## 2023-07-24 DIAGNOSIS — E05.00 GRAVES DISEASE: Primary | ICD-10-CM

## 2023-07-24 LAB
ANION GAP SERPL CALC-SCNC: 10 MMOL/L (ref 8–16)
BUN SERPL-MCNC: 14 MG/DL (ref 8–23)
CALCIUM SERPL-MCNC: 8.8 MG/DL (ref 8.7–10.5)
CHLORIDE SERPL-SCNC: 106 MMOL/L (ref 95–110)
CO2 SERPL-SCNC: 25 MMOL/L (ref 23–29)
CREAT SERPL-MCNC: 0.8 MG/DL (ref 0.5–1.4)
EST. GFR  (NO RACE VARIABLE): >60 ML/MIN/1.73 M^2
GLUCOSE SERPL-MCNC: 79 MG/DL (ref 70–110)
POTASSIUM SERPL-SCNC: 4.8 MMOL/L (ref 3.5–5.1)
SODIUM SERPL-SCNC: 141 MMOL/L (ref 136–145)
T4 FREE SERPL-MCNC: 1.01 NG/DL (ref 0.71–1.51)
TSH SERPL DL<=0.005 MIU/L-ACNC: 0.78 UIU/ML (ref 0.4–4)

## 2023-07-24 PROCEDURE — 80048 BASIC METABOLIC PNL TOTAL CA: CPT | Mod: HCNC | Performed by: INTERNAL MEDICINE

## 2023-07-24 PROCEDURE — 36415 COLL VENOUS BLD VENIPUNCTURE: CPT | Mod: HCNC | Performed by: INTERNAL MEDICINE

## 2023-07-24 PROCEDURE — 84443 ASSAY THYROID STIM HORMONE: CPT | Mod: HCNC | Performed by: INTERNAL MEDICINE

## 2023-07-24 PROCEDURE — 84439 ASSAY OF FREE THYROXINE: CPT | Mod: HCNC | Performed by: INTERNAL MEDICINE

## 2023-08-14 ENCOUNTER — PATIENT MESSAGE (OUTPATIENT)
Dept: INTERNAL MEDICINE | Facility: CLINIC | Age: 88
End: 2023-08-14
Payer: MEDICARE

## 2023-08-30 ENCOUNTER — OFFICE VISIT (OUTPATIENT)
Dept: INTERNAL MEDICINE | Facility: CLINIC | Age: 88
End: 2023-08-30
Payer: MEDICARE

## 2023-08-30 VITALS
HEART RATE: 76 BPM | WEIGHT: 126.31 LBS | BODY MASS INDEX: 23.24 KG/M2 | HEIGHT: 62 IN | OXYGEN SATURATION: 97 % | SYSTOLIC BLOOD PRESSURE: 138 MMHG | DIASTOLIC BLOOD PRESSURE: 60 MMHG

## 2023-08-30 DIAGNOSIS — R42 DIZZINESS: ICD-10-CM

## 2023-08-30 DIAGNOSIS — I10 ESSENTIAL HYPERTENSION: ICD-10-CM

## 2023-08-30 DIAGNOSIS — G60.3 IDIOPATHIC PROGRESSIVE NEUROPATHY: ICD-10-CM

## 2023-08-30 DIAGNOSIS — R79.9 ABNORMAL FINDING OF BLOOD CHEMISTRY, UNSPECIFIED: ICD-10-CM

## 2023-08-30 DIAGNOSIS — R74.8 ABNORMAL LEVELS OF OTHER SERUM ENZYMES: ICD-10-CM

## 2023-08-30 DIAGNOSIS — E05.00 GRAVES DISEASE: Primary | ICD-10-CM

## 2023-08-30 PROCEDURE — 1126F AMNT PAIN NOTED NONE PRSNT: CPT | Mod: HCNC,CPTII,S$GLB, | Performed by: INTERNAL MEDICINE

## 2023-08-30 PROCEDURE — 3288F PR FALLS RISK ASSESSMENT DOCUMENTED: ICD-10-PCS | Mod: HCNC,CPTII,S$GLB, | Performed by: INTERNAL MEDICINE

## 2023-08-30 PROCEDURE — 1126F PR PAIN SEVERITY QUANTIFIED, NO PAIN PRESENT: ICD-10-PCS | Mod: HCNC,CPTII,S$GLB, | Performed by: INTERNAL MEDICINE

## 2023-08-30 PROCEDURE — 99214 PR OFFICE/OUTPT VISIT, EST, LEVL IV, 30-39 MIN: ICD-10-PCS | Mod: HCNC,S$GLB,, | Performed by: INTERNAL MEDICINE

## 2023-08-30 PROCEDURE — 99214 OFFICE O/P EST MOD 30 MIN: CPT | Mod: HCNC,S$GLB,, | Performed by: INTERNAL MEDICINE

## 2023-08-30 PROCEDURE — 3288F FALL RISK ASSESSMENT DOCD: CPT | Mod: HCNC,CPTII,S$GLB, | Performed by: INTERNAL MEDICINE

## 2023-08-30 PROCEDURE — 99999 PR PBB SHADOW E&M-EST. PATIENT-LVL III: CPT | Mod: PBBFAC,HCNC,, | Performed by: INTERNAL MEDICINE

## 2023-08-30 PROCEDURE — 99999 PR PBB SHADOW E&M-EST. PATIENT-LVL III: ICD-10-PCS | Mod: PBBFAC,HCNC,, | Performed by: INTERNAL MEDICINE

## 2023-08-30 PROCEDURE — 1101F PT FALLS ASSESS-DOCD LE1/YR: CPT | Mod: HCNC,CPTII,S$GLB, | Performed by: INTERNAL MEDICINE

## 2023-08-30 PROCEDURE — 1101F PR PT FALLS ASSESS DOC 0-1 FALLS W/OUT INJ PAST YR: ICD-10-PCS | Mod: HCNC,CPTII,S$GLB, | Performed by: INTERNAL MEDICINE

## 2023-08-30 NOTE — PROGRESS NOTES
"Subjective:       Patient ID: Courtney Plummer is a 88 y.o. female.    Chief Complaint: Establish Care    HPI      Presents for follow up.      During previous visit she was having elevated BPs. We stopped amlodipine 2.5 mg daily and started lisinopril 10 mg daily. BP is not better controlled.     We also start lexapro due to increase irritability but she did not take it as she was worried about side effects.     Feels "fuzzy" in her head. At times feels off balance.        PMHx:  Osteoporosis: Severe. Last DEXA was in 2021. Continue alendronate.  Has had  compression fractures in the past.   Graves Disease: On MMI. Followed by endo. Symptoms and labs have been stable.   HTN: amlodipine, not well controlled. See above.    Anxiety: Was previously on Paxil but has since weaned off.          Pruritis: She had diffuse pruritis for the last year. She has seen Allergy, dermatology and endo for this with no clear cause. Cannot take antihistamines due to sedating effects.     Post concussive syndrome s/p fall in October 2022     She has 6 children and just had another grand child and great grandchild!            Review of Systems   Constitutional:  Negative for activity change, appetite change and chills.   HENT:  Negative for ear pain, sinus pressure/congestion and sneezing.    Respiratory:  Negative for cough and shortness of breath.    Cardiovascular:  Negative for chest pain, palpitations and leg swelling.   Gastrointestinal:  Negative for abdominal distention, abdominal pain, constipation, diarrhea, nausea and vomiting.   Genitourinary:  Negative for dysuria and hematuria.   Musculoskeletal:  Negative for arthralgias, back pain and myalgias.   Neurological:  Negative for dizziness and headaches.   Psychiatric/Behavioral:  Negative for agitation.            Past Medical History:   Diagnosis Date    Anxiety     Essential hypertension 1/8/2018    Graves disease     Hypertension     Kidney stones 12/3/2015     Past " Surgical History:   Procedure Laterality Date    BACK SURGERY      x3     SECTION, CLASSIC       last     COSMETIC SURGERY      EYE SURGERY  2019    Catatact    FRACTURE SURGERY      compression fracture    SPINE SURGERY      THYROID SURGERY        Patient Active Problem List   Diagnosis    BRIAN generalized anxiety disorder    Benign positional vertigo    Carotid bruit    Chronic low back pain    Lumbar spinal stenosis    Panic attacks    Asymptomatic gallstones    Cyst of meniscus of left knee, 2014.    History of back surgery, 2014 Dr. 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     Osteoporosis, post-menopausal    Postmenopausal status    Essential hypertension    Idiopathic peripheral neuropathy, since , back surgery    Atherosclerosis of aorta    Recurrent UTI    Compression fracture of T12 vertebra, s/p kyphoplasty     Sensation of lump in throat    Former smoker    Compression fracture of T12 vertebra with routine healing    Nocturia, x 3-4     Weight loss, non-intentional, anxiety related    Carotid arterial disease    Graves disease    Intense itching since 2021    At risk for falling    Frequent falls    Senile purpura    Compressed spine fracture    Major depressive disorder, single episode, in partial remission        Objective:      Physical Exam  Constitutional:       Appearance: Normal appearance.   HENT:      Head: Normocephalic.      Right Ear: Tympanic membrane normal.      Left Ear: Tympanic membrane normal.      Nose: Nose normal.   Cardiovascular:      Rate and Rhythm: Normal rate and regular rhythm.      Pulses: Normal pulses.      Heart sounds: Normal heart sounds.   Pulmonary:      Effort: Pulmonary effort is normal.      Breath sounds: Normal breath sounds.   Abdominal:      General: Abdomen is flat. Bowel sounds are normal.      Palpations: Abdomen is soft.   Musculoskeletal:          General: Normal range of motion.      Cervical back: Normal range of motion and neck supple.   Skin:     General: Skin is warm and dry.   Neurological:      General: No focal deficit present.      Mental Status: She is alert and oriented to person, place, and time.   Psychiatric:         Mood and Affect: Mood normal.         Assessment:       Problem List Items Addressed This Visit          Cardiac/Vascular    Essential hypertension    Relevant Orders    COMPREHENSIVE METABOLIC PANEL    CBC W/ AUTO DIFFERENTIAL    IRON AND TIBC       Endocrine    Graves disease - Primary    Relevant Orders    TSH     Other Visit Diagnoses       Dizziness        Relevant Orders    Ambulatory referral/consult to Neurology    Idiopathic progressive neuropathy        Relevant Orders    VITAMIN B12    FOLATE    VITAMIN B1    VITAMIN B6    Abnormal levels of other serum enzymes        Relevant Orders    VITAMIN B6    Abnormal finding of blood chemistry, unspecified        Relevant Orders    IRON AND TIBC            Plan:         Courtney was seen today for establish care.    Diagnoses and all orders for this visit:    Graves disease  Stable on current meds follows with endocrine.     Essential hypertension  Now well controlled lisinopril.   Follow up CMP was WNL and will check again     Dizziness  -     Ambulatory referral/consult to Neurology; Future  Check labs and refer to neurology     Idiopathic progressive neuropathy  -     VITAMIN B12; Future  -     FOLATE; Future  -     VITAMIN B1; Future  -     VITAMIN B6; Future              Jenny Arizmendi MD   Internal Medicine   Primary Care

## 2023-09-07 DIAGNOSIS — M81.0 POST-MENOPAUSAL OSTEOPOROSIS: ICD-10-CM

## 2023-09-07 RX ORDER — ALENDRONATE SODIUM 70 MG/1
TABLET ORAL
Qty: 12 TABLET | Refills: 0 | Status: SHIPPED | OUTPATIENT
Start: 2023-09-07 | End: 2023-12-01

## 2023-09-07 NOTE — TELEPHONE ENCOUNTER
Refill Routing Note   Medication(s) are not appropriate for processing by [facility] for the following reason(s):      Medication outside of protocol    [facility] action(s):  Route Care Due:  None identified            Appointments  past 12m or future 3m with PCP    Date Provider   Last Visit   8/30/2023 Jenny Arizmendi MD   Next Visit   12/1/2023 Jenny Arizmendi MD   ED visits in past 90 days: 0        Note composed:11:57 AM 09/07/2023

## 2023-11-13 ENCOUNTER — LAB VISIT (OUTPATIENT)
Dept: LAB | Facility: HOSPITAL | Age: 88
End: 2023-11-13
Payer: MEDICARE

## 2023-11-13 DIAGNOSIS — R74.8 ABNORMAL LEVELS OF OTHER SERUM ENZYMES: ICD-10-CM

## 2023-11-13 DIAGNOSIS — I10 ESSENTIAL HYPERTENSION: ICD-10-CM

## 2023-11-13 DIAGNOSIS — E05.00 GRAVES DISEASE: ICD-10-CM

## 2023-11-13 DIAGNOSIS — R79.9 ABNORMAL FINDING OF BLOOD CHEMISTRY, UNSPECIFIED: ICD-10-CM

## 2023-11-13 DIAGNOSIS — G60.3 IDIOPATHIC PROGRESSIVE NEUROPATHY: ICD-10-CM

## 2023-11-13 LAB
ALBUMIN SERPL BCP-MCNC: 3.9 G/DL (ref 3.5–5.2)
ALP SERPL-CCNC: 47 U/L (ref 55–135)
ALT SERPL W/O P-5'-P-CCNC: 10 U/L (ref 10–44)
ANION GAP SERPL CALC-SCNC: 10 MMOL/L (ref 8–16)
AST SERPL-CCNC: 18 U/L (ref 10–40)
BASOPHILS # BLD AUTO: 0.03 K/UL (ref 0–0.2)
BASOPHILS NFR BLD: 0.6 % (ref 0–1.9)
BILIRUB SERPL-MCNC: 0.7 MG/DL (ref 0.1–1)
BUN SERPL-MCNC: 14 MG/DL (ref 8–23)
CALCIUM SERPL-MCNC: 9.1 MG/DL (ref 8.7–10.5)
CHLORIDE SERPL-SCNC: 108 MMOL/L (ref 95–110)
CO2 SERPL-SCNC: 25 MMOL/L (ref 23–29)
CREAT SERPL-MCNC: 0.8 MG/DL (ref 0.5–1.4)
DIFFERENTIAL METHOD: ABNORMAL
EOSINOPHIL # BLD AUTO: 0.2 K/UL (ref 0–0.5)
EOSINOPHIL NFR BLD: 3.3 % (ref 0–8)
ERYTHROCYTE [DISTWIDTH] IN BLOOD BY AUTOMATED COUNT: 18.5 % (ref 11.5–14.5)
EST. GFR  (NO RACE VARIABLE): >60 ML/MIN/1.73 M^2
FOLATE SERPL-MCNC: 13.8 NG/ML (ref 4–24)
GLUCOSE SERPL-MCNC: 72 MG/DL (ref 70–110)
HCT VFR BLD AUTO: 37.9 % (ref 37–48.5)
HGB BLD-MCNC: 12 G/DL (ref 12–16)
IMM GRANULOCYTES # BLD AUTO: 0.01 K/UL (ref 0–0.04)
IMM GRANULOCYTES NFR BLD AUTO: 0.2 % (ref 0–0.5)
IRON SERPL-MCNC: 154 UG/DL (ref 30–160)
LYMPHOCYTES # BLD AUTO: 1.5 K/UL (ref 1–4.8)
LYMPHOCYTES NFR BLD: 30.5 % (ref 18–48)
MCH RBC QN AUTO: 28.9 PG (ref 27–31)
MCHC RBC AUTO-ENTMCNC: 31.7 G/DL (ref 32–36)
MCV RBC AUTO: 91 FL (ref 82–98)
MONOCYTES # BLD AUTO: 0.7 K/UL (ref 0.3–1)
MONOCYTES NFR BLD: 14.3 % (ref 4–15)
NEUTROPHILS # BLD AUTO: 2.5 K/UL (ref 1.8–7.7)
NEUTROPHILS NFR BLD: 51.1 % (ref 38–73)
NRBC BLD-RTO: 0 /100 WBC
PLATELET # BLD AUTO: 213 K/UL (ref 150–450)
PMV BLD AUTO: 12.3 FL (ref 9.2–12.9)
POTASSIUM SERPL-SCNC: 4.5 MMOL/L (ref 3.5–5.1)
PROT SERPL-MCNC: 6.5 G/DL (ref 6–8.4)
RBC # BLD AUTO: 4.15 M/UL (ref 4–5.4)
SATURATED IRON: 45 % (ref 20–50)
SODIUM SERPL-SCNC: 143 MMOL/L (ref 136–145)
TOTAL IRON BINDING CAPACITY: 346 UG/DL (ref 250–450)
TRANSFERRIN SERPL-MCNC: 234 MG/DL (ref 200–375)
TSH SERPL DL<=0.005 MIU/L-ACNC: 0.61 UIU/ML (ref 0.4–4)
VIT B12 SERPL-MCNC: 1215 PG/ML (ref 210–950)
WBC # BLD AUTO: 4.89 K/UL (ref 3.9–12.7)

## 2023-11-13 PROCEDURE — 84466 ASSAY OF TRANSFERRIN: CPT | Mod: HCNC | Performed by: INTERNAL MEDICINE

## 2023-11-13 PROCEDURE — 83540 ASSAY OF IRON: CPT | Mod: HCNC | Performed by: INTERNAL MEDICINE

## 2023-11-13 PROCEDURE — 80053 COMPREHEN METABOLIC PANEL: CPT | Mod: HCNC | Performed by: INTERNAL MEDICINE

## 2023-11-13 PROCEDURE — 82746 ASSAY OF FOLIC ACID SERUM: CPT | Mod: HCNC | Performed by: INTERNAL MEDICINE

## 2023-11-13 PROCEDURE — 85025 COMPLETE CBC W/AUTO DIFF WBC: CPT | Mod: HCNC | Performed by: INTERNAL MEDICINE

## 2023-11-13 PROCEDURE — 84443 ASSAY THYROID STIM HORMONE: CPT | Mod: HCNC | Performed by: INTERNAL MEDICINE

## 2023-11-13 PROCEDURE — 84425 ASSAY OF VITAMIN B-1: CPT | Mod: HCNC | Performed by: INTERNAL MEDICINE

## 2023-11-13 PROCEDURE — 82607 VITAMIN B-12: CPT | Mod: HCNC | Performed by: INTERNAL MEDICINE

## 2023-11-13 PROCEDURE — 84207 ASSAY OF VITAMIN B-6: CPT | Mod: HCNC | Performed by: INTERNAL MEDICINE

## 2023-11-13 PROCEDURE — 36415 COLL VENOUS BLD VENIPUNCTURE: CPT | Mod: HCNC | Performed by: INTERNAL MEDICINE

## 2023-11-15 ENCOUNTER — PATIENT MESSAGE (OUTPATIENT)
Dept: INTERNAL MEDICINE | Facility: CLINIC | Age: 88
End: 2023-11-15
Payer: MEDICARE

## 2023-11-16 ENCOUNTER — PATIENT MESSAGE (OUTPATIENT)
Dept: INTERNAL MEDICINE | Facility: CLINIC | Age: 88
End: 2023-11-16
Payer: MEDICARE

## 2023-11-16 LAB — VIT B1 BLD-MCNC: 57 UG/L (ref 38–122)

## 2023-11-17 LAB — PYRIDOXAL SERPL-MCNC: 28 UG/L (ref 5–50)

## 2023-11-30 ENCOUNTER — PATIENT MESSAGE (OUTPATIENT)
Dept: ADMINISTRATIVE | Facility: OTHER | Age: 88
End: 2023-11-30
Payer: MEDICARE

## 2023-12-01 ENCOUNTER — OFFICE VISIT (OUTPATIENT)
Dept: INTERNAL MEDICINE | Facility: CLINIC | Age: 88
End: 2023-12-01
Payer: MEDICARE

## 2023-12-01 VITALS
DIASTOLIC BLOOD PRESSURE: 64 MMHG | HEART RATE: 80 BPM | WEIGHT: 124.13 LBS | OXYGEN SATURATION: 100 % | SYSTOLIC BLOOD PRESSURE: 122 MMHG | HEIGHT: 61 IN | BODY MASS INDEX: 23.43 KG/M2

## 2023-12-01 DIAGNOSIS — E05.00 GRAVES DISEASE: ICD-10-CM

## 2023-12-01 DIAGNOSIS — I10 ESSENTIAL HYPERTENSION: ICD-10-CM

## 2023-12-01 DIAGNOSIS — G60.3 IDIOPATHIC PROGRESSIVE NEUROPATHY: Primary | ICD-10-CM

## 2023-12-01 PROCEDURE — 1159F MED LIST DOCD IN RCRD: CPT | Mod: HCNC,CPTII,S$GLB, | Performed by: INTERNAL MEDICINE

## 2023-12-01 PROCEDURE — 99999 PR PBB SHADOW E&M-EST. PATIENT-LVL III: ICD-10-PCS | Mod: PBBFAC,HCNC,, | Performed by: INTERNAL MEDICINE

## 2023-12-01 PROCEDURE — 3288F FALL RISK ASSESSMENT DOCD: CPT | Mod: HCNC,CPTII,S$GLB, | Performed by: INTERNAL MEDICINE

## 2023-12-01 PROCEDURE — 1101F PT FALLS ASSESS-DOCD LE1/YR: CPT | Mod: HCNC,CPTII,S$GLB, | Performed by: INTERNAL MEDICINE

## 2023-12-01 PROCEDURE — 99999 PR PBB SHADOW E&M-EST. PATIENT-LVL III: CPT | Mod: PBBFAC,HCNC,, | Performed by: INTERNAL MEDICINE

## 2023-12-01 PROCEDURE — 1101F PR PT FALLS ASSESS DOC 0-1 FALLS W/OUT INJ PAST YR: ICD-10-PCS | Mod: HCNC,CPTII,S$GLB, | Performed by: INTERNAL MEDICINE

## 2023-12-01 PROCEDURE — 99214 PR OFFICE/OUTPT VISIT, EST, LEVL IV, 30-39 MIN: ICD-10-PCS | Mod: HCNC,S$GLB,, | Performed by: INTERNAL MEDICINE

## 2023-12-01 PROCEDURE — 1126F PR PAIN SEVERITY QUANTIFIED, NO PAIN PRESENT: ICD-10-PCS | Mod: HCNC,CPTII,S$GLB, | Performed by: INTERNAL MEDICINE

## 2023-12-01 PROCEDURE — 3288F PR FALLS RISK ASSESSMENT DOCUMENTED: ICD-10-PCS | Mod: HCNC,CPTII,S$GLB, | Performed by: INTERNAL MEDICINE

## 2023-12-01 PROCEDURE — 1126F AMNT PAIN NOTED NONE PRSNT: CPT | Mod: HCNC,CPTII,S$GLB, | Performed by: INTERNAL MEDICINE

## 2023-12-01 PROCEDURE — 1159F PR MEDICATION LIST DOCUMENTED IN MEDICAL RECORD: ICD-10-PCS | Mod: HCNC,CPTII,S$GLB, | Performed by: INTERNAL MEDICINE

## 2023-12-01 PROCEDURE — 99214 OFFICE O/P EST MOD 30 MIN: CPT | Mod: HCNC,S$GLB,, | Performed by: INTERNAL MEDICINE

## 2023-12-01 RX ORDER — LANOLIN ALCOHOL/MO/W.PET/CERES
100 CREAM (GRAM) TOPICAL DAILY
COMMUNITY

## 2023-12-01 NOTE — PROGRESS NOTES
Subjective:       Patient ID: Courtney Plummer is a 88 y.o. female.    Chief Complaint: Follow-up    HPI    She is doing well.         PMHx:  Osteoporosis: Severe. Last DEXA was in . Continue alendronate.  Has had  compression fractures in the past.     Graves Disease: On MMI. Followed by endo. Symptoms and labs have been stable.     HTN:now well controlled on lisinopril 10 mg daily '     Anxiety: Was previously on Paxil but has since weaned off. Did not tolerate Lexapro.       Neuropathy: workup unrevealing. Does not want to try gabepentil due to potential side effect.      Pruritis: She had diffuse pruritis for the last year. She has seen Allergy, dermatology and endo for this with no clear cause. Cannot take antihistamines due to sedating effects.       Post concussive syndrome s/p fall in 2022     She has 6 children and just had another grand child and great grandchild!              Review of Systems   Constitutional:  Negative for activity change, appetite change and chills.   HENT:  Negative for ear pain, sinus pressure/congestion and sneezing.    Respiratory:  Negative for cough and shortness of breath.    Cardiovascular:  Negative for chest pain, palpitations and leg swelling.   Gastrointestinal:  Negative for abdominal distention, abdominal pain, constipation, diarrhea, nausea and vomiting.   Genitourinary:  Negative for dysuria and hematuria.   Musculoskeletal:  Negative for arthralgias, back pain and myalgias.   Neurological:  Negative for dizziness and headaches.   Psychiatric/Behavioral:  Negative for agitation.            Past Medical History:   Diagnosis Date    Anxiety     Essential hypertension 2018    Graves disease     Hypertension     Kidney stones 12/3/2015     Past Surgical History:   Procedure Laterality Date    BACK SURGERY      x3     SECTION, CLASSIC       last     COSMETIC SURGERY      EYE SURGERY  2019    Catatact    FRACTURE SURGERY       compression fracture    SPINE SURGERY      THYROID SURGERY        Patient Active Problem List   Diagnosis    BRIAN generalized anxiety disorder    Benign positional vertigo    Carotid bruit    Chronic low back pain    Lumbar spinal stenosis    Panic attacks    Asymptomatic gallstones    Cyst of meniscus of left knee, 04-.    History of back surgery, 09- Dr. 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 2021    At risk for falling    Frequent falls    Senile purpura    Compressed spine fracture    Major depressive disorder, single episode, in partial remission        Objective:      Physical Exam  Constitutional:       Appearance: Normal appearance.   HENT:      Head: Normocephalic.      Right Ear: Tympanic membrane normal.      Left Ear: Tympanic membrane normal.      Nose: Nose normal.   Cardiovascular:      Rate and Rhythm: Normal rate and regular rhythm.      Pulses: Normal pulses.      Heart sounds: Normal heart sounds.   Pulmonary:      Effort: Pulmonary effort is normal.      Breath sounds: Normal breath sounds.   Abdominal:      General: Abdomen is flat. Bowel sounds are normal.      Palpations: Abdomen is soft.   Musculoskeletal:         General: Normal range of motion.      Cervical back: Normal range of motion and neck supple.   Skin:     General: Skin is warm and dry.   Neurological:      General: No focal deficit present.      Mental Status: She  is alert and oriented to person, place, and time.   Psychiatric:         Mood and Affect: Mood normal.         Assessment:       Problem List Items Addressed This Visit          Cardiac/Vascular    Essential hypertension       Endocrine    Graves disease     Other Visit Diagnoses       Idiopathic progressive neuropathy    -  Primary    Relevant Orders    Ambulatory referral/consult to Neurology            Plan:         Courtney was seen today for follow-up.    Diagnoses and all orders for this visit:    Idiopathic progressive neuropathy  -     Ambulatory referral/consult to Neurology; Future  Labs all normal.   Does not wish to start medications for this yet.  Follow up neurology    Graves disease  Recent tsh is normal     Essential hypertension  Well controlled on lisinopril                Jenny Arizmendi MD   Internal Medicine   Primary Care

## 2023-12-29 ENCOUNTER — PATIENT MESSAGE (OUTPATIENT)
Dept: ADMINISTRATIVE | Facility: OTHER | Age: 88
End: 2023-12-29
Payer: MEDICARE

## 2024-01-03 ENCOUNTER — PATIENT MESSAGE (OUTPATIENT)
Dept: INTERNAL MEDICINE | Facility: CLINIC | Age: 89
End: 2024-01-03
Payer: MEDICARE

## 2024-01-16 NOTE — ASSESSMENT & PLAN NOTE
AFib with RVR.  Amiodarone drip.  On Toprol-XL.  Will titrate beta blockers as needed and tolerated   TSH at goal.  She's been on therapy for 2 years and is currently only requiring 2.5 mg daily methimazole. Will stop and repeat labs in 3 months to see if she's in a remission.

## 2024-01-30 ENCOUNTER — LAB VISIT (OUTPATIENT)
Dept: LAB | Facility: HOSPITAL | Age: 89
End: 2024-01-30
Attending: INTERNAL MEDICINE
Payer: MEDICARE

## 2024-01-30 DIAGNOSIS — E05.00 GRAVES DISEASE: ICD-10-CM

## 2024-01-30 LAB
T4 FREE SERPL-MCNC: 0.99 NG/DL (ref 0.71–1.51)
TSH SERPL DL<=0.005 MIU/L-ACNC: 0.65 UIU/ML (ref 0.4–4)

## 2024-01-30 PROCEDURE — 84443 ASSAY THYROID STIM HORMONE: CPT | Mod: HCNC | Performed by: INTERNAL MEDICINE

## 2024-01-30 PROCEDURE — 84439 ASSAY OF FREE THYROXINE: CPT | Mod: HCNC | Performed by: INTERNAL MEDICINE

## 2024-01-30 PROCEDURE — 36415 COLL VENOUS BLD VENIPUNCTURE: CPT | Mod: HCNC | Performed by: INTERNAL MEDICINE

## 2024-04-01 ENCOUNTER — PATIENT MESSAGE (OUTPATIENT)
Dept: INTERNAL MEDICINE | Facility: CLINIC | Age: 89
End: 2024-04-01
Payer: MEDICARE

## 2024-04-01 DIAGNOSIS — M81.0 POST-MENOPAUSAL OSTEOPOROSIS: ICD-10-CM

## 2024-04-02 NOTE — TELEPHONE ENCOUNTER
No care due was identified.  [facility] Embedded Care Due Messages. Reference number: [id number].   4/02/2024 11:13:56 AM CDT

## 2024-04-02 NOTE — TELEPHONE ENCOUNTER
Pt asking if she needs to continue Fosamax, pt states she took her last dose 2 days ago. Chart shows med was discontinued. Pended for your review

## 2024-04-03 ENCOUNTER — TELEPHONE (OUTPATIENT)
Dept: NEUROLOGY | Facility: CLINIC | Age: 89
End: 2024-04-03
Payer: MEDICARE

## 2024-04-03 RX ORDER — ALENDRONATE SODIUM 70 MG/1
TABLET ORAL
Qty: 12 TABLET | Refills: 0 | Status: SHIPPED | OUTPATIENT
Start: 2024-04-03

## 2024-04-04 ENCOUNTER — OFFICE VISIT (OUTPATIENT)
Dept: URGENT CARE | Facility: CLINIC | Age: 89
End: 2024-04-04
Payer: MEDICARE

## 2024-04-04 VITALS
HEART RATE: 74 BPM | SYSTOLIC BLOOD PRESSURE: 181 MMHG | WEIGHT: 124 LBS | HEIGHT: 61 IN | RESPIRATION RATE: 18 BRPM | BODY MASS INDEX: 23.41 KG/M2 | DIASTOLIC BLOOD PRESSURE: 77 MMHG | TEMPERATURE: 97 F

## 2024-04-04 DIAGNOSIS — M54.9 BACK PAIN, UNSPECIFIED BACK LOCATION, UNSPECIFIED BACK PAIN LATERALITY, UNSPECIFIED CHRONICITY: Primary | ICD-10-CM

## 2024-04-04 LAB
BILIRUB UR QL STRIP: NEGATIVE
GLUCOSE UR QL STRIP: NEGATIVE
KETONES UR QL STRIP: NEGATIVE
LEUKOCYTE ESTERASE UR QL STRIP: NEGATIVE
PH, POC UA: 7.5 (ref 5–8)
POC BLOOD, URINE: NEGATIVE
POC NITRATES, URINE: NEGATIVE
PROT UR QL STRIP: NEGATIVE
SP GR UR STRIP: 1 (ref 1–1.03)
UROBILINOGEN UR STRIP-ACNC: NORMAL (ref 0.1–1.1)

## 2024-04-04 PROCEDURE — 81003 URINALYSIS AUTO W/O SCOPE: CPT | Mod: QW,S$GLB,,

## 2024-04-04 PROCEDURE — 99203 OFFICE O/P NEW LOW 30 MIN: CPT | Mod: S$GLB,,,

## 2024-04-04 NOTE — PROGRESS NOTES
"Subjective:      Patient ID: Courtney Plummer is a 88 y.o. female.    Vitals:  height is 5' 1" (1.549 m) and weight is 56.2 kg (124 lb). Her oral temperature is 97 °F (36.1 °C). Her blood pressure is 181/77 (abnormal) and her pulse is 74. Her respiration is 18.     Chief Complaint: Back Pain (Entered by patient)    This is a 88 y.o. female who presents today with a chief complaint of left-sided mid back pain that has been present for 1 week.  Patient denies any injury, fall, or straining of the area.  Patient is left-handed, states she has been spitting a lot of time on the computer.  Pain is worse when she sits for a long time, relieved by standing and walking around or laying down.  She is taken Tylenol for symptoms with some relief.  No other complaints.    Back Pain  This is a new problem. The current episode started in the past 7 days. The problem is unchanged. The quality of the pain is described as shooting and cramping. The pain does not radiate. The pain is at a severity of 8/10. The pain is Worse during the day. The symptoms are aggravated by sitting and standing. Pertinent negatives include no abdominal pain, chest pain, fever or headaches. Treatments tried: tylenol. The treatment provided no relief.       Constitution: Negative for fever and generalized weakness.   HENT:  Negative for ear pain, sinus pain and sore throat.    Neck: Negative for neck pain.   Cardiovascular:  Negative for chest pain.   Respiratory:  Negative for cough and shortness of breath.    Gastrointestinal:  Negative for abdominal pain.   Musculoskeletal:  Positive for back pain (Left mid back).   Neurological:  Negative for headaches.      Objective:     Physical Exam   Constitutional: She is oriented to person, place, and time. She appears well-developed. She is cooperative. No distress.   HENT:   Head: Normocephalic and atraumatic.   Nose: Nose normal.   Mouth/Throat: Oropharynx is clear and moist and mucous membranes are normal. "   Eyes: Conjunctivae and lids are normal.   Neck: Trachea normal and phonation normal. Neck supple.   Cardiovascular: Normal rate, regular rhythm, normal heart sounds and normal pulses.   Pulmonary/Chest: Effort normal and breath sounds normal.   Abdominal: Normal appearance and bowel sounds are normal. She exhibits no mass. Soft. There is no abdominal tenderness. There is no rebound, no guarding, no left CVA tenderness and no right CVA tenderness.   Musculoskeletal:         General: No deformity.      Thoracic back: She exhibits tenderness. She exhibits normal range of motion, no bony tenderness, no swelling and no deformity.      Lumbar back: Normal.   Neurological: She is alert and oriented to person, place, and time. She has normal strength and normal reflexes. No sensory deficit.   Skin: Skin is warm, dry, intact and not diaphoretic.   Psychiatric: Her speech is normal and behavior is normal. Judgment and thought content normal.   Nursing note and vitals reviewed.      Assessment:     1. Back pain, unspecified back location, unspecified back pain laterality, unspecified chronicity        Plan:   Patient is very well-appearing, ambulatory, no acute distress at this time.  On physical exam she has mild left-sided midback pain to palpation, she rates pain 2/10 on visit today.  She states pain is worse after sitting for long periods of time, relieved by walking or lying flat.  Discussed NSAIDs and heating packs for muscle strain to the area, as well as avoiding postures that exacerbate the pain.  No CVA tenderness, dysuria, abdominal pain, chest pain, or shortness a breath.  No pain with inspiration.  Patient will attempt supportive care and follow up with PCP as needed.    Results for orders placed or performed in visit on 04/04/24   POCT Urinalysis, Dipstick, Automated, W/O Scope   Result Value Ref Range    POC Blood, Urine Negative Negative    POC Bilirubin, Urine Negative Negative    POC Urobilinogen, Urine  normal 0.1 - 1.1    POC Ketones, Urine Negative Negative    POC Protein, Urine Negative Negative    POC Nitrates, Urine Negative Negative    POC Glucose, Urine Negative Negative    pH, UA 7.5 5 - 8    POC Specific Gravity, Urine 1.005 1.003 - 1.029    POC Leukocytes, Urine Negative Negative         Back pain, unspecified back location, unspecified back pain laterality, unspecified chronicity  -     POCT Urinalysis, Dipstick, Automated, W/O Scope

## 2024-04-05 ENCOUNTER — TELEPHONE (OUTPATIENT)
Dept: NEUROLOGY | Facility: CLINIC | Age: 89
End: 2024-04-05
Payer: MEDICARE

## 2024-04-05 NOTE — TELEPHONE ENCOUNTER
----- Message from Chel Anton sent at 4/5/2024 10:59 AM CDT -----  Regarding: Follow Up  Hi!    This patient was seen in December of 2022 for dizziness and is c/o of imbalance and dizziness reoccurring. She had a CT scan that showed arachnoid cust. Can I schedule this patient with you?    She called the main line and was inadvertently booked with Beatriz Munguia.   Also had concussion in 2022.     Please advise.     Thanks!  Chel, Access Navigator  [facility]  [facility]

## 2024-04-29 ENCOUNTER — OFFICE VISIT (OUTPATIENT)
Dept: INTERNAL MEDICINE | Facility: CLINIC | Age: 89
End: 2024-04-29
Payer: MEDICARE

## 2024-04-29 ENCOUNTER — LAB VISIT (OUTPATIENT)
Dept: LAB | Facility: HOSPITAL | Age: 89
End: 2024-04-29
Payer: MEDICARE

## 2024-04-29 VITALS
HEART RATE: 90 BPM | SYSTOLIC BLOOD PRESSURE: 137 MMHG | BODY MASS INDEX: 23.79 KG/M2 | OXYGEN SATURATION: 97 % | DIASTOLIC BLOOD PRESSURE: 80 MMHG | WEIGHT: 125.88 LBS

## 2024-04-29 DIAGNOSIS — I77.89 OTHER SPECIFIED DISORDERS OF ARTERIES AND ARTERIOLES: ICD-10-CM

## 2024-04-29 DIAGNOSIS — R42 DIZZINESS: ICD-10-CM

## 2024-04-29 DIAGNOSIS — D69.2 SENILE PURPURA: ICD-10-CM

## 2024-04-29 DIAGNOSIS — I77.9 BILATERAL CAROTID ARTERY DISEASE, UNSPECIFIED TYPE: ICD-10-CM

## 2024-04-29 DIAGNOSIS — R93.819 ABNORMAL RADIOLOGIC FINDINGS ON DIAGNOSTIC IMAGING OF UNSPECIFIED TESTICLE: ICD-10-CM

## 2024-04-29 DIAGNOSIS — R42 DIZZINESS AND GIDDINESS: ICD-10-CM

## 2024-04-29 DIAGNOSIS — F32.4 MAJOR DEPRESSIVE DISORDER, SINGLE EPISODE, IN PARTIAL REMISSION: ICD-10-CM

## 2024-04-29 DIAGNOSIS — R26.89 IMBALANCE: Primary | ICD-10-CM

## 2024-04-29 LAB
ALBUMIN SERPL BCP-MCNC: 4 G/DL (ref 3.5–5.2)
ALP SERPL-CCNC: 55 U/L (ref 55–135)
ALT SERPL W/O P-5'-P-CCNC: 12 U/L (ref 10–44)
ANION GAP SERPL CALC-SCNC: 7 MMOL/L (ref 8–16)
AST SERPL-CCNC: 19 U/L (ref 10–40)
BASOPHILS # BLD AUTO: 0.04 K/UL (ref 0–0.2)
BASOPHILS NFR BLD: 0.8 % (ref 0–1.9)
BILIRUB SERPL-MCNC: 0.4 MG/DL (ref 0.1–1)
BILIRUB UR QL STRIP: NEGATIVE
BUN SERPL-MCNC: 14 MG/DL (ref 8–23)
CALCIUM SERPL-MCNC: 8.7 MG/DL (ref 8.7–10.5)
CHLORIDE SERPL-SCNC: 105 MMOL/L (ref 95–110)
CLARITY UR REFRACT.AUTO: CLEAR
CO2 SERPL-SCNC: 25 MMOL/L (ref 23–29)
COLOR UR AUTO: COLORLESS
CREAT SERPL-MCNC: 0.8 MG/DL (ref 0.5–1.4)
DIFFERENTIAL METHOD BLD: ABNORMAL
EOSINOPHIL # BLD AUTO: 0.2 K/UL (ref 0–0.5)
EOSINOPHIL NFR BLD: 3.1 % (ref 0–8)
ERYTHROCYTE [DISTWIDTH] IN BLOOD BY AUTOMATED COUNT: 18 % (ref 11.5–14.5)
EST. GFR  (NO RACE VARIABLE): >60 ML/MIN/1.73 M^2
GLUCOSE SERPL-MCNC: 91 MG/DL (ref 70–110)
GLUCOSE UR QL STRIP: NEGATIVE
HCT VFR BLD AUTO: 35.8 % (ref 37–48.5)
HGB BLD-MCNC: 11.6 G/DL (ref 12–16)
HGB UR QL STRIP: NEGATIVE
IMM GRANULOCYTES # BLD AUTO: 0.01 K/UL (ref 0–0.04)
IMM GRANULOCYTES NFR BLD AUTO: 0.2 % (ref 0–0.5)
KETONES UR QL STRIP: NEGATIVE
LEUKOCYTE ESTERASE UR QL STRIP: NEGATIVE
LYMPHOCYTES # BLD AUTO: 1.7 K/UL (ref 1–4.8)
LYMPHOCYTES NFR BLD: 33.1 % (ref 18–48)
MCH RBC QN AUTO: 28.9 PG (ref 27–31)
MCHC RBC AUTO-ENTMCNC: 32.4 G/DL (ref 32–36)
MCV RBC AUTO: 89 FL (ref 82–98)
MONOCYTES # BLD AUTO: 0.7 K/UL (ref 0.3–1)
MONOCYTES NFR BLD: 13.3 % (ref 4–15)
NEUTROPHILS # BLD AUTO: 2.6 K/UL (ref 1.8–7.7)
NEUTROPHILS NFR BLD: 49.5 % (ref 38–73)
NITRITE UR QL STRIP: NEGATIVE
NRBC BLD-RTO: 0 /100 WBC
PH UR STRIP: 7 [PH] (ref 5–8)
PLATELET # BLD AUTO: 198 K/UL (ref 150–450)
PMV BLD AUTO: 11.8 FL (ref 9.2–12.9)
POTASSIUM SERPL-SCNC: 4.3 MMOL/L (ref 3.5–5.1)
PROT SERPL-MCNC: 6.5 G/DL (ref 6–8.4)
PROT UR QL STRIP: NEGATIVE
RBC # BLD AUTO: 4.01 M/UL (ref 4–5.4)
SODIUM SERPL-SCNC: 137 MMOL/L (ref 136–145)
SP GR UR STRIP: 1.01 (ref 1–1.03)
TSH SERPL DL<=0.005 MIU/L-ACNC: 0.43 UIU/ML (ref 0.4–4)
URN SPEC COLLECT METH UR: ABNORMAL
WBC # BLD AUTO: 5.19 K/UL (ref 3.9–12.7)

## 2024-04-29 PROCEDURE — 81003 URINALYSIS AUTO W/O SCOPE: CPT | Performed by: INTERNAL MEDICINE

## 2024-04-29 PROCEDURE — 3288F FALL RISK ASSESSMENT DOCD: CPT | Mod: CPTII,S$GLB,, | Performed by: INTERNAL MEDICINE

## 2024-04-29 PROCEDURE — 80053 COMPREHEN METABOLIC PANEL: CPT | Performed by: INTERNAL MEDICINE

## 2024-04-29 PROCEDURE — 84443 ASSAY THYROID STIM HORMONE: CPT | Performed by: INTERNAL MEDICINE

## 2024-04-29 PROCEDURE — 1101F PT FALLS ASSESS-DOCD LE1/YR: CPT | Mod: CPTII,S$GLB,, | Performed by: INTERNAL MEDICINE

## 2024-04-29 PROCEDURE — 99214 OFFICE O/P EST MOD 30 MIN: CPT | Mod: S$GLB,,, | Performed by: INTERNAL MEDICINE

## 2024-04-29 PROCEDURE — 85025 COMPLETE CBC W/AUTO DIFF WBC: CPT | Performed by: INTERNAL MEDICINE

## 2024-04-29 PROCEDURE — 1159F MED LIST DOCD IN RCRD: CPT | Mod: CPTII,S$GLB,, | Performed by: INTERNAL MEDICINE

## 2024-04-29 PROCEDURE — 36415 COLL VENOUS BLD VENIPUNCTURE: CPT | Performed by: INTERNAL MEDICINE

## 2024-04-29 PROCEDURE — 99999 PR PBB SHADOW E&M-EST. PATIENT-LVL IV: CPT | Mod: PBBFAC,,, | Performed by: INTERNAL MEDICINE

## 2024-04-29 NOTE — PROGRESS NOTES
"Subjective:       Patient ID: Courtney Plummer is a 89 y.o. female.    Chief Complaint: Follow-up    Presents for follow up.     She continues to feel dizzy. She states she feels "fuzzy" and off balance. She states she feels uneasy. At times feels unsafe walking but also states she goes for a walk in her neighborhood daily.   We have changed her BP meds multiple times over the last 2 years for this.   She was also evaluated by neurology on October for post concussive syndrome after hitting her head. Had a normal CT brain.     PMHx:  Osteoporosis: Severe. Last DEXA was in 2021. Continue alendronate.  Has had  compression fractures in the past.     Graves Disease: On MMI. Followed by endo. Symptoms and labs have been stable.     HTN:now well controlled on lisinopril 10 mg daily '     Anxiety: Was previously on Paxil but has since weaned off. Did not tolerate Lexapro.       Neuropathy: workup unrevealing. Does not want to try gabepentil due to potential side effect.      Pruritis: She had diffuse pruritis for the last year. She has seen Allergy, dermatology and endo for this with no clear cause. Cannot take antihistamines due to sedating effects.       Post concussive syndrome s/p fall in October 2022/ seen by neuro     She has 6 children and just had another grand child and great grandchild!                 Follow-up  Pertinent negatives include no abdominal pain, arthralgias, chest pain, chills, coughing, headaches, myalgias, nausea or vomiting.          Review of Systems   Constitutional:  Negative for activity change, appetite change and chills.   HENT:  Negative for ear pain, sinus pressure/congestion and sneezing.    Respiratory:  Negative for cough and shortness of breath.    Cardiovascular:  Negative for chest pain, palpitations and leg swelling.   Gastrointestinal:  Negative for abdominal distention, abdominal pain, constipation, diarrhea, nausea and vomiting.   Genitourinary:  Negative for dysuria and " hematuria.   Musculoskeletal:  Negative for arthralgias, back pain and myalgias.   Neurological:  Negative for dizziness and headaches.   Psychiatric/Behavioral:  Negative for agitation.            Past Medical History:   Diagnosis Date    Anxiety     Essential hypertension 2018    Graves disease     Hypertension     Kidney stones 12/3/2015     Past Surgical History:   Procedure Laterality Date    BACK SURGERY      x3     SECTION, CLASSIC       last     COSMETIC SURGERY      EYE SURGERY  2019    Catatact    FRACTURE SURGERY      compression fracture    SPINE SURGERY      THYROID SURGERY        Patient Active Problem List   Diagnosis    BRIAN generalized anxiety disorder    Benign positional vertigo    Carotid bruit    Chronic low back pain    Lumbar spinal stenosis    Panic attacks    Asymptomatic gallstones    Cyst of meniscus of left knee, 2014.    History of back surgery, 2014 Dr. 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     Osteoporosis, post-menopausal    Postmenopausal status    Essential hypertension    Idiopathic peripheral neuropathy, since , back surgery    Atherosclerosis of aorta    Recurrent UTI    Compression fracture of T12 vertebra, s/p kyphoplasty     Sensation of lump in throat    Former smoker    Compression fracture of T12 vertebra with routine healing    Nocturia, x 3-4     Weight loss, non-intentional, anxiety related    Carotid arterial disease    Graves disease    Intense itching since 2021    At risk for falling    Frequent falls    Senile purpura    Compressed spine fracture    Major depressive disorder, single episode, in partial remission        Objective:      Physical Exam  Constitutional:       Appearance: Normal appearance.   HENT:      Head: Normocephalic.      Right Ear: Tympanic membrane normal.      Left Ear: Tympanic membrane normal.      Nose: Nose normal.    Cardiovascular:      Rate and Rhythm: Normal rate and regular rhythm.      Pulses: Normal pulses.      Heart sounds: Normal heart sounds.   Pulmonary:      Effort: Pulmonary effort is normal.      Breath sounds: Normal breath sounds.   Abdominal:      General: Abdomen is flat. Bowel sounds are normal.      Palpations: Abdomen is soft.   Musculoskeletal:         General: Normal range of motion.      Cervical back: Normal range of motion and neck supple.   Skin:     General: Skin is warm and dry.   Neurological:      General: No focal deficit present.      Mental Status: She is alert and oriented to person, place, and time.   Psychiatric:         Mood and Affect: Mood normal.         Assessment:       Problem List Items Addressed This Visit          Cardiac/Vascular    Carotid arterial disease    Relevant Orders    CV Ultrasound Bilateral Doppler Carotid     Other Visit Diagnoses       Imbalance    -  Primary    Relevant Orders    Ambulatory referral/consult to Physical/Occupational Therapy    Dizziness        Relevant Orders    COMPREHENSIVE METABOLIC PANEL (Completed)    CBC W/ AUTO DIFFERENTIAL (Completed)    TSH (Completed)    Urinalysis, Reflex to Urine Culture Urine, Clean Catch (Completed)    Echo    Dizziness and giddiness        Relevant Orders    MRI Brain Without Contrast    Abnormal radiologic findings on diagnostic imaging of unspecified testicle        Relevant Orders    TSH (Completed)    Other specified disorders of arteries and arterioles        Relevant Orders    CV Ultrasound Bilateral Doppler Carotid              Plan:         Courtney was seen today for follow-up.    Diagnoses and all orders for this visit:    Imbalance  -     Ambulatory referral/consult to Physical/Occupational Therapy; Future; Expected date: 05/06/2024    Dizziness  -     COMPREHENSIVE METABOLIC PANEL; Future; Expected date: 04/29/2024  -     CBC W/ AUTO DIFFERENTIAL; Future; Expected date: 04/29/2024  -     TSH; Future;  "Expected date: 04/29/2024  -     Urinalysis, Reflex to Urine Culture Urine, Clean Catch  -     Echo; Future    Dizziness and giddiness  -     MRI Brain Without Contrast; Future; Expected date: 04/29/2024    Bilateral carotid artery disease, unspecified type  -     CV Ultrasound Bilateral Doppler Carotid; Future          Will order above imaging and check labs.   Will also have her follow up with neurology.   Unclear cause of dizziness and brain fog at the time.   Will start PT to help with balance.     Depression: now off all SSRIs as they made her feel "off"    Senile purpura: so issues with bleeding.           Jenny Arizmendi MD   Internal Medicine   Primary Care      "

## 2024-04-30 ENCOUNTER — TELEPHONE (OUTPATIENT)
Dept: INTERNAL MEDICINE | Facility: CLINIC | Age: 89
End: 2024-04-30
Payer: MEDICARE

## 2024-05-01 ENCOUNTER — HOSPITAL ENCOUNTER (OUTPATIENT)
Dept: CARDIOLOGY | Facility: HOSPITAL | Age: 89
Discharge: HOME | End: 2024-05-01
Attending: INTERNAL MEDICINE
Payer: MEDICARE

## 2024-05-01 DIAGNOSIS — I77.89 OTHER SPECIFIED DISORDERS OF ARTERIES AND ARTERIOLES: ICD-10-CM

## 2024-05-01 DIAGNOSIS — I77.9 BILATERAL CAROTID ARTERY DISEASE, UNSPECIFIED TYPE: ICD-10-CM

## 2024-05-01 LAB
LEFT CBA DIAS: 7 CM/S
LEFT CBA SYS: 51 CM/S
LEFT CCA DIST DIAS: 9 CM/S
LEFT CCA DIST SYS: 68 CM/S
LEFT CCA MID DIAS: 9 CM/S
LEFT CCA MID SYS: 70 CM/S
LEFT CCA PROX DIAS: 9 CM/S
LEFT CCA PROX SYS: 82 CM/S
LEFT ECA DIAS: 0 CM/S
LEFT ECA SYS: 75 CM/S
LEFT ICA DIST DIAS: 18 CM/S
LEFT ICA DIST SYS: 119 CM/S
LEFT ICA MID DIAS: 27 CM/S
LEFT ICA MID SYS: 144 CM/S
LEFT ICA PROX DIAS: 12 CM/S
LEFT ICA PROX SYS: 65 CM/S
LEFT VERTEBRAL DIAS: 9 CM/S
LEFT VERTEBRAL SYS: 54 CM/S
OHS CV CAROTID RIGHT ICA EDV HIGHEST: 21
OHS CV CAROTID ULTRASOUND LEFT ICA/CCA RATIO: 2.12
OHS CV CAROTID ULTRASOUND RIGHT ICA/CCA RATIO: 2.06
OHS CV PV CAROTID LEFT HIGHEST CCA: 82
OHS CV PV CAROTID LEFT HIGHEST ICA: 144
OHS CV PV CAROTID RIGHT HIGHEST CCA: 69
OHS CV PV CAROTID RIGHT HIGHEST ICA: 142
OHS CV US CAROTID LEFT HIGHEST EDV: 27
RIGHT ARM DIASTOLIC BLOOD PRESSURE: 74 MMHG
RIGHT ARM SYSTOLIC BLOOD PRESSURE: 122 MMHG
RIGHT CBA DIAS: 9 CM/S
RIGHT CBA SYS: 64 CM/S
RIGHT CCA DIST DIAS: 12 CM/S
RIGHT CCA DIST SYS: 69 CM/S
RIGHT CCA MID DIAS: 9 CM/S
RIGHT CCA MID SYS: 64 CM/S
RIGHT CCA PROX DIAS: 13 CM/S
RIGHT CCA PROX SYS: 67 CM/S
RIGHT ECA DIAS: 0 CM/S
RIGHT ECA SYS: 106 CM/S
RIGHT ICA DIST DIAS: 17 CM/S
RIGHT ICA DIST SYS: 142 CM/S
RIGHT ICA MID DIAS: 21 CM/S
RIGHT ICA MID SYS: 127 CM/S
RIGHT ICA PROX DIAS: 12 CM/S
RIGHT ICA PROX SYS: 94 CM/S
RIGHT VERTEBRAL DIAS: 7 CM/S
RIGHT VERTEBRAL SYS: 42 CM/S

## 2024-05-01 PROCEDURE — 93880 EXTRACRANIAL BILAT STUDY: CPT | Mod: 26,,, | Performed by: INTERNAL MEDICINE

## 2024-05-01 PROCEDURE — 93880 EXTRACRANIAL BILAT STUDY: CPT | Mod: HCNC

## 2024-05-06 ENCOUNTER — HOSPITAL ENCOUNTER (OUTPATIENT)
Dept: CARDIOLOGY | Facility: HOSPITAL | Age: 89
Discharge: HOME | End: 2024-05-06
Attending: INTERNAL MEDICINE
Payer: MEDICARE

## 2024-05-06 VITALS
WEIGHT: 125 LBS | DIASTOLIC BLOOD PRESSURE: 64 MMHG | BODY MASS INDEX: 23.6 KG/M2 | HEART RATE: 73 BPM | SYSTOLIC BLOOD PRESSURE: 162 MMHG | HEIGHT: 61 IN

## 2024-05-06 DIAGNOSIS — R42 DIZZINESS: ICD-10-CM

## 2024-05-06 LAB
ASCENDING AORTA: 2.69 CM
AV INDEX (PROSTH): 0.61
AV MEAN GRADIENT: 6 MMHG
AV PEAK GRADIENT: 12 MMHG
AV VALVE AREA BY VELOCITY RATIO: 1.67 CM²
AV VALVE AREA: 1.68 CM²
AV VELOCITY RATIO: 0.61
BSA FOR ECHO PROCEDURE: 1.56 M2
CV ECHO LV RWT: 0.37 CM
DOP CALC AO PEAK VEL: 1.74 M/S
DOP CALC AO VTI: 34.58 CM
DOP CALC LVOT AREA: 2.7 CM2
DOP CALC LVOT DIAMETER: 1.87 CM
DOP CALC LVOT PEAK VEL: 1.06 M/S
DOP CALC LVOT STROKE VOLUME: 58.22 CM3
DOP CALC MV VTI: 20.05 CM
DOP CALCLVOT PEAK VEL VTI: 21.21 CM
E WAVE DECELERATION TIME: 240.32 MSEC
E/A RATIO: 0.7
ECHO LV POSTERIOR WALL: 0.87 CM (ref 0.6–1.1)
FRACTIONAL SHORTENING: 37 % (ref 28–44)
HR MV ECHO: 73 BPM
INTERVENTRICULAR SEPTUM: 0.62 CM (ref 0.6–1.1)
IVRT: 54.23 MSEC
LA MAJOR: 5.5 CM
LA MINOR: 5.35 CM
LA WIDTH: 3.52 CM
LEFT ATRIUM SIZE: 3.59 CM
LEFT ATRIUM VOLUME INDEX MOD: 22.7 ML/M2
LEFT ATRIUM VOLUME INDEX: 37.6 ML/M2
LEFT ATRIUM VOLUME MOD: 35.16 CM3
LEFT ATRIUM VOLUME: 58.26 CM3
LEFT INTERNAL DIMENSION IN SYSTOLE: 2.96 CM (ref 2.1–4)
LEFT VENTRICLE DIASTOLIC VOLUME INDEX: 66.65 ML/M2
LEFT VENTRICLE DIASTOLIC VOLUME: 103.3 ML
LEFT VENTRICLE MASS INDEX: 72 G/M2
LEFT VENTRICLE SYSTOLIC VOLUME INDEX: 21.8 ML/M2
LEFT VENTRICLE SYSTOLIC VOLUME: 33.75 ML
LEFT VENTRICULAR INTERNAL DIMENSION IN DIASTOLE: 4.72 CM (ref 3.5–6)
LEFT VENTRICULAR MASS: 112.36 G
MV A" WAVE DURATION": 10.85 MSEC
MV MEAN GRADIENT: 1 MMHG
MV PEAK A VEL: 1.55 M/S
MV PEAK E VEL: 1.08 M/S
MV PEAK GRADIENT: 4 MMHG
MV STENOSIS PRESSURE HALF TIME: 69.69 MS
MV VALVE AREA BY CONTINUITY EQUATION: 2.9 CM2
MV VALVE AREA P 1/2 METHOD: 3.16 CM2
OHS CV RV/LV RATIO: 0.66 CM
PISA MRMAX VEL: 0.06 M/S
PISA TR MAX VEL: 2.36 M/S
PULM VEIN S/D RATIO: 0.98
PV PEAK D VEL: 0.52 M/S
PV PEAK S VEL: 0.51 M/S
RA MAJOR: 4.62 CM
RA PRESSURE ESTIMATED: 3 MMHG
RA WIDTH: 3.13 CM
RIGHT VENTRICULAR END-DIASTOLIC DIMENSION: 3.11 CM
RV TB RVSP: 5 MMHG
SINUS: 2.4 CM
STJ: 2.17 CM
TR MAX PG: 22 MMHG
TRICUSPID ANNULAR PLANE SYSTOLIC EXCURSION: 2.62 CM
TV REST PULMONARY ARTERY PRESSURE: 25 MMHG
Z-SCORE OF LEFT VENTRICULAR DIMENSION IN END DIASTOLE: 0.51
Z-SCORE OF LEFT VENTRICULAR DIMENSION IN END SYSTOLE: 0.48

## 2024-05-06 PROCEDURE — 93306 TTE W/DOPPLER COMPLETE: CPT | Mod: 26,,, | Performed by: INTERNAL MEDICINE

## 2024-05-06 PROCEDURE — 93306 TTE W/DOPPLER COMPLETE: CPT

## 2024-05-14 ENCOUNTER — PATIENT MESSAGE (OUTPATIENT)
Dept: INTERNAL MEDICINE | Facility: CLINIC | Age: 89
End: 2024-05-14
Payer: MEDICARE

## 2024-05-14 DIAGNOSIS — I05.8 MITRAL VALVE SCLEROSIS: Primary | ICD-10-CM

## 2024-05-16 ENCOUNTER — OFFICE VISIT (OUTPATIENT)
Dept: CARDIOLOGY | Facility: CLINIC | Age: 89
End: 2024-05-16
Payer: MEDICARE

## 2024-05-16 VITALS
DIASTOLIC BLOOD PRESSURE: 53 MMHG | WEIGHT: 121.25 LBS | SYSTOLIC BLOOD PRESSURE: 117 MMHG | OXYGEN SATURATION: 96 % | HEART RATE: 77 BPM | HEIGHT: 61 IN | BODY MASS INDEX: 22.89 KG/M2

## 2024-05-16 DIAGNOSIS — E78.5 HYPERLIPIDEMIA, UNSPECIFIED HYPERLIPIDEMIA TYPE: ICD-10-CM

## 2024-05-16 DIAGNOSIS — I05.8 MITRAL VALVE SCLEROSIS: Primary | ICD-10-CM

## 2024-05-16 DIAGNOSIS — I10 ESSENTIAL HYPERTENSION: ICD-10-CM

## 2024-05-16 DIAGNOSIS — I65.23 BILATERAL CAROTID ARTERY STENOSIS: ICD-10-CM

## 2024-05-16 DIAGNOSIS — I70.0 ATHEROSCLEROSIS OF AORTA: ICD-10-CM

## 2024-05-16 DIAGNOSIS — Z87.891 FORMER SMOKER: ICD-10-CM

## 2024-05-16 PROCEDURE — 1126F AMNT PAIN NOTED NONE PRSNT: CPT | Mod: HCNC,CPTII,GC,S$GLB | Performed by: STUDENT IN AN ORGANIZED HEALTH CARE EDUCATION/TRAINING PROGRAM

## 2024-05-16 PROCEDURE — 99203 OFFICE O/P NEW LOW 30 MIN: CPT | Mod: HCNC,GC,S$GLB, | Performed by: STUDENT IN AN ORGANIZED HEALTH CARE EDUCATION/TRAINING PROGRAM

## 2024-05-16 PROCEDURE — 1159F MED LIST DOCD IN RCRD: CPT | Mod: HCNC,CPTII,GC,S$GLB | Performed by: STUDENT IN AN ORGANIZED HEALTH CARE EDUCATION/TRAINING PROGRAM

## 2024-05-16 PROCEDURE — 99999 PR PBB SHADOW E&M-EST. PATIENT-LVL IV: CPT | Mod: PBBFAC,HCNC,GC, | Performed by: STUDENT IN AN ORGANIZED HEALTH CARE EDUCATION/TRAINING PROGRAM

## 2024-05-16 RX ORDER — EZETIMIBE 10 MG/1
10 TABLET ORAL DAILY
Qty: 90 TABLET | Refills: 3 | Status: SHIPPED | OUTPATIENT
Start: 2024-05-16 | End: 2025-05-16

## 2024-05-16 RX ORDER — ROSUVASTATIN CALCIUM 5 MG/1
5 TABLET, COATED ORAL NIGHTLY
Qty: 90 TABLET | Refills: 3 | Status: SHIPPED | OUTPATIENT
Start: 2024-05-16 | End: 2025-05-16

## 2024-05-16 RX ORDER — METOPROLOL SUCCINATE 25 MG/1
12.5 TABLET, EXTENDED RELEASE ORAL DAILY
Qty: 45 TABLET | Refills: 3 | Status: SHIPPED | OUTPATIENT
Start: 2024-05-16 | End: 2025-05-16

## 2024-05-16 NOTE — PROGRESS NOTES
PCP - Jenny Arizmendi MD    Subjective:   Patient ID:  Courtney Plummer is a 89 y.o. female who presents for evaluation of Hypertension and recent echo findings.      Referring physician: Dr. Jenny Arizmendi     HPI    Mrs. Plummer has a history of hypertension with intolerance to some bp medications such as acei/arbs and amlodipine. She describes feeling uneasy and off balance. Symptoms are not related to postural changes. She is currently taking lisinopril 10mg with same side effects but less intense. Her blood pressure has been well controlled and followed closely by her PCP. An echo was performed as part of her work up for dizziness which showed a normal LVEF, severe mitral annular calcification with no stenosis and mild MR. Patient denies any symptoms such as chest pain, mahoney, orthopnea, edema, palpitations, syncope. She does not have any family hx of premature CAD.       TTE 5/6/2024    Left Ventricle: The left ventricle is normal in size. Normal wall thickness. There is normal systolic function with a visually estimated ejection fraction of 60 - 65%. Diastolic function cannot be reliably determined in the presence of mitral annular calcification.    Right Ventricle: Normal right ventricular cavity size. Wall thickness is normal. Systolic function is normal.    Left Atrium: Left atrium is moderately dilated.    Aortic Valve: The aortic valve is a trileaflet valve. There is mild aortic valve sclerosis.    Mitral Valve: There is severe mitral annular calcification present. There is no stenosis. The mean pressure gradient across the mitral valve is 1 mmHg at a heart rate of 73 bpm. There is mild regurgitation.    Tricuspid Valve: There is mild regurgitation.    Pulmonic Valve: There is no stenosis. There is mild regurgitation.    Pulmonary Artery: No pulmonary hypertension. The estimated pulmonary artery systolic pressure is 25 mmHg.    IVC/SVC: Normal venous pressure at 3 mmHg.    Carotid US  2024      There is 40-49% right Internal Carotid Stenosis.    There is 40-49% left Internal Carotid Stenosis.    Past Medical History:   Diagnosis Date    Anxiety     Essential hypertension 2018    Graves disease     Hypertension     Kidney stones 12/3/2015       Past Surgical History:   Procedure Laterality Date    BACK SURGERY      x3     SECTION, CLASSIC       last     COSMETIC SURGERY      EYE SURGERY  2019    Catatact    FRACTURE SURGERY      compression fracture    SPINE SURGERY      THYROID SURGERY         Current Outpatient Medications on File Prior to Visit   Medication Sig Dispense Refill    alendronate (FOSAMAX) 70 MG tablet TAKE 1 TABLET EVERY 7 DAYS. TAKE WITH LARGE GLASS OF WATER. DO NOT LIE FLAT FOR 2 HOURS AFTER TAKING MED 12 tablet 0    biotin 5,000 mcg Subl 15,000mcg      COLLAGEN MISC Take 8,000 mg by mouth once daily.      cyanocobalamin (VITAMIN B-12) 1000 MCG tablet Take 100 mcg by mouth once daily.      lisinopriL 10 MG tablet Take 1 tablet (10 mg total) by mouth once daily. 90 tablet 3    magnesium oxide (MAG-OX) 400 mg tablet Take 400 mg by mouth once daily.       mv-min/iron/folic/calcium/vitK (WOMEN'S MULTIVITAMIN ORAL)       potassium gluconate 595 mg (99 mg) Tab Take by mouth once daily.       VITAMIN D3 1,000 unit capsule Take 1,000 Units by mouth once daily.       vitamin E, dl,tocopheryl acet, (VITAMIN E, DL, ACETATE,) 180 mg (400 unit) Cap        Current Facility-Administered Medications on File Prior to Visit   Medication Dose Route Frequency Provider Last Rate Last Admin    ondansetron disintegrating tablet 4 mg  4 mg Oral Once PRN Nickolas Winn MD           Review of patient's allergies indicates:   Allergen Reactions    Sulfa (sulfonamide antibiotics) Anaphylaxis    Macrobid [nitrofurantoin monohyd/m-cryst]      Fever, joint pain    Codeine Anxiety       Social History     Tobacco Use    Smoking status: Former     Current packs/day: 0.00     " Average packs/day: 1 pack/day for 15.0 years (15.0 ttl pk-yrs)     Types: Cigarettes     Start date: 1954     Quit date: 1969     Years since quittin.4    Smokeless tobacco: Never   Substance Use Topics    Alcohol use: Yes     Alcohol/week: 5.0 standard drinks of alcohol     Types: 4 Glasses of wine, 1 Drinks containing 0.5 oz of alcohol per week     Comment: occasionally    Drug use: Never       Family History   Problem Relation Name Age of Onset    Heart disease Father Elroy Alejon     Heart disease Brother Elroy     Cancer Brother Elroy         Melanoma    Depression Mother Ramya     No Known Problems Daughter      No Known Problems Son      No Known Problems Daughter      No Known Problems Daughter      No Known Problems Son      No Known Problems Son      Alcohol abuse Brother Angel     Hearing loss Brother Angel         I also wear nearing aids    Depression Sister Lisy     Breast cancer Neg Hx      Colon cancer Neg Hx      Ovarian cancer Neg Hx           Review of Systems   Constitutional: Negative for malaise/fatigue.   Eyes:  Negative for double vision and photophobia.   Musculoskeletal:  Negative for muscle weakness and myalgias.   Psychiatric/Behavioral:  Negative for substance abuse. The patient is not nervous/anxious.    All other systems reviewed and are negative.       Objective:     Vitals:    24 1108   BP: (!) 117/53   Pulse: 77   SpO2: 96%   Weight: 55 kg (121 lb 4.1 oz)   Height: 5' 1" (1.549 m)        Physical Exam  Vitals and nursing note reviewed.   Constitutional:       Appearance: Normal appearance. She is normal weight.   HENT:      Head: Normocephalic and atraumatic.      Nose: No congestion.      Mouth/Throat:      Mouth: Mucous membranes are moist.      Pharynx: Oropharynx is clear.   Eyes:      Extraocular Movements: Extraocular movements intact.      Conjunctiva/sclera: Conjunctivae normal.   Neck:      Vascular: No carotid bruit.   Cardiovascular:      Rate " "and Rhythm: Normal rate and regular rhythm.      Pulses: Normal pulses.      Heart sounds: No murmur heard.  Pulmonary:      Effort: Pulmonary effort is normal. No respiratory distress.      Breath sounds: Normal breath sounds. No rhonchi.   Abdominal:      General: Abdomen is flat. There is no distension.      Palpations: Abdomen is soft.   Musculoskeletal:         General: No swelling or tenderness. Normal range of motion.      Cervical back: Normal range of motion.   Skin:     General: Skin is warm and dry.      Capillary Refill: Capillary refill takes less than 2 seconds.   Neurological:      General: No focal deficit present.      Mental Status: She is alert and oriented to person, place, and time.      Motor: No weakness.   Psychiatric:         Mood and Affect: Mood normal.         Behavior: Behavior normal.           Labs:     Lab Results   Component Value Date     04/29/2024    K 4.3 04/29/2024     04/29/2024    CO2 25 04/29/2024    BUN 14 04/29/2024    CREATININE 0.8 04/29/2024    GLUCOSE 93 10/01/2020    ANIONGAP 7 (L) 04/29/2024     No results found for: "HGBA1C"  Lab Results   Component Value Date    BNP 87 11/08/2022       Lab Results   Component Value Date    WBC 5.19 04/29/2024    HGB 11.6 (L) 04/29/2024    HCT 35.8 (L) 04/29/2024    HCT 40 02/20/2023     04/29/2024    GRAN 2.6 04/29/2024    GRAN 49.5 04/29/2024     Lab Results   Component Value Date    CHOL 202 (H) 10/10/2022    HDL 67 10/10/2022    LDLCALC 121.6 10/10/2022    TRIG 67 10/10/2022       Lab Results   Component Value Date     04/29/2024    K 4.3 04/29/2024     04/29/2024    CO2 25 04/29/2024    BUN 14 04/29/2024    CREATININE 0.8 04/29/2024    GLUCOSE 93 10/01/2020    ANIONGAP 7 (L) 04/29/2024     No results found for: "HGBA1C"  Lab Results   Component Value Date    BNP 87 11/08/2022    Lab Results   Component Value Date    WBC 5.19 04/29/2024    HGB 11.6 (L) 04/29/2024    HCT 35.8 (L) 04/29/2024    HCT 40 " 02/20/2023     04/29/2024    GRAN 2.6 04/29/2024    GRAN 49.5 04/29/2024     Lab Results   Component Value Date    CHOL 202 (H) 10/10/2022    HDL 67 10/10/2022    LDLCALC 121.6 10/10/2022    TRIG 67 10/10/2022                Cardiovascular Imaging:     Echo:   EF   Date Value Ref Range Status   11/09/2022 65 % Final     TTE 5/6/2024    Left Ventricle: The left ventricle is normal in size. Normal wall thickness. There is normal systolic function with a visually estimated ejection fraction of 60 - 65%. Diastolic function cannot be reliably determined in the presence of mitral annular calcification.    Right Ventricle: Normal right ventricular cavity size. Wall thickness is normal. Systolic function is normal.    Left Atrium: Left atrium is moderately dilated.    Aortic Valve: The aortic valve is a trileaflet valve. There is mild aortic valve sclerosis.    Mitral Valve: There is severe mitral annular calcification present. There is no stenosis. The mean pressure gradient across the mitral valve is 1 mmHg at a heart rate of 73 bpm. There is mild regurgitation.    Tricuspid Valve: There is mild regurgitation.    Pulmonic Valve: There is no stenosis. There is mild regurgitation.    Pulmonary Artery: No pulmonary hypertension. The estimated pulmonary artery systolic pressure is 25 mmHg.    IVC/SVC: Normal venous pressure at 3 mmHg.         Assessment & Plan:     1. Mitral valve sclerosis    2. Essential hypertension    3. Atherosclerosis of aorta    4. Bilateral carotid artery stenosis    5. Former smoker    6. Hyperlipidemia, unspecified hyperlipidemia type      Plan:    Mitral valve sclerosis: severe mitral annular calcification with no stenosis and mild regurgitation on most recent echo. Given that presence of MAC increases clinical concern for concomitant coronary and atherosclerotic disease we would like to address potential modifiable risk factors such as hyperlipidemia and hypertension. Given that patient  would like to try a different medication to manage her blood pressure and in the presence of MAC she would benefit from beta blockers to help slow down heart rate and lengthen diastolic filling duration for which would like to start her on a small dose of toprol 12.5mg while monitoring any potential side effects. Will also start her on lipid lowering meds described below. If patient develops new onset heart failure, sob, chest pain then will repeat at that time an echocardiogram otherwise routine echo is not warranted.     Essential HTN: currently on lisinopril 10mg with some side effects. While started toprol advised to cut back on lisinopril and monitor her blood pressure. If toprol tolerated well then we discussed potentially discontinuing her lisinopril if blood pressure remained controlled.     Atherosclerosis of aorta: will address modifiable risk factors.   BL carotid artery stenosis: 40-49% bl carotid stenosis on most recent study. Patient will be started on lipid lowering medication.  Former smoker    HLD: last lipid panel done in 2022 w/ an LDL of 121 not on lipid lowering medications. With known severe mitral annular calcification and given that lipid lowering medications have showed to be as effective in reducing cardiovascular events as it is in patients younger than 75yrs we would like to add a statin and zetia. In 2 months will repeat lipid panel and adjust medications as needed. Also encouraged lifestyle modification changes such as a heart healthy diet and to start exercising as tolerated.         - If myalgias, muscle weakness is experience will add coenzyme Q. If intolerant to statin will consider red yeast rice.     Plan discussed with Dr. Bledsoe. Advised patient to contact us if she experiences any side effects with medication adjustments. Would like to see her back in 3 months with follow labs.     Signed:  Domingo Jain M.D.  Cardiovascular Fellow  [facility]

## 2024-05-23 ENCOUNTER — TELEPHONE (OUTPATIENT)
Dept: NEUROLOGY | Facility: CLINIC | Age: 89
End: 2024-05-23
Payer: MEDICARE

## 2024-05-23 NOTE — TELEPHONE ENCOUNTER
"Called Pt to let her know her appt with Dr. Moreno was scheduled incorrectly and has been canceled. I was unable to speak with her but left a vm.     Before scheduling her next appt, Dr. Moreno would like to know: "Can we please ask her if she has a new injury since 10/2022? Her chart is very unclear if she has had a head injury since then. Can we please also ask her if this is just for dizziness or does she have other injury symptoms, and if the answer is just dizziness then would follow up in neuromuscular clinic given she has neuropathy too, which can contribute to dizziness. If she has had a more recent injury or has injury symptoms, then I can see her in concussion clinic please.".     "

## 2024-05-31 ENCOUNTER — PATIENT MESSAGE (OUTPATIENT)
Dept: ADMINISTRATIVE | Facility: OTHER | Age: 89
End: 2024-05-31
Payer: MEDICARE

## 2024-06-06 RX ORDER — LISINOPRIL 10 MG/1
10 TABLET ORAL DAILY
Qty: 90 TABLET | Refills: 3 | Status: SHIPPED | OUTPATIENT
Start: 2024-06-06 | End: 2025-06-06

## 2024-06-06 NOTE — TELEPHONE ENCOUNTER
Provider Staff:  Action required for this patient    Requires labs      Please see care gap opportunities below in Care Due Message.    Thanks!  [facility]     Appointments      Date Provider   Last Visit   4/29/2024 Jenny Arizmendi MD   Next Visit   Visit date not found Jenny Arizmendi MD     Refill Decision Note   Courtney Plummer  is requesting a refill authorization.  Brief Assessment and Rationale for Refill:  Approve     Medication Therapy Plan:         Comments:     Note composed:1:03 PM 06/06/2024

## 2024-06-06 NOTE — TELEPHONE ENCOUNTER
Care Due:                  Date            Visit Type   Department     Provider  --------------------------------------------------------------------------------                                EP -                              PRIMARY      [facility]  Last Visit: 04-      CARE ([facility])   MEDICINE       Jenny Arizmendi  Next Visit: None Scheduled  None         None Found                                                            Last  Test          Frequency    Reason                     Performed    Due Date  --------------------------------------------------------------------------------    Mg Level....  12 months..  alendronate..............  Not Found    Overdue    Phosphate...  12 months..  alendronate..............  Not Found    Overdue    Vitamin D...  12 months..  alendronate..............  Not Found    Overdue    [employer] Embedded Care Due Messages. Reference number: [id number].   6/06/2024 10:12:02 AM CDT

## 2024-06-10 ENCOUNTER — PATIENT MESSAGE (OUTPATIENT)
Dept: INTERNAL MEDICINE | Facility: CLINIC | Age: 89
End: 2024-06-10
Payer: MEDICARE

## 2024-06-10 NOTE — TELEPHONE ENCOUNTER
Spoke to Pt. Pt denies having another concussion since 10/2022, dizziness, headaches, or other concussion symptoms. Pt stated she is looking for a neurologist for her neuropathy.

## 2024-07-01 DIAGNOSIS — M81.0 POST-MENOPAUSAL OSTEOPOROSIS: ICD-10-CM

## 2024-07-01 NOTE — TELEPHONE ENCOUNTER
Refill Routing Note   Medication(s) are not appropriate for processing by [facility] for the following reason(s):        Outside of protocol    [facility] action(s):  Route             Appointments  past 12m or future 3m with PCP    Date Provider   Last Visit   4/29/2024 Jenny Arizmendi MD   Next Visit   Visit date not found Jenny Arizmendi MD   ED visits in past 90 days: 0        Note composed:11:34 AM 07/01/2024

## 2024-07-01 NOTE — TELEPHONE ENCOUNTER
No care due was identified.  [facility] Embedded Care Due Messages. Reference number: [id number].   7/01/2024 11:33:02 AM CDT

## 2024-07-03 DIAGNOSIS — M81.0 POST-MENOPAUSAL OSTEOPOROSIS: ICD-10-CM

## 2024-07-04 NOTE — TELEPHONE ENCOUNTER
No care due was identified.  [facility] Embedded Care Due Messages. Reference number: [id number].   7/03/2024 9:57:05 PM CDT

## 2024-07-05 RX ORDER — ALENDRONATE SODIUM 70 MG/1
TABLET ORAL
Qty: 12 TABLET | Refills: 0 | Status: SHIPPED | OUTPATIENT
Start: 2024-07-05

## 2024-07-19 ENCOUNTER — LAB VISIT (OUTPATIENT)
Dept: LAB | Facility: HOSPITAL | Age: 89
End: 2024-07-19
Attending: STUDENT IN AN ORGANIZED HEALTH CARE EDUCATION/TRAINING PROGRAM
Payer: MEDICARE

## 2024-07-19 DIAGNOSIS — I10 ESSENTIAL HYPERTENSION: ICD-10-CM

## 2024-07-19 DIAGNOSIS — E78.5 HYPERLIPIDEMIA, UNSPECIFIED HYPERLIPIDEMIA TYPE: ICD-10-CM

## 2024-07-19 LAB
ALBUMIN SERPL BCP-MCNC: 3.7 G/DL (ref 3.5–5.2)
ALP SERPL-CCNC: 49 U/L (ref 55–135)
ALT SERPL W/O P-5'-P-CCNC: 12 U/L (ref 10–44)
ANION GAP SERPL CALC-SCNC: 7 MMOL/L (ref 8–16)
AST SERPL-CCNC: 16 U/L (ref 10–40)
BILIRUB SERPL-MCNC: 0.7 MG/DL (ref 0.1–1)
BUN SERPL-MCNC: 12 MG/DL (ref 8–23)
CALCIUM SERPL-MCNC: 8.9 MG/DL (ref 8.7–10.5)
CHLORIDE SERPL-SCNC: 105 MMOL/L (ref 95–110)
CHOLEST SERPL-MCNC: 182 MG/DL (ref 120–199)
CHOLEST/HDLC SERPL: 2.7 {RATIO} (ref 2–5)
CO2 SERPL-SCNC: 25 MMOL/L (ref 23–29)
CREAT SERPL-MCNC: 0.7 MG/DL (ref 0.5–1.4)
EST. GFR  (NO RACE VARIABLE): >60 ML/MIN/1.73 M^2
GLUCOSE SERPL-MCNC: 99 MG/DL (ref 70–110)
HDLC SERPL-MCNC: 68 MG/DL (ref 40–75)
HDLC SERPL: 37.4 % (ref 20–50)
LDLC SERPL CALC-MCNC: 106.6 MG/DL (ref 63–159)
NONHDLC SERPL-MCNC: 114 MG/DL
POTASSIUM SERPL-SCNC: 4.8 MMOL/L (ref 3.5–5.1)
PROT SERPL-MCNC: 6.2 G/DL (ref 6–8.4)
SODIUM SERPL-SCNC: 137 MMOL/L (ref 136–145)
TRIGL SERPL-MCNC: 37 MG/DL (ref 30–150)

## 2024-07-19 PROCEDURE — 36415 COLL VENOUS BLD VENIPUNCTURE: CPT | Mod: HCNC,PN | Performed by: STUDENT IN AN ORGANIZED HEALTH CARE EDUCATION/TRAINING PROGRAM

## 2024-07-19 PROCEDURE — 80053 COMPREHEN METABOLIC PANEL: CPT | Mod: HCNC | Performed by: STUDENT IN AN ORGANIZED HEALTH CARE EDUCATION/TRAINING PROGRAM

## 2024-07-19 PROCEDURE — 80061 LIPID PANEL: CPT | Mod: HCNC | Performed by: STUDENT IN AN ORGANIZED HEALTH CARE EDUCATION/TRAINING PROGRAM

## 2024-08-01 ENCOUNTER — PATIENT MESSAGE (OUTPATIENT)
Dept: CARDIOLOGY | Facility: CLINIC | Age: 89
End: 2024-08-01
Payer: MEDICARE

## 2024-08-26 NOTE — PROGRESS NOTES
"  Courtney Plummer presented for a  Medicare AWV and comprehensive Health Risk Assessment today. The following components were reviewed and updated:    Medical history  Family History  Social history  Allergies and Current Medications  Health Risk Assessment  Health Maintenance  Care Team         ** See Completed Assessments for Annual Wellness Visit within the encounter summary.**         The following assessments were completed:  Living Situation  CAGE  Depression Screening  Timed Get Up and Go  Whisper Test  Cognitive Function Screening      Nutrition Screening  ADL Screening  PAQ Screening        Vitals:    03/27/23 1022   BP: (!) 126/58   BP Location: Right arm   Patient Position: Sitting   BP Method: Medium (Manual)   Pulse: 77   SpO2: 98%   Weight: 58 kg (127 lb 13.9 oz)   Height: 5' 1" (1.549 m)     Body mass index is 24.16 kg/m².  Physical Exam  Vitals and nursing note reviewed.   Constitutional:       Appearance: Normal appearance. She is normal weight.   HENT:      Head: Normocephalic.      Nose: Nose normal.      Mouth/Throat:      Mouth: Mucous membranes are moist.   Eyes:      Conjunctiva/sclera: Conjunctivae normal.   Cardiovascular:      Rate and Rhythm: Normal rate and regular rhythm.      Heart sounds: Normal heart sounds.   Pulmonary:      Effort: Pulmonary effort is normal.      Breath sounds: Normal breath sounds.   Musculoskeletal:         General: Normal range of motion.      Cervical back: Normal range of motion and neck supple.   Skin:     General: Skin is warm and dry.   Neurological:      General: No focal deficit present.      Mental Status: She is alert and oriented to person, place, and time.   Psychiatric:         Mood and Affect: Mood normal.         Behavior: Behavior normal.         Thought Content: Thought content normal.         Judgment: Judgment normal.     Diagnoses and health risks identified today and associated recommendations/orders:    1. 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  No    Surekha MENDEZ is supervising clinician today.    ECOG:   ECOG [08/26/24 1429]   ECOG Performance Status 0       Review and verified Advanced Directives: Yes: Advance Directive is in chart     Verified if patient has state DNR bracelet on: No; Full Code    Nursing Assessment:   A focused nursing assessment addressing the toxicity of chemotherapy was performed and the patient reports the following:    Anxiety/Depression/Insomnia: Anxiety: No, Depression: No, and Insomnia: No  Pain: NO    Toxicity Assessment    Adverse Events?  Adverse Events: No    Auditory/Ear  Assessment: Yes (Within Defined Limits)    Cardiac General  Assessment: Yes (Within Defined Limits)    Constitutional  Assessment: Yes (w/ Exceptions to WDL)  Fatigue: Grade 1    Dermatology/Skin  Assessment: Yes (Within Defined Limits)    Endocrine  Assessment: Yes (Within Defined Limits)    Gastrointestinal  Assessment: Yes (Within Defined Limits)    Hemorrhage/Bleeding  Assessment: Yes (Within Defined Limits)    Infection  Assessment: Yes (Within Defined Limits)    Lymphatics  Assessment: Yes (Within Defined Limits)    Musculoskeletal  Assessment: Yes (Within Defined Limits)    Neurology  Assessment: Yes (Within Defined Limits)    Ocular  Assessment: Yes (Within Defined Limits)    Pain  Assessment: Yes (Within Defined Limits)    Pulmonary/Upper Respiratory  Assessment: Yes (Within Defined Limits)    Genitourinary  Assessment: Yes (Within Defined Limits)         Pre-Treatment: Treatment consent signed  Patient has valid pre-authorization  VS completed  Height and weight verified  BSA independently double checked & verified by two practitioners  Premed orders, including hydration, are verified prior to  administration  Treatment parameters verified in patient protocol  Lab results reviewed: Treatment conditions met, ok to proceed with treatment   Chemotherapy doses independently doubled checked & verified by two practitioners    Treatment: I have reviewed the following with the patient:  Name of chemo drug, duration and route of infusion, and reportable infusion-related symptoms.  Chemotherapy has not ; double checked & verified by two practitioners  Appearance and physical integrity of drugs meets standard of drug monograph; double checked & verified by two practitioners  Rate set on infusion pump is in alignment with ordered rate; double checked & verified by two practitioners  Blood return confirmed before, during and after treatment administered  Infusion pump used for non-vesicant drugs  Drugs were administered in proper sequencing  Refer to LDA and MAR for line assessment and medication administration  Extravasation/Infiltration: No    Post Treatment: Treatment tolerated well; no adverse reaction    Transfusion: Not needed    Integrative Medicine: No    Oral Chemotherapy: Yes, Patient is taking medication as prescribed.    Education: No new instructions needed    Next appointment scheduled: 24 for RN check and treatment.  Patient instructed to call the office with any questions or concerns.    Patient Discharged: patient discharged to home per self, ambulatory     wellness exam  - Ambulatory Referral/Consult to Enhanced Annual Wellness Visit (eAWV)    2. Encounter for preventive health examination  Exam done    Health Maintenance updated    Records reviewed    3. Atherosclerosis of aorta  Chronic, followed by PCP    4. Carotid artery disease, unspecified laterality, unspecified type  Chronic, followed by PCP    5. Senile purpura  Chronic, followed by PCP    6. Idiopathic peripheral neuropathy, since 2014, back surgery  Chronic, followed by PCP    7. Need for shingles vaccine  - (In Office Administered) Zoster Recombinant Vaccine    8. BMI 24.0-24.9, adult  BMI reviewed      Provided Courtney with a 5-10 year written screening schedule and personal prevention plan. Recommendations were developed using the USPSTF age appropriate recommendations. Education, counseling, and referrals were provided as needed. After Visit Summary printed and given to patient which includes a list of additional screenings\tests needed.    Follow up for next available with PCP Dr. Arizmendi as scheduled.    Clara Rosario, HOMERO      I offered to discuss advanced care planning, including how to pick a person who would make decisions for you if you were unable to make them for yourself, called a health care power of , and what kind of decisions you might make such as use of life sustaining treatments such as ventilators and tube feeding when faced with a life limiting illness recorded on a living will that they will need to know. (How you want to be cared for as you near the end of your natural life)     X Patient is interested in learning more about how to make advanced directives.  I provided them paperwork and offered to discuss this with them.

## 2024-08-31 ENCOUNTER — OFFICE VISIT (OUTPATIENT)
Dept: URGENT CARE | Facility: CLINIC | Age: 89
End: 2024-08-31
Payer: MEDICARE

## 2024-08-31 VITALS
HEART RATE: 75 BPM | HEIGHT: 61 IN | TEMPERATURE: 98 F | WEIGHT: 121 LBS | SYSTOLIC BLOOD PRESSURE: 134 MMHG | DIASTOLIC BLOOD PRESSURE: 81 MMHG | RESPIRATION RATE: 18 BRPM | BODY MASS INDEX: 22.84 KG/M2 | OXYGEN SATURATION: 95 %

## 2024-08-31 DIAGNOSIS — R35.0 FREQUENCY OF URINATION: ICD-10-CM

## 2024-08-31 DIAGNOSIS — N30.01 ACUTE CYSTITIS WITH HEMATURIA: Primary | ICD-10-CM

## 2024-08-31 DIAGNOSIS — R80.9 PROTEINURIA, UNSPECIFIED TYPE: ICD-10-CM

## 2024-08-31 LAB
BILIRUBIN, UA POC OHS: NEGATIVE
BLOOD, UA POC OHS: ABNORMAL
CLARITY, UA POC OHS: ABNORMAL
COLOR, UA POC OHS: YELLOW
GLUCOSE, UA POC OHS: NEGATIVE
KETONES, UA POC OHS: NEGATIVE
LEUKOCYTES, UA POC OHS: ABNORMAL
NITRITE, UA POC OHS: NEGATIVE
PH, UA POC OHS: 7
PROTEIN, UA POC OHS: >=300
SPECIFIC GRAVITY, UA POC OHS: 1.01
UROBILINOGEN, UA POC OHS: 1

## 2024-08-31 PROCEDURE — 87086 URINE CULTURE/COLONY COUNT: CPT | Mod: HCNC

## 2024-08-31 RX ORDER — CIPROFLOXACIN 500 MG/1
500 TABLET ORAL 2 TIMES DAILY
Qty: 10 TABLET | Refills: 0 | Status: SHIPPED | OUTPATIENT
Start: 2024-08-31 | End: 2024-09-05

## 2024-08-31 RX ORDER — LIDOCAINE HYDROCHLORIDE 10 MG/ML
2.1 INJECTION, SOLUTION INFILTRATION; PERINEURAL
Status: COMPLETED | OUTPATIENT
Start: 2024-08-31 | End: 2024-08-31

## 2024-08-31 RX ORDER — CEFTRIAXONE 1 G/1
1 INJECTION, POWDER, FOR SOLUTION INTRAMUSCULAR; INTRAVENOUS
Status: COMPLETED | OUTPATIENT
Start: 2024-08-31 | End: 2024-08-31

## 2024-08-31 RX ADMIN — LIDOCAINE HYDROCHLORIDE 2.1 ML: 10 INJECTION, SOLUTION INFILTRATION; PERINEURAL at 12:08

## 2024-08-31 RX ADMIN — CEFTRIAXONE 1 G: 1 INJECTION, POWDER, FOR SOLUTION INTRAMUSCULAR; INTRAVENOUS at 12:08

## 2024-08-31 NOTE — PROGRESS NOTES
"Subjective:      Patient ID: Courtney Plummer is a 89 y.o. female.    Vitals:  height is 5' 1" (1.549 m) and weight is 54.9 kg (121 lb). Her temperature is 98.4 °F (36.9 °C). Her blood pressure is 134/81 and her pulse is 75. Her respiration is 18 and oxygen saturation is 95%.     Chief Complaint: Dysuria    89-year-old female patient presents with dysuria, frequency, urgency, back pain started today.  History of reoccurring UTI.  She denies fever, flank pain, abdominal pain, any other associated symptoms.  No treatments done at home.        Dysuria   This is a new problem. The current episode started yesterday. The problem occurs every urination. The quality of the pain is described as aching. There has been no fever. She is Not sexually active. There is No history of pyelonephritis. Associated symptoms include frequency and urgency. Pertinent negatives include no behavior changes, chills, discharge, flank pain, hematuria, hesitancy, nausea, possible pregnancy, sweats, vomiting, weight loss, bubble bath use, constipation, rash or withholding. She has tried nothing for the symptoms. Her past medical history is significant for hypertension. There is no history of catheterization, diabetes insipidus, diabetes mellitus, genitourinary reflux, kidney stones, recurrent UTIs, a single kidney, STD, urinary stasis or a urological procedure.       Constitution: Negative for activity change, appetite change, chills, sweating and fatigue.   HENT:  Negative for ear pain, ear discharge, foreign body in ear, tinnitus, hearing loss, sore throat, trouble swallowing and voice change.    Neck: Negative for neck pain, neck stiffness and painful lymph nodes.   Cardiovascular:  Negative for chest trauma, chest pain, leg swelling and palpitations.   Eyes:  Negative for eye trauma, foreign body in eye, eye discharge, eye itching and eye pain.   Respiratory:  Negative for sleep apnea, chest tightness, cough, sputum production, bloody sputum " and asthma.    Gastrointestinal:  Negative for abdominal trauma, abdominal pain, abdominal bloating, history of abdominal surgery, nausea, vomiting and constipation.   Endocrine: hair loss, cold intolerance and heat intolerance.   Genitourinary:  Positive for dysuria, frequency and urgency. Negative for urine decreased, flank pain, bladder incontinence, bed wetting, hematuria, painful intercourse and pelvic pain.   Musculoskeletal:  Positive for back pain. Negative for pain, trauma, joint pain, joint swelling and abnormal ROM of joint.   Skin:  Negative for color change, pale, rash and wound.   Allergic/Immunologic: Negative for environmental allergies, seasonal allergies, food allergies, eczema and asthma.   Neurological:  Negative for dizziness, history of vertigo, light-headedness, passing out, facial drooping, disorientation and altered mental status.   Hematologic/Lymphatic: Negative for swollen lymph nodes, easy bruising/bleeding, trouble clotting and history of blood clots. Does not bruise/bleed easily.   Psychiatric/Behavioral:  Negative for altered mental status, disorientation, confusion, agitation and nervous/anxious. The patient is not nervous/anxious.       Objective:     Physical Exam   Constitutional: She is oriented to person, place, and time. She appears well-developed. She is cooperative. No distress.   HENT:   Head: Normocephalic and atraumatic.   Nose: Nose normal.   Mouth/Throat: Oropharynx is clear and moist and mucous membranes are normal.   Eyes: Conjunctivae and lids are normal.   Neck: Trachea normal and phonation normal. Neck supple.   Cardiovascular: Normal rate, regular rhythm, normal heart sounds and normal pulses.   Pulmonary/Chest: Effort normal and breath sounds normal.   Abdominal: Normal appearance and bowel sounds are normal. She exhibits no distension and no mass. Soft. There is no abdominal tenderness. There is no rebound, no guarding, no left CVA tenderness and no right CVA  tenderness. No hernia.   Musculoskeletal:         General: Tenderness present. No deformity or signs of injury.      Right lower leg: No edema.      Left lower leg: No edema.   Neurological: She is alert and oriented to person, place, and time. She has normal strength and normal reflexes. No sensory deficit.   Skin: Skin is warm, dry, intact and not diaphoretic.   Psychiatric: Her speech is normal and behavior is normal. Judgment and thought content normal.   Nursing note and vitals reviewed.    Results for orders placed or performed in visit on 08/31/24   POCT Urinalysis(Instrument)   Result Value Ref Range    Color, POC UA Yellow Yellow, Straw, Colorless    Clarity, POC UA Cloudy (A) Clear    Glucose, POC UA Negative Negative    Bilirubin, POC UA Negative Negative    Ketones, POC UA Negative Negative    Spec Grav POC UA 1.015 1.005 - 1.030    Blood, POC UA Moderate (A) Negative    pH, POC UA 7.0 5.0 - 8.0    Protein, POC UA >=300 (A) Negative    Urobilinogen, POC UA 1.0 <=1.0    Nitrite, POC UA Negative Negative    WBC, POC UA Small (A) Negative        Assessment:     1. Acute cystitis with hematuria    2. Frequency of urination    3. Proteinuria, unspecified type        Plan:       Acute cystitis with hematuria  -     CULTURE, URINE  -     cefTRIAXone injection 1 g  -     LIDOcaine HCL 10 mg/ml (1%) injection 2.1 mL  -     ciprofloxacin HCl (CIPRO) 500 MG tablet; Take 1 tablet (500 mg total) by mouth 2 (two) times daily. for 5 days  Dispense: 10 tablet; Refill: 0    Frequency of urination  -     POCT Urinalysis(Instrument)    Proteinuria, unspecified type          Medical Decision Making:   Urgent Care Management:  >65 ys  +protein, +blood, urine sent for culture. Will tx for complicated UTI. Rocephin IM given and Cipro x 5 days.

## 2024-08-31 NOTE — PATIENT INSTRUCTIONS
Cipro twice daily for 5 days.  We will call you in a few days regarding your urine culture.  Increase fluids.    When do I need to call the doctor?   You have very bad pain in your back, shoulder, or belly.  You have a fever of 100.4°F (38°C) or higher; shaking chills or sweats even though you are taking antibiotics.  You notice more blood in your urine.  Your signs get worse or do not improve within 24 hours of starting treatment.  You are not able to urinate for more than 8 hours.  Your signs come back after treatment has stopped.  - Rest.    - Drink plenty of fluids.    - Acetaminophen (tylenol) or Ibuprofen (advil,motrin) as directed as needed for fever/pain. Avoid tylenol if you have a history of liver disease. Do not take ibuprofen if you have a history of GI bleeding, kidney disease, or if you take blood thinners.     - Follow up with your PCP or specialty clinic as directed in the next 1-2 weeks if not improved or as needed.  You can call [phone] to schedule an appointment with the appropriate provider.    - Go to the ER or seek medical attention immediately if you develop new or worsening symptoms.     - You must understand that you have received an Urgent Care treatment only and that you may be released before all of your medical problems are known or treated.   - You, the patient, will arrange for follow up care as instructed.   - If your condition worsens or fails to improve we recommend that you receive another evaluation at the ER immediately or contact your PCP to discuss your concerns or return here.

## 2024-09-02 ENCOUNTER — PATIENT MESSAGE (OUTPATIENT)
Dept: URGENT CARE | Facility: CLINIC | Age: 89
End: 2024-09-02
Payer: MEDICARE

## 2024-09-02 LAB — BACTERIA UR CULT: NORMAL

## 2024-10-01 DIAGNOSIS — M81.0 POST-MENOPAUSAL OSTEOPOROSIS: ICD-10-CM

## 2024-10-01 NOTE — TELEPHONE ENCOUNTER
Care Due:                  Date            Visit Type   Department     Provider  --------------------------------------------------------------------------------                                EP -                              PRIMARY      [facility]  Last Visit: 04-      CARE ([facility])   MEDICINE       Jenny Arizmendi  Next Visit: None Scheduled  None         None Found                                                            Last  Test          Frequency    Reason                     Performed    Due Date  --------------------------------------------------------------------------------    Mg Level....  12 months..  alendronate..............  Not Found    Overdue    Phosphate...  12 months..  alendronate..............  Not Found    Overdue    [employer] Embedded Care Due Messages. Reference number: [id number].   10/01/2024 5:13:03 PM CDT

## 2024-10-02 RX ORDER — ALENDRONATE SODIUM 70 MG/1
TABLET ORAL
Qty: 12 TABLET | Refills: 0 | Status: SHIPPED | OUTPATIENT
Start: 2024-10-02

## 2024-10-02 NOTE — TELEPHONE ENCOUNTER
Refill Routing Note   Medication(s) are not appropriate for processing by [facility] for the following reason(s):        Outside of protocol: non-delegated    [facility] action(s):  Route      Medication Therapy Plan:         Appointments  past 12m or future 3m with PCP    Date Provider   Last Visit   4/29/2024 Jenny Arizmendi MD   Next Visit   Visit date not found Jenny Arizmendi MD   ED visits in past 90 days: 0        Note composed:8:28 AM 10/02/2024

## 2024-10-07 NOTE — TELEPHONE ENCOUNTER
Documentation Only:    Prior Authorization for [employer] has been approved for two years.    Approval dates:  03/19/2019 through 03/19/2021    Patient co-pay: $99.00    Researching possible co-pay assistance as next fill will place patient in the coverage gap.   Per KOURTNEY hastings to refill.

## 2024-11-12 ENCOUNTER — TELEPHONE (OUTPATIENT)
Dept: PRIMARY CARE CLINIC | Facility: CLINIC | Age: 89
End: 2024-11-12
Payer: MEDICARE

## 2024-11-12 NOTE — TELEPHONE ENCOUNTER
----- Message from Rosemary sent at 11/12/2024 10:07 AM CST -----  Contact: [phone]  Patient would like to gaata an ECA visit, no available appts. Please call and advise.    Thank you and have a great day.

## 2024-11-12 NOTE — TELEPHONE ENCOUNTER
Spoke with pt. Pt called to schedule [facility] annual appt with Dr. Mitchell. Pt scheduled for 12/5/24 at 8:40 am. Pt verbalized understanding.

## 2024-12-04 NOTE — PROGRESS NOTES
"  [facility] Progress Note  12/5/2024          Reason for Visit:      had concerns including Annual Exam and Leg Pain (Pulled right leg muscle, 3/10 pain, two days ago).       History of Present Illness:     Patient presents today to [facility].  She lives across the street at [facility] and this would be closer for her.    Neck Pain  She reports experiencing all over body ache, with previous labs being inconclusive. She prefers to avoid medication for this condition but is open to supplements. She also reports neck pain, primarily localized to the back of the neck, possibly related to frequent computer and iPad use. The pain worsens when using her iPad, and she has attempted to mitigate this by using a stand to improve positioning. She has a history of five back surgeries, with hardware including "nuts and bolts" in her back. Despite multiple surgeries, she expresses gratitude for her ability to stand and walk. She walks for 30 minutes daily and is interested in physical therapy, wondering if specific exercises might help with her back issues.    HTN  She reports taking half of a 10mg Lisinopril tablet daily for well-controlled blood pressure. She denies needing a refill at this time. She has had daily leg swelling for a few months, noting significant sock indentations around her legs each day.    Neuropathy  She experiences neuropathy that wakes her up at night, worsening in cold weather. She has difficulty with balance, particularly on uneven surfaces, but denies any falls. She can walk without issues on flat surfaces but experiences instability on grass or bumpy streets.    Hx of Graves disease  She has a history of Graves' disease but is not currently on thyroid medication, with recent thyroid levels reported as normal.     Osteoprosis  She takes alendronate for osteoporosis but is uncertain about the duration of treatment (appears it was started 4/2021).  Reviewed Dexa from " 2023.    Anxiety  She reports ongoing anxiety but manages it without medication, expressing a desire to avoid pharmacological treatment for her anxiety symptoms.         Problem List:     Patient Active Problem List   Diagnosis    BRIAN generalized anxiety disorder    Benign positional vertigo    Carotid bruit    Chronic low back pain    Lumbar spinal stenosis    Panic attacks    Asymptomatic gallstones    Cyst of meniscus of left knee, 04-.    History of back surgery, 09- Dr. 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 2021    At risk for falling    Frequent falls    Senile purpura    Compressed spine fracture    Major depressive disorder, single episode, in partial remission         Medications:       Current Outpatient Medications:     biotin 5,000 mcg Subl, Take 10,000 mcg by mouth once daily. 10,000mcg, Disp: , Rfl:     COLLAGEN MISC, Take 20 g by mouth once daily., Disp: , Rfl:     cyanocobalamin (VITAMIN B-12) 1000 MCG tablet, Take 2,500 mcg by mouth once daily., Disp: , Rfl:     lisinopriL 10 MG tablet, TAKE 1 TABLET (10 MG TOTAL) BY MOUTH ONCE DAILY. (Patient taking differently: Take 5 mg by mouth once daily.), Disp: 90 tablet, Rfl: 3    mv-min/iron/folic/calcium/vitK (WOMEN'S MULTIVITAMIN ORAL), Take by mouth once a week. 3 days a week, Disp: , Rfl:     VITAMIN D3 1,000 unit capsule, Take by mouth once daily. 50 mcg, Disp: , Rfl:     vitamin E,  dl,tocopheryl acet, (VITAMIN E, DL, ACETATE,) 180 mg (400 unit) Cap, , Disp: , Rfl:   No current facility-administered medications for this visit.        Review of Systems:     Review of Systems   Constitutional:  Negative for chills and fever.   HENT:  Negative for ear pain and sore throat.    Respiratory:  Negative for cough, shortness of breath and wheezing.    Cardiovascular:  Negative for chest pain and palpitations.   Gastrointestinal:  Negative for constipation, diarrhea, nausea and vomiting.   Genitourinary:  Negative for dysuria and hematuria.   Musculoskeletal:  Negative for joint swelling and joint deformity.   Neurological:  Negative for dizziness and weakness.     Physical Exam:     Temp:             Blood Pressure:  (!) 120/56        Pulse:   98     Respirations:     Weight:   54.9 kg (121 lb 0.5 oz)  Height:   5' (1.524 m)  BMI:     Body mass index is 23.64 kg/m².    Physical Exam  Constitutional:       General: She is not in acute distress.  HENT:      Right Ear: Tympanic membrane normal.      Left Ear: Tympanic membrane normal.      Nose: No congestion or rhinorrhea.      Mouth/Throat:      Pharynx: No oropharyngeal exudate or posterior oropharyngeal erythema.   Cardiovascular:      Rate and Rhythm: Normal rate and regular rhythm.   Pulmonary:      Effort: Pulmonary effort is normal. No respiratory distress.      Breath sounds: No wheezing.   Abdominal:      Palpations: Abdomen is soft.      Tenderness: There is no abdominal tenderness.   Skin:     General: Skin is warm.   Neurological:      General: No focal deficit present.      Mental Status: She is alert and oriented to person, place, and time.         Labs/Imaging/Testing:     Most recent CBC:  Lab Results   Component Value Date    WBC 5.19 04/29/2024    HGB 11.6 (L) 04/29/2024     04/29/2024    MCV 89 04/29/2024       Most recent Lipids:  Lab Results   Component Value Date    CHOL 182 07/19/2024    HDL 68 07/19/2024    LDLCALC 106.6  07/19/2024    TRIG 37 07/19/2024       Most recent Chemistry:  Lab Results   Component Value Date     07/19/2024    K 4.8 07/19/2024     07/19/2024    CO2 25 07/19/2024    BUN 12 07/19/2024    CREATININE 0.7 07/19/2024    GLU 99 07/19/2024    CALCIUM 8.9 07/19/2024    ALT 12 07/19/2024    AST 16 07/19/2024    ALKPHOS 49 (L) 07/19/2024    PROT 6.2 07/19/2024    ALBUMIN 3.7 07/19/2024       Other tests:  Lab Results   Component Value Date    TSH 0.427 04/29/2024    FREET4 0.99 01/30/2024    INR 0.9 10/07/2016    IRON 154 11/13/2023    FERRITIN 184 03/08/2013    NAEUEESI15 1215 (H) 11/13/2023    RVUVHXXK70ZK 31 06/10/2021    SEDRATE 27 (H) 10/07/2016    LIPASE 20 10/08/2017       Assessment and Plan:     1. Neck pain  - Ambulatory referral/consult to Physical/Occupational Therapy; Future    2. Graves disease  Not on meds, recheck thyroid functions  - TSH; Future  - T4, Free; Future    3. Dyslipidemia  Recheck labs  - Lipid Panel; Future    4. Essential hypertension  Continue lisinopril  - CBC Auto Differential; Future      - CBC Auto Differential; Future  - Comprehensive Metabolic Panel; Future  - Lipid Panel; Future  - Hemoglobin A1C; Future  - TSH; Future  - T4, Free; Future    RTC 6 mos or sooner flip Mitchell MD  12/5/2024    This note was prepared using voice-recognition software.  Although efforts are made to proofread the note, some errors may persist in the final document.

## 2024-12-05 ENCOUNTER — OFFICE VISIT (OUTPATIENT)
Dept: PRIMARY CARE CLINIC | Facility: CLINIC | Age: 89
End: 2024-12-05
Payer: MEDICARE

## 2024-12-05 VITALS
OXYGEN SATURATION: 97 % | HEART RATE: 98 BPM | SYSTOLIC BLOOD PRESSURE: 120 MMHG | BODY MASS INDEX: 23.77 KG/M2 | WEIGHT: 121.06 LBS | HEIGHT: 60 IN | DIASTOLIC BLOOD PRESSURE: 56 MMHG

## 2024-12-05 DIAGNOSIS — Z00.00 WELL ADULT EXAM: ICD-10-CM

## 2024-12-05 DIAGNOSIS — Z23 NEED FOR VACCINATION: ICD-10-CM

## 2024-12-05 DIAGNOSIS — I10 ESSENTIAL HYPERTENSION: ICD-10-CM

## 2024-12-05 DIAGNOSIS — R73.01 IFG (IMPAIRED FASTING GLUCOSE): ICD-10-CM

## 2024-12-05 DIAGNOSIS — M54.2 NECK PAIN: Primary | ICD-10-CM

## 2024-12-05 DIAGNOSIS — E78.5 DYSLIPIDEMIA: ICD-10-CM

## 2024-12-05 DIAGNOSIS — E05.00 GRAVES DISEASE: ICD-10-CM

## 2024-12-05 PROCEDURE — 99999 PR PBB SHADOW E&M-EST. PATIENT-LVL IV: CPT | Mod: PBBFAC,HCNC,, | Performed by: INTERNAL MEDICINE

## 2024-12-10 ENCOUNTER — LAB VISIT (OUTPATIENT)
Dept: LAB | Facility: HOSPITAL | Age: 89
End: 2024-12-10
Attending: INTERNAL MEDICINE
Payer: MEDICARE

## 2024-12-10 DIAGNOSIS — R73.01 IFG (IMPAIRED FASTING GLUCOSE): ICD-10-CM

## 2024-12-10 DIAGNOSIS — I10 ESSENTIAL HYPERTENSION: ICD-10-CM

## 2024-12-10 DIAGNOSIS — Z00.00 WELL ADULT EXAM: ICD-10-CM

## 2024-12-10 DIAGNOSIS — E05.00 GRAVES DISEASE: ICD-10-CM

## 2024-12-10 DIAGNOSIS — E78.5 DYSLIPIDEMIA: ICD-10-CM

## 2024-12-10 LAB
ALBUMIN SERPL BCP-MCNC: 3.8 G/DL (ref 3.5–5.2)
ALP SERPL-CCNC: 47 U/L (ref 40–150)
ALT SERPL W/O P-5'-P-CCNC: 13 U/L (ref 10–44)
ANION GAP SERPL CALC-SCNC: 8 MMOL/L (ref 8–16)
AST SERPL-CCNC: 18 U/L (ref 10–40)
BASOPHILS # BLD AUTO: 0.04 K/UL (ref 0–0.2)
BASOPHILS NFR BLD: 0.9 % (ref 0–1.9)
BILIRUB SERPL-MCNC: 0.7 MG/DL (ref 0.1–1)
BUN SERPL-MCNC: 15 MG/DL (ref 8–23)
CALCIUM SERPL-MCNC: 8.7 MG/DL (ref 8.7–10.5)
CHLORIDE SERPL-SCNC: 108 MMOL/L (ref 95–110)
CHOLEST SERPL-MCNC: 176 MG/DL (ref 120–199)
CHOLEST/HDLC SERPL: 2.7 {RATIO} (ref 2–5)
CO2 SERPL-SCNC: 23 MMOL/L (ref 23–29)
CREAT SERPL-MCNC: 0.7 MG/DL (ref 0.5–1.4)
DIFFERENTIAL METHOD BLD: ABNORMAL
EOSINOPHIL # BLD AUTO: 0.2 K/UL (ref 0–0.5)
EOSINOPHIL NFR BLD: 4.5 % (ref 0–8)
ERYTHROCYTE [DISTWIDTH] IN BLOOD BY AUTOMATED COUNT: 17.8 % (ref 11.5–14.5)
EST. GFR  (NO RACE VARIABLE): >60 ML/MIN/1.73 M^2
ESTIMATED AVG GLUCOSE: 97 MG/DL (ref 68–131)
GLUCOSE SERPL-MCNC: 91 MG/DL (ref 70–110)
HBA1C MFR BLD: 5 % (ref 4–5.6)
HCT VFR BLD AUTO: 34.1 % (ref 37–48.5)
HDLC SERPL-MCNC: 66 MG/DL (ref 40–75)
HDLC SERPL: 37.5 % (ref 20–50)
HGB BLD-MCNC: 11 G/DL (ref 12–16)
IMM GRANULOCYTES # BLD AUTO: 0.02 K/UL (ref 0–0.04)
IMM GRANULOCYTES NFR BLD AUTO: 0.4 % (ref 0–0.5)
LDLC SERPL CALC-MCNC: 99.4 MG/DL (ref 63–159)
LYMPHOCYTES # BLD AUTO: 1.4 K/UL (ref 1–4.8)
LYMPHOCYTES NFR BLD: 31 % (ref 18–48)
MCH RBC QN AUTO: 29.3 PG (ref 27–31)
MCHC RBC AUTO-ENTMCNC: 32.3 G/DL (ref 32–36)
MCV RBC AUTO: 91 FL (ref 82–98)
MONOCYTES # BLD AUTO: 0.7 K/UL (ref 0.3–1)
MONOCYTES NFR BLD: 15.2 % (ref 4–15)
NEUTROPHILS # BLD AUTO: 2.2 K/UL (ref 1.8–7.7)
NEUTROPHILS NFR BLD: 48 % (ref 38–73)
NONHDLC SERPL-MCNC: 110 MG/DL
NRBC BLD-RTO: 0 /100 WBC
PLATELET # BLD AUTO: 166 K/UL (ref 150–450)
PMV BLD AUTO: 12 FL (ref 9.2–12.9)
POTASSIUM SERPL-SCNC: 4.7 MMOL/L (ref 3.5–5.1)
PROT SERPL-MCNC: 6.2 G/DL (ref 6–8.4)
RBC # BLD AUTO: 3.76 M/UL (ref 4–5.4)
SODIUM SERPL-SCNC: 139 MMOL/L (ref 136–145)
T4 FREE SERPL-MCNC: 0.99 NG/DL (ref 0.71–1.51)
TRIGL SERPL-MCNC: 53 MG/DL (ref 30–150)
TSH SERPL DL<=0.005 MIU/L-ACNC: 0.62 UIU/ML (ref 0.4–4)
WBC # BLD AUTO: 4.62 K/UL (ref 3.9–12.7)

## 2024-12-10 PROCEDURE — 36415 COLL VENOUS BLD VENIPUNCTURE: CPT | Mod: HCNC,PN | Performed by: INTERNAL MEDICINE

## 2024-12-10 PROCEDURE — 84439 ASSAY OF FREE THYROXINE: CPT | Mod: HCNC | Performed by: INTERNAL MEDICINE

## 2024-12-10 PROCEDURE — 85025 COMPLETE CBC W/AUTO DIFF WBC: CPT | Mod: HCNC | Performed by: INTERNAL MEDICINE

## 2024-12-10 PROCEDURE — 83036 HEMOGLOBIN GLYCOSYLATED A1C: CPT | Mod: HCNC | Performed by: INTERNAL MEDICINE

## 2024-12-10 PROCEDURE — 80061 LIPID PANEL: CPT | Mod: HCNC | Performed by: INTERNAL MEDICINE

## 2024-12-10 PROCEDURE — 84443 ASSAY THYROID STIM HORMONE: CPT | Mod: HCNC | Performed by: INTERNAL MEDICINE

## 2024-12-10 PROCEDURE — 80053 COMPREHEN METABOLIC PANEL: CPT | Mod: HCNC | Performed by: INTERNAL MEDICINE

## 2024-12-17 ENCOUNTER — PATIENT MESSAGE (OUTPATIENT)
Dept: ADMINISTRATIVE | Facility: OTHER | Age: 89
End: 2024-12-17
Payer: MEDICARE

## 2024-12-20 ENCOUNTER — OFFICE VISIT (OUTPATIENT)
Dept: INTERNAL MEDICINE | Facility: CLINIC | Age: 89
End: 2024-12-20
Payer: MEDICARE

## 2024-12-20 VITALS
WEIGHT: 121 LBS | DIASTOLIC BLOOD PRESSURE: 62 MMHG | BODY MASS INDEX: 23.75 KG/M2 | RESPIRATION RATE: 16 BRPM | TEMPERATURE: 98 F | HEART RATE: 83 BPM | SYSTOLIC BLOOD PRESSURE: 112 MMHG | HEIGHT: 60 IN | OXYGEN SATURATION: 100 %

## 2024-12-20 DIAGNOSIS — G60.9 IDIOPATHIC PERIPHERAL NEUROPATHY: ICD-10-CM

## 2024-12-20 DIAGNOSIS — D69.2 SENILE PURPURA: ICD-10-CM

## 2024-12-20 DIAGNOSIS — G89.29 CHRONIC LOW BACK PAIN, UNSPECIFIED BACK PAIN LATERALITY, UNSPECIFIED WHETHER SCIATICA PRESENT: ICD-10-CM

## 2024-12-20 DIAGNOSIS — I10 ESSENTIAL HYPERTENSION: ICD-10-CM

## 2024-12-20 DIAGNOSIS — F32.4 MAJOR DEPRESSIVE DISORDER, SINGLE EPISODE, IN PARTIAL REMISSION: ICD-10-CM

## 2024-12-20 DIAGNOSIS — F41.1 GENERALIZED ANXIETY DISORDER: ICD-10-CM

## 2024-12-20 DIAGNOSIS — I70.0 ATHEROSCLEROSIS OF AORTA: ICD-10-CM

## 2024-12-20 DIAGNOSIS — E05.00 GRAVES DISEASE: ICD-10-CM

## 2024-12-20 DIAGNOSIS — Z00.00 ENCOUNTER FOR PREVENTIVE HEALTH EXAMINATION: Primary | ICD-10-CM

## 2024-12-20 DIAGNOSIS — I77.9 BILATERAL CAROTID ARTERY DISEASE, UNSPECIFIED TYPE: ICD-10-CM

## 2024-12-20 DIAGNOSIS — M54.50 CHRONIC LOW BACK PAIN, UNSPECIFIED BACK PAIN LATERALITY, UNSPECIFIED WHETHER SCIATICA PRESENT: ICD-10-CM

## 2024-12-20 PROCEDURE — 99999 PR PBB SHADOW E&M-EST. PATIENT-LVL IV: CPT | Mod: PBBFAC,HCNC,,

## 2024-12-20 NOTE — PATIENT INSTRUCTIONS
Counseling and Referral of Other Preventative  (Italic type indicates deductible and co-insurance are waived)    Patient Name: Courtney Plummer  Today's Date: 12/20/2024    Health Maintenance       Date Due Completion Date    RSV Vaccine (Age 60+ and Pregnant patients) (1 - 1-dose 75+ series) Never done ---    TETANUS VACCINE 12/03/2025 12/3/2015 (Done)    Override on 12/3/2015: Done    Lipid Panel 12/10/2029 12/10/2024        No orders of the defined types were placed in this encounter.    The following information is provided to all patients.  This information is to help you find resources for any of the problems found today that may be affecting your health:                  Living healthy guide: www.[facility].[place].gov      Understanding Diabetes: www.diabetes.org      Eating healthy: www.cdc.gov/healthyweight      [facility] home safety checklist: www.cdc.gov/steadi/patient.html      Agency on Aging: www.goea.[place].gov      Alcoholics anonymous (AA): www.aa.org      Physical Activity: www.kendrick.nih.gov/fv0fsyp      Tobacco use: www.quitwithusla.org

## 2024-12-20 NOTE — PROGRESS NOTES
Courtney Plummer presented for a follow-up Medicare AWV today. She is a patient of Dr. Mitchell and is new to me.  The following components were reviewed and updated:    Medical history  Family History  Social history  Allergies and Current Medications  Health Risk Assessment  Health Maintenance  Care Team    **See Completed Assessments for Annual Wellness visit with in the encounter summary    The following assessments were completed:  Depression Screening  Cognitive function Screening    Timed Get Up Test  Whisper Test      Opioid documentation:      Patient does not have a current opioid prescription.          Vitals:    12/20/24 0944   BP: 112/62   BP Location: Right arm   Patient Position: Sitting   Pulse: 83   Resp: 16   Temp: 97.9 °F (36.6 °C)   TempSrc: Temporal   SpO2: 100%   Weight: 54.9 kg (121 lb)   Height: 5' (1.524 m)     Body mass index is 23.63 kg/m².       Physical Exam  Vitals reviewed.   Constitutional:       Appearance: Normal appearance.   HENT:      Head: Normocephalic and atraumatic.   Cardiovascular:      Rate and Rhythm: Normal rate and regular rhythm.      Pulses: Normal pulses.           Radial pulses are 2+ on the right side and 2+ on the left side.        Dorsalis pedis pulses are 2+ on the right side and 2+ on the left side.        Posterior tibial pulses are 2+ on the right side and 2+ on the left side.      Heart sounds: Normal heart sounds.   Pulmonary:      Effort: Pulmonary effort is normal.      Breath sounds: Normal breath sounds.   Musculoskeletal:      Right lower leg: No edema.      Left lower leg: No edema.   Skin:     General: Skin is warm and dry.      Capillary Refill: Capillary refill takes less than 2 seconds.   Neurological:      General: No focal deficit present.      Mental Status: She is alert and oriented to person, place, and time.   Psychiatric:         Mood and Affect: Mood normal.         Behavior: Behavior normal.       Diagnoses and health risks identified  today and associated recommendations/orders:  1. Encounter for preventive health examination  Assessment and evaluation performed as stated above    2. Chronic low back pain, unspecified back pain laterality, unspecified whether sciatica present  Chronic, stable periods patient exercises 6 days a week.  Follow-up with PCP and outside neurosurgery    3. Idiopathic peripheral neuropathy, since 2014, back surgery  Chronic, stable.  Follow-up with PCP and outside Neurosurgery    4. Bilateral carotid artery disease, unspecified type  Stable per ultrasound performed on 05/01/2024.The right distal internal carotid artery has 40-49% stenosis. There is mild heterogeneous plaque in the right distal internal carotid artery. The left mid internal carotid artery has 40-49% stenosis. There is mild heterogeneous plaque in the left mid internal carotid artery.  Follow-up with cardiology    5. Atherosclerosis of aorta  Stable on current regimen.  Encouraged patient to follow low-fat low-cholesterol diet.  Follow up with PCP    6. Senile purpura  Stable on current regimen.  Platelets 166 on 12/10/2024.  Patient denies spontaneous bruising; patient endorses bruising easily when knocking or hitting her limbs.  Follow-up with PCP    7. Graves disease  Stable on current regimen.  Follow up with PCP    8. Major depressive disorder, single episode, in partial remission  9. Generalized anxiety disorder  Stable on current treatment regimen.  Follow up with PCP    10. Essential hypertension  Stable on lisinopril.  Encouraged patient to reduce sodium intake.  Follow up with PCP      Provided Courtney with a 5-10 year written screening schedule and personal prevention plan. Recommendations were developed using the USPSTF age appropriate recommendations. Education, counseling, and referrals were provided as needed.  After Visit Summary printed and given to patient which includes a list of additional screenings\tests needed.    Follow up in about  1 year (around 12/20/2025), or if symptoms worsen or fail to improve.      Vicky Leonard, JENNIFER    I offered to discuss advanced care planning, including how to pick a person who would make decisions for you if you were unable to make them for yourself, called a health care power of , and what kind of decisions you might make such as use of life sustaining treatments such as ventilators and tube feeding when faced with a life limiting illness recorded on a living will that they will need to know. (How you want to be cared for as you near the end of your natural life)     X  Patient has advanced directives written and agrees to provide copies to the institution.

## 2024-12-22 NOTE — TELEPHONE ENCOUNTER
No care due was identified.  [facility] Embedded Care Due Messages. Reference number: [id number].   12/21/2024 10:27:58 PM CST

## 2024-12-23 RX ORDER — LISINOPRIL 10 MG/1
10 TABLET ORAL DAILY
Qty: 90 TABLET | Refills: 3 | Status: SHIPPED | OUTPATIENT
Start: 2024-12-23 | End: 2025-12-23

## 2024-12-23 NOTE — TELEPHONE ENCOUNTER
Refill Routing Note   Medication(s) are not appropriate for processing by [facility] for the following reason(s):        No active prescription written by provider    [facility] action(s):  Defer             Appointments  past 12m or future 3m with PCP    Date Provider   Last Visit   12/5/2024 John Mitchell MD   Next Visit   5/1/2025 John Mitchell MD   ED visits in past 90 days: 0        Note composed:9:35 AM 12/23/2024

## 2025-01-02 ENCOUNTER — CLINICAL SUPPORT (OUTPATIENT)
Dept: REHABILITATION | Facility: HOSPITAL | Age: 89
End: 2025-01-02
Attending: INTERNAL MEDICINE
Payer: MEDICARE

## 2025-01-02 DIAGNOSIS — M54.2 NECK PAIN: ICD-10-CM

## 2025-01-02 PROCEDURE — 97161 PT EVAL LOW COMPLEX 20 MIN: CPT | Mod: HCNC,PO | Performed by: PHYSICAL THERAPIST

## 2025-01-02 PROCEDURE — 97110 THERAPEUTIC EXERCISES: CPT | Mod: HCNC,PO | Performed by: PHYSICAL THERAPIST

## 2025-01-02 NOTE — PLAN OF CARE
[facility]   Physical Therapy Initial Evaluation      Name: Courtney Plummer  Clinic Number: [id number]    Therapy Diagnosis:   Encounter Diagnosis   Name Primary?    Neck pain         Physician: John Mitchell MD    Physician Orders: PT Eval and Treat   Medical Diagnosis from Referral: M54.2 (ICD-10-CM) - Neck pain  Evaluation Date: 1/2/2025  Authorization Period Expiration: 12/5/2025  Plan of Care Expiration: 4/2/2025  Progress Note Due: 2/2/2025  Date of Surgery: n/a  Visit # / Visits authorized: 1/ pending   FOTO: 1/ 3    Precautions: Fall     Time In: 905 am  Time Out: 945 am  Total Billable Time: 45 minutes    Subjective     Date of onset: few months ago    History of current condition - Courtney reports: she thinks that the pain started after increased IPAD use but after a trip over [place] and fell backward and since then she has not had any neck pain. She could do all activity but would have just pain that was present, no headaches. No N&T into the hands and does not endorse any weakness in the arms. She had pain in the neck that would radiatae into the head. She has moved her IPAD into a stand to prevent looking down.     Falls: one on xmas day    Imaging: none:     Prior Therapy: long time ago for back pain  Social History: lives in [facility] with 1 flight of steps to enter   Occupation: retired  Prior Level of Function: indep  Current Level of Function: indep    Pain:  Current 0/10, worst 6/10, best 0/10   Location: right neck radiating into the head    Description: Sharp  Aggravating Factors: nothing specific  Easing Factors: rest    Patients goals: figure out what to do if it returns      Medical History:   Past Medical History:   Diagnosis Date    Anxiety     Essential hypertension 1/8/2018    Graves disease     Hypertension     Kidney stones 12/3/2015       Surgical History:   Courtney Plummer  has a past surgical history that includes Thyroid surgery; Back surgery;  Spine surgery;  section, classic; Fracture surgery; Eye surgery (2019); and Cosmetic surgery ().    Medications:   Courtney has a current medication list which includes the following prescription(s): biotin, collagen, cyanocobalamin, lisinopril, mv-min/iron/folic/calcium/vitk, vitamin d3, and vitamin e (dl, acetate).    Allergies:   Review of patient's allergies indicates:   Allergen Reactions    Sulfa (sulfonamide antibiotics) Anaphylaxis    Macrobid [nitrofurantoin monohyd/m-cryst]      Fever, joint pain    Codeine Anxiety        Objective      Observation: amb indep into clinic    Posture: Min FHP and bilateral rounded shoulders    Cervical Range of Motion:    Degrees   Flexion 35   Extension 35   Right Rotation 60   Left Rotation 60   Right Side Bend 30   Left Side Bend 30      Shoulder Range of Motion & Strength WFL      Special Tests:  Distraction Min restricted   Compression NT   Spurlings NT     Joint Mobility: right first rib elevated, right side suboccipitals showed stiffness in nodding     Palpation: tenderness R side upper trap / levator, pectoral complex and scalenes, min tenderness in suboccipitals    Sensation: WFL    Flexibility: tightness in pectorals and right side scalenes and UT/Levator    Intake Outcome Measure for FOTO  Survey    Therapist reviewed FOTO scores for Courtney Plummer on 2025.   FOTO report - see Media section or FOTO account episode details.    Intake Score: 69 %         Treatment     Total Treatment time (time-based codes) separate from Evaluation: 15 minutes     Courtney received the treatments listed below:      therapeutic exercises to develop strength, endurance, ROM, flexibility, posture, and core stabilization for 15 minutes including:  Education (see below)  DNF  Scap pinches  R side UT stretch      Patient Education and Home Exercises     Education provided:   - educated on anatomy and arthritic changes present in the cervical spine  -home exercise  program importance and POC    Written Home Exercises Provided: Yes. Exercises were reviewed and Courtney was able to demonstrate them prior to the end of the session.  Courtney demonstrated fair  understanding of the education provided. See EMR under Patient Instructions for exercises provided during therapy sessions.    Assessment     Courtney is a 89 y.o. female referred to outpatient Physical Therapy with a medical diagnosis of M54.2 (ICD-10-CM) - Neck pain. Patient presents with right side cervical spine muscle tightness and generalized stiffness causing limitation in cervical range of motion. She is currently not experiencing any symptoms, so we will plan to focus on global cervical and upper thoracic strengthening focusing on postural stability. She also expressed concerns about balance and I will reach out to referring MD to enter referral for this so we can evaluate and treat balance as well since her cervical symptoms are minimal    Patient prognosis is Good.   Patient will benefit from skilled outpatient Physical Therapy to address the deficits stated above and in the chart below, provide patient /family education, and to maximize patientt's level of independence.     Plan of care discussed with patient: Yes  Patient's spiritual, cultural and educational needs considered and patient is agreeable to the plan of care and goals as stated below:     Anticipated Barriers for therapy: age    Medical Necessity is demonstrated by the following  History  Co-morbidities and personal factors that may impact the plan of care [x] LOW: no personal factors / co-morbidities  [] MODERATE: 1-2 personal factors / co-morbidities  [] HIGH: 3+ personal factors / co-morbidities    Moderate / High Support Documentation:   Co-morbidities affecting plan of care: see chart    Personal Factors:   age     Examination  Body Structures and Functions, activity limitations and participation restrictions that may impact the plan of care  [x] LOW: addressing 1-2 elements  [] MODERATE: 3+ elements  [] HIGH: 4+ elements (please support below)    Moderate / High Support Documentation: see chart     Clinical Presentation [x] LOW: stable  [] MODERATE: Evolving  [] HIGH: Unstable     Decision Making/ Complexity Score: low       Goals:  Short Term Goals: 4 weeks   10 degree cervical spine range of motion improvement  50% reduction in cervical muscle tightness and tenderness  Patient will be able look into blind spot without soreness  50% FOTO score improvement    Long Term Goals: 8 weeks   Formally assess balance and set goals accordingly  Plan     Plan of care Certification: 1/2/2025 to 4/2/2025.    Outpatient Physical Therapy 2 times weekly for 6 weeks to include the following interventions: Manual Therapy, Moist Heat/ Ice, Neuromuscular Re-ed, Patient Education, Self Care, Therapeutic Activities, and Therapeutic Exercise.     Deidra Mack PT, DPT        Physician's Signature: _________________________________________ Date: ________________

## 2025-01-06 DIAGNOSIS — M81.0 POST-MENOPAUSAL OSTEOPOROSIS: ICD-10-CM

## 2025-01-06 RX ORDER — ALENDRONATE SODIUM 70 MG/1
TABLET ORAL
Qty: 12 TABLET | Refills: 0 | Status: SHIPPED | OUTPATIENT
Start: 2025-01-06

## 2025-01-06 NOTE — TELEPHONE ENCOUNTER
----- Message from Priscila sent at 1/6/2025  4:08 PM CST -----  Contact: [phone]  Requesting an RX refill or new RX.    Is this a refill or new RX: refill 1    RX name and strength (copy/paste from chart):  olendronate (not showing on file)    Is this a 30 day or 90 day RX:     Pharmacy name and phone # (copy/paste from chart):  [facility] ([place]) - [address] Phone: [phone]  Fax: [phone]          The doctors have asked that we provide their patients with the following 2 reminders -- prescription refills can take up to 72 hours, and a friendly reminder that in the future you can use your [facility] account to request refills:

## 2025-01-06 NOTE — TELEPHONE ENCOUNTER
No care due was identified.  [facility] Embedded Care Due Messages. Reference number: [id number].   1/06/2025 4:20:28 PM CST

## 2025-01-16 ENCOUNTER — CLINICAL SUPPORT (OUTPATIENT)
Dept: REHABILITATION | Facility: HOSPITAL | Age: 89
End: 2025-01-16
Payer: MEDICARE

## 2025-01-16 DIAGNOSIS — M54.2 NECK PAIN: Primary | ICD-10-CM

## 2025-01-16 PROCEDURE — 97110 THERAPEUTIC EXERCISES: CPT | Mod: HCNC,PO | Performed by: PHYSICAL THERAPIST

## 2025-01-16 PROCEDURE — 97112 NEUROMUSCULAR REEDUCATION: CPT | Mod: HCNC,PO | Performed by: PHYSICAL THERAPIST

## 2025-01-16 PROCEDURE — 97140 MANUAL THERAPY 1/> REGIONS: CPT | Mod: HCNC,PO | Performed by: PHYSICAL THERAPIST

## 2025-01-16 NOTE — PROGRESS NOTES
[facility]   Physical Therapy Treatment Note      Name: Courtney Plummer  Clinic Number: [id number]    Therapy Diagnosis:   Encounter Diagnosis   Name Primary?    Neck pain Yes     Physician: John Mitchell MD    Visit Date: 1/16/2025    Physician Orders: PT Eval and Treat   Medical Diagnosis from Referral: M54.2 (ICD-10-CM) - Neck pain  Evaluation Date: 1/2/2025  Authorization Period Expiration: 12/5/2025  Plan of Care Expiration: 4/2/2025  Progress Note Due: 2/2/2025  Date of Surgery: n/a  Visit # / Visits authorized: 1/20   FOTO: 1/ 3     Precautions: Fall      Time In: 1000 am  Time Out: 1040 am  Total Billable Time: 40 minutes    PTA Visit #: 0/5       Subjective     Patient reports: the right side is feeling better but she started to have some left side neck discomfort. She has to hold her cards down when she plays bridge so that could be a factor  She was compliant with home exercise program.  Response to previous treatment: no issues  Functional change: in progress    Pain: 3/10  Location: left neck      Objective      Objective Measures updated at progress report unless specified.     Treatment     Courtney received the treatments listed below:      therapeutic exercises to develop strength, endurance, ROM, flexibility, posture, and core stabilization for 20 minutes including:  Scap pinches 20 X 3 sec hold  R side UT stretch  Supine horizontal abduction YTB 2 X 10  Seated bilateral external rotation YTB 2 X 10  Open books X 15 each side    manual therapy techniques: Joint mobilizations, Manual traction, Myofacial release, Soft tissue Mobilization, and Friction Massage were applied to the: cervical spine for 10 minutes, including:  STM to L>R side upper trap, levator scap and posterior paraspinals  Suboccipital release with gentle traction    neuromuscular re-education activities to improve: Balance, Coordination, Kinesthetic, Sense, Proprioception, and Posture for 10 minutes.  The following activities were included:  DNF 20 X 3 sec hold  Supine AAROM into shoulder flexion with 2# dowel with cervical DNF engaged    therapeutic activities to improve functional performance for 00  minutes, including:  NP      Patient Education and Home Exercises       Education provided:   - educated on progression of home exercise program at home    Written Home Exercises Provided: Yes. Exercises were reviewed and Courtney was able to demonstrate them prior to the end of the session.  Courtney demonstrated good  understanding of the education provided. See Electronic Medical Record under Patient Instructions for exercises provided during therapy sessions    Assessment     Courtney responded well to session today focusing on scapular stabilization and postural strengthening activity. She reported a good reduction in left side neck tension after session and responded well to new home exercise program      Courtney Is progressing well towards her goals.   Patient prognosis is Good.     Patient will continue to benefit from skilled outpatient physical therapy to address the deficits listed in the problem list box on initial evaluation, provide pt/family education and to maximize pt's level of independence in the home and community environment.     Patient's spiritual, cultural and educational needs considered and pt agreeable to plan of care and goals.     Anticipated barriers to physical therapy: balance issues also    Goals: Short Term Goals: 4 weeks   10 degree cervical spine range of motion improvement  50% reduction in cervical muscle tightness and tenderness  Patient will be able look into blind spot without soreness  50% FOTO score improvement     Long Term Goals: 8 weeks   Formally assess balance and set goals accordingly    Plan     Cont with POC from ALEXANDR Mack, PT, DPT

## 2025-01-30 ENCOUNTER — CLINICAL SUPPORT (OUTPATIENT)
Dept: REHABILITATION | Facility: HOSPITAL | Age: 89
End: 2025-01-30
Payer: MEDICARE

## 2025-01-30 DIAGNOSIS — M54.2 NECK PAIN: Primary | ICD-10-CM

## 2025-01-30 PROCEDURE — 97110 THERAPEUTIC EXERCISES: CPT | Mod: HCNC,PO | Performed by: PHYSICAL THERAPIST

## 2025-01-30 PROCEDURE — 97112 NEUROMUSCULAR REEDUCATION: CPT | Mod: HCNC,PO | Performed by: PHYSICAL THERAPIST

## 2025-01-30 NOTE — PROGRESS NOTES
[facility]   Physical Therapy Treatment Note      Name: Courtney Plummer  Clinic Number: [id number]    Therapy Diagnosis:   Encounter Diagnosis   Name Primary?    Neck pain Yes     Physician: John Mitchell MD    Visit Date: 1/30/2025    Physician Orders: PT Eval and Treat   Medical Diagnosis from Referral: M54.2 (ICD-10-CM) - Neck pain  Evaluation Date: 1/2/2025  Authorization Period Expiration: 12/5/2025  Plan of Care Expiration: 4/2/2025  Progress Note Due: 2/2/2025  Date of Surgery: n/a  Visit # / Visits authorized: 2/20   FOTO: 1/ 3     Precautions: Fall      Time In: 1000 am  Time Out: 1040 am  Total Billable Time: 40 minutes    PTA Visit #: 0/5       Subjective     Patient reports:the pain feels like its jumped to the left side, feeling a little more sore in the mid back after some of the banded activity so she stopped those  She was compliant with home exercise program.  Response to previous treatment: no issues  Functional change: in progress    Pain: 5/10  Location: left neck      Objective      Objective Measures updated at progress report unless specified.     Treatment     Courtney received the treatments listed below:      therapeutic exercises to develop strength, endurance, ROM, flexibility, posture, and core stabilization for 20 minutes including:  Scap pinches 20 X 3 sec hold  R side UT stretch  Supine horizontal abduction YTB 2 X 10  Seated bilateral external rotation YTB 2 X 10  Open books X 15 each side  +rows RTB X 20  +extension RTB X 20    manual therapy techniques: Joint mobilizations, Manual traction, Myofacial release, Soft tissue Mobilization, and Friction Massage were applied to the: cervical spine for 15 minutes, including:  STM to L>R side upper trap, levator scap and posterior paraspinals  Suboccipital release with gentle traction    neuromuscular re-education activities to improve: Balance, Coordination, Kinesthetic, Sense, Proprioception, and Posture  for 10 minutes. The following activities were included:  DNF 20 X 3 sec hold  Supine AAROM into shoulder flexion with 2# dowel with cervical DNF engaged    therapeutic activities to improve functional performance for 00  minutes, including:  NP      Patient Education and Home Exercises       Education provided:   - educated on progression of home exercise program at home    Written Home Exercises Provided: Yes. Exercises were reviewed and Courtney was able to demonstrate them prior to the end of the session.  Courtney demonstrated good  understanding of the education provided. See Electronic Medical Record under Patient Instructions for exercises provided during therapy sessions    Assessment     Courtney continues to progress well overall and she was able to perform standing exercises with good control. Decreased overall pain after session but she did have more dizziness up one sitting up    Courtney Is progressing well towards her goals.   Patient prognosis is Good.     Patient will continue to benefit from skilled outpatient physical therapy to address the deficits listed in the problem list box on initial evaluation, provide pt/family education and to maximize pt's level of independence in the home and community environment.     Patient's spiritual, cultural and educational needs considered and pt agreeable to plan of care and goals.     Anticipated barriers to physical therapy: balance issues also    Goals: Short Term Goals: 4 weeks   10 degree cervical spine range of motion improvement  50% reduction in cervical muscle tightness and tenderness  Patient will be able look into blind spot without soreness  50% FOTO score improvement     Long Term Goals: 8 weeks   Formally assess balance and set goals accordingly    Plan     Cont with POC from ALEXANDR Mack, PT, DPT

## 2025-02-04 ENCOUNTER — TELEPHONE (OUTPATIENT)
Dept: PRIMARY CARE CLINIC | Facility: CLINIC | Age: 89
End: 2025-02-04
Payer: MEDICARE

## 2025-02-04 NOTE — TELEPHONE ENCOUNTER
----- Message from Keli sent at 2/4/2025  9:02 AM CST -----  .1MEDICALADVICE     Patient is calling for Medical Advice regarding: pt would like to know if she can include balance exercises in her PT, she would like to know if this is ok. Please call and advise.     How long has patient had these symptoms:    Pharmacy name and phone#:    Patient wants a call back or thru [facility]:    Comments:    Please advise patient replies from provider may take up to 48 hours.

## 2025-02-05 ENCOUNTER — TELEPHONE (OUTPATIENT)
Dept: PRIMARY CARE CLINIC | Facility: CLINIC | Age: 89
End: 2025-02-05
Payer: MEDICARE

## 2025-02-05 NOTE — TELEPHONE ENCOUNTER
----- Message from Wanda sent at 2/5/2025  2:00 PM CST -----  Contact: [phone] Patient  Patient is returning a phone call.    Who left a message for the patient: Ros Wilde MA    Does patient know what this is regarding:  Patient is calling for Medical Advice regarding: pt would like to know if she can include balance exercises in her PT, she would like to know if this is ok. Please call and advise.     Would you like a call back, or a response through your [facility] portal?:  [facility]     Comments:   Daily Note     Today's date: 2021  Patient name: Bel Leon  : [id number]  MRN: [id number]  Referring provider: Olinda Garcia*  Dx:   Encounter Diagnosis     ICD-10-CM    1  Primary osteoarthritis of right knee  M17 11                   Subjective: Patient reports increased knee pain in the last couple of days  He played golf for the first time in 21 years, and played baseball while was on vacation  Cutting the grass yesterday made things worse  He has an appt with an ortho specialist on 10/6, will discuss injections  Objective: See treatment diary below      Assessment: Tolerated treatment well  Manuals post RSB warm up, continues to have significant restrictions in adductors  He is able to complete the rest of the session without increase in sx  Continued PT would be beneficial to improve function           Plan: Continue per plan of care         Precautions:na    Manuals           S/l quad st             IASTM ADD longus 8 8 8          (-) IASTM add  6 6 6                       Neuro Re-Ed             Step ups             Mini Squat               TG LVL  lvl 20 x 20            SLS - foam 10"x 10             Side stepping/Tandem/Bkwd on foam GTB 5 laps                                       Ther Ex             Bike 8 8 8'          SLR 3# 30   30x                                    Gastroc wedge stretch 5 x 20" 5 x 20" 5x20"          TKE             clams             SL SLR  3# 30   30x          HS/ADD St stretch 5x 20" 5 x 20" 5x20"          Standing hip abd/ext GTB 30x  30x GTB          Seated hamstring stretch             Piriformis stretch             N  glide   x10 -          Ther Activity             LP  110# 3 x 10             SLS foam  Ball toss             Gait Training                                       Modalities

## 2025-02-05 NOTE — TELEPHONE ENCOUNTER
Spoke with pt and she states she is starting physical therapy for her neck, and she wants to know if you can include balance exercises on her PT referral?    Please advise if that is okay.

## 2025-02-13 ENCOUNTER — CLINICAL SUPPORT (OUTPATIENT)
Dept: REHABILITATION | Facility: HOSPITAL | Age: 89
End: 2025-02-13
Payer: MEDICARE

## 2025-02-13 DIAGNOSIS — R26.89 BALANCE DISORDER: Primary | ICD-10-CM

## 2025-02-13 DIAGNOSIS — M54.2 NECK PAIN: Primary | ICD-10-CM

## 2025-02-13 PROCEDURE — 97112 NEUROMUSCULAR REEDUCATION: CPT | Mod: HCNC,PO | Performed by: PHYSICAL THERAPIST

## 2025-02-13 PROCEDURE — 97110 THERAPEUTIC EXERCISES: CPT | Mod: HCNC,PO | Performed by: PHYSICAL THERAPIST

## 2025-02-13 NOTE — PROGRESS NOTES
[facility]   Physical Therapy Treatment Note / Reassessment     Name: Courtney Plummer  Clinic Number: [id number]    Therapy Diagnosis:   Encounter Diagnosis   Name Primary?    Neck pain Yes     Physician: John Mitchell MD    Visit Date: 2/13/2025    Physician Orders: PT Eval and Treat   Medical Diagnosis from Referral: M54.2 (ICD-10-CM) - Neck pain  Evaluation Date: 1/2/2025  Authorization Period Expiration: 12/5/2025  Plan of Care Expiration: 4/2/2025  Progress Note Due: 3/13/2025  Date of Surgery: n/a  Visit # / Visits authorized: 3/20   FOTO: 1/ 3     Precautions: Fall      Time In: 1000 am  Time Out: 1040 am  Total Billable Time: 40 minutes    PTA Visit #: 0/5       Subjective     Patient reports:she had had the right side pain return and is showing improvement with range of motion and strength still but the pain has returned recently and has been up to a 7  She was compliant with home exercise program.  Response to previous treatment: no issues  Functional change: in progress    Pain: 2/10  Location: left neck      Objective      Cervical Range of Motion:     Degrees   Flexion 35   Extension 40   Right Rotation 70   Left Rotation 70   Right Side Bend 30   Left Side Bend 30     Palpation: Mod right side tenderness to palpation with increased overall soreness on the right paraspinals and suboccipitals    Joint mobility: limited cranial flexion and right side upglides    Treatment     Courtney received the treatments listed below:      therapeutic exercises to develop strength, endurance, ROM, flexibility, posture, and core stabilization for 20 minutes including:  Scap pinches 20 X 3 sec hold  R side UT stretch  Supine horizontal abduction YTB 2 X 10  Seated bilateral external rotation YTB 2 X 10  Open books X 15 each side  +rows RTB X 20  +extension RTB X 20    manual therapy techniques: Joint mobilizations, Manual traction, Myofacial release, Soft tissue Mobilization, and Friction  Massage were applied to the: cervical spine for 15 minutes, including:  STM to L>R side upper trap, levator scap and posterior paraspinals  Suboccipital release with gentle traction    neuromuscular re-education activities to improve: Balance, Coordination, Kinesthetic, Sense, Proprioception, and Posture for 10 minutes. The following activities were included:  DNF 20 X 3 sec hold  Supine AAROM into shoulder flexion with 2# dowel with cervical DNF engaged  +serratus punch 2# dowel X 20    therapeutic activities to improve functional performance for 00  minutes, including:  NP      Patient Education and Home Exercises       Education provided:   - educated on progression of home exercise program at home    Written Home Exercises Provided: Yes. Exercises were reviewed and Courtney was able to demonstrate them prior to the end of the session.  Courtney demonstrated good  understanding of the education provided. See Electronic Medical Record under Patient Instructions for exercises provided during therapy sessions    Assessment     Courtney presented today with good improvement in her range of motion but has had some tension in the muscles return. She is very concerned about her dizziness and balance and she would like to do vestibular therapy and I will reach out to her referring MD about putting this referral in.    Courtney Is progressing well towards her goals.   Patient prognosis is Good.     Patient will continue to benefit from skilled outpatient physical therapy to address the deficits listed in the problem list box on initial evaluation, provide pt/family education and to maximize pt's level of independence in the home and community environment.     Patient's spiritual, cultural and educational needs considered and pt agreeable to plan of care and goals.     Anticipated barriers to physical therapy: balance issues also    Goals: Short Term Goals: 4 weeks   10 degree cervical spine range of motion  improvement  50% reduction in cervical muscle tightness and tenderness  Patient will be able look into blind spot without soreness  50% FOTO score improvement     Long Term Goals: 8 weeks   Formally assess balance and set goals accordingly    Plan     Cont with POC from IE    Deidra Mack, PT, DPT

## 2025-02-21 NOTE — PROGRESS NOTES
[facility] Progress Note  2/27/2025          Reason for Visit:      had concerns including Neck Pain (Pain 4/10, had a fall in dec but it didn't hurt then but started weeks later).       History of Present Illness:       History of Present Illness    CHIEF COMPLAINT:  Patient presents today for neck and head pain    Neck Pain  She reports neck and head pain with shooting, shocking sensations severe enough to interfere with sleep. She has been attending physical therapy with variable results, noting that pain improves at times but worsens at others. She denies any prior imaging studies of the neck for current condition, though I can see a CT scan of throacic and lumbar spine from 2021 with Dr. Mckeon.  She has a history of five lower back surgeries, four performed by Dr. Mckeon, with hardware placement. No surgical interventions on the neck.  She continues daily 30-minute walks and performs prescribed physical therapy exercises, including balance exercises with 30-second holds on each side.     HTN  She reports taking half of a 10mg Lisinopril tablet daily for well-controlled blood pressure. She denies needing a refill at this time. She has had daily leg swelling for a few months, noting significant sock indentations around her legs each day.     Osteoprosis  She takes alendronate for osteoporosis but is uncertain about the duration of treatment (appears it was started 4/2021).  Reviewed Dexa from 2023.  Due again in 6/2025     Anxiety  She reports ongoing anxiety but manages it without medication, expressing a desire to avoid pharmacological treatment for her anxiety symptoms.       Problem List:     Problem List[1]      Medications:     Current Medications[2]        Review of Systems:     Review of Systems   Constitutional:  Negative for activity change and unexpected weight change.   HENT:  Positive for hearing loss and rhinorrhea. Negative for trouble swallowing.    Eyes:  Positive for  "discharge. Negative for visual disturbance.   Respiratory:  Negative for chest tightness and wheezing.    Cardiovascular:  Negative for chest pain and palpitations.   Gastrointestinal:  Negative for blood in stool, constipation, diarrhea and vomiting.   Endocrine: Negative for polydipsia and polyuria.   Genitourinary:  Negative for difficulty urinating, dysuria, hematuria and menstrual problem.   Musculoskeletal:  Positive for neck pain. Negative for arthralgias and joint swelling.   Neurological:  Negative for weakness and headaches.   Psychiatric/Behavioral:  Negative for confusion and dysphoric mood.            Physical Exam:     Temp:             Blood Pressure:  136/60        Pulse:   77     Respirations:     Weight:   53.9 kg (118 lb 13.3 oz)  Height:   5' 1" (1.549 m)  BMI:     Body mass index is 22.45 kg/m².    Physical Exam  Constitutional:       General: She is not in acute distress.  HENT:      Right Ear: Tympanic membrane normal.      Left Ear: Tympanic membrane normal.      Nose: No congestion or rhinorrhea.      Mouth/Throat:      Pharynx: No oropharyngeal exudate or posterior oropharyngeal erythema.   Cardiovascular:      Rate and Rhythm: Normal rate and regular rhythm.   Pulmonary:      Effort: Pulmonary effort is normal. No respiratory distress.      Breath sounds: No wheezing.   Abdominal:      Palpations: Abdomen is soft.      Tenderness: There is no abdominal tenderness.   Skin:     General: Skin is warm.   Neurological:      General: No focal deficit present.      Mental Status: She is alert and oriented to person, place, and time.           Assessment and Plan:     1. Cervical disc disease  - Assessed cervical pain with associated shooting sensations and limited range of motion, particularly in extension.  - Ordered cervical spine MRI to investigate potential disc problems and determine candidacy for interventional procedures.  - Noted history of degenerative changes in cervical spine from " previous CT.  - Discussed how degeneration of spinal structures occurs with age, potentially contributing to symptoms.  - Referred to physical therapy for neck exercises.  - Instructed the patient to continue physical therapy and exercises to strengthen neck muscles.  - Advised the patient to contact the office when MRI results are available.  - Scheduled follow-up visit to discuss MRI findings and treatment plan.  - Considered cervical lordosis as a potential cause, noting both medical and positional factors.  - Explained that cervical lordosis can be caused by medical conditions or frequent positional habits.  - Noted that the patient's positional habits, such as using an iPad and playing cards, may contribute to cervical kyphosis.  - Advised the patient to be cognizant of posture, recommending sitting with shoulders over hips.  - Explained that cervical lordosis can be worsened by medical conditions like osteoporosis.- has history of throacic and lumbar compression fx.    - MRI Cervical Spine Without Contrast; Future  - Ambulatory Referral/Consult to Physical/Occupational Therapy; Future    2. Essential hypertension  - Continued lisinopril at current dose for hypertension management.    3. Osteoporosis, unspecified osteoporosis type, unspecified pathological fracture presence  - Continued alendronate at current dose for osteoporosis management.  - Continued alendronate at current dose for long-term bisphosphonate therapy.  - plan repeat dexa in june      ANXIETY:  - Acknowledged the patient's anxiety and discussed non-medication management strategies.  - Recommend engaging in social activities and outings to manage anxiety.  - Advised staying active as a way to manage anxiety.    LEG SWELLING:  - Explained the mechanism of leg swelling due to venous valve degeneration, causing blood pooling.  - Instructed the patient to elevate legs to manage swelling.  - Recommend compression stockings for edema  management.    OTHER INSTRUCTIONS:  - Patient to continue daily 30-minute walks.  - Patient to maintain awareness of posture, especially when using iPad or playing cards.  - Recommend practicing sitting with shoulders over hips.  - Referred to Physical therapy for neck exercises and balance training.           John Mitchell MD  2/27/2025    This note was prepared using voice-recognition software.  Although efforts are made to proofread the note, some errors may persist in the final document.         [1]   Patient Active Problem List  Diagnosis    BRIAN generalized anxiety disorder    Benign positional vertigo    Carotid bruit    Chronic low back pain    Lumbar spinal stenosis    Panic attacks    Asymptomatic gallstones    Cyst of meniscus of left knee, 04-.    History of back surgery, 09- Dr. 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 2021    At risk for falling    Frequent falls    Senile purpura    Compressed spine fracture    Major depressive disorder, single episode, in partial remission    Neck pain   [2]   Current Outpatient Medications:     alendronate (FOSAMAX) 70 MG tablet, TAKE 1 TABLET EVERY 7 DAYS. TAKE WITH LARGE GLASS OF WATER. DO NOT LIE FLAT FOR 2 HOURS AFTER TAKING MED (Patient taking differently: every 7 days. TAKE 1 TABLET EVERY 7 DAYS. TAKE WITH LARGE GLASS OF WATER. DO NOT LIE FLAT FOR 2 HOURS AFTER TAKING MED), Disp: 12 tablet,  Rfl: 0    biotin 5,000 mcg Subl, Take 10,000 mcg by mouth once daily. 10,000mcg, Disp: , Rfl:     COLLAGEN MISC, Take 20 g by mouth once daily., Disp: , Rfl:     cyanocobalamin (VITAMIN B-12) 1000 MCG tablet, Take 2,500 mcg by mouth once daily., Disp: , Rfl:     lisinopriL 10 MG tablet, Take 1 tablet (10 mg total) by mouth once daily., Disp: 90 tablet, Rfl: 3    mv-min/iron/folic/calcium/vitK (WOMEN'S MULTIVITAMIN ORAL), Take by mouth once a week. 3 days a week, Disp: , Rfl:     VITAMIN D3 1,000 unit capsule, Take by mouth once daily. 50 mcg, Disp: , Rfl:     vitamin E, dl,tocopheryl acet, (VITAMIN E, DL, ACETATE,) 180 mg (400 unit) Cap, once daily., Disp: , Rfl:

## 2025-02-26 NOTE — PROGRESS NOTES
Refill Decision Note   Emilia Alan  is requesting a refill authorization.  Brief Assessment and Rationale for Refill:  Approve     Medication Therapy Plan:  FLOS      Comments:     Note composed:10:00 AM 02/26/2025             Chief Complaint & History of Present Illness:  Courtney Plummer is a established patient 83 y.o. female who is seen here today for review of lab results, DEXA scan results, and determine a treatment plan for her osteoporosis.  she has reviewed the information provided at her initial visit.  After review of treatment options together we has elected to proceed with Forteo as her treatment option today for her osteoporosis and to reduce risk of future fractures.        Review of patient's allergies indicates:   Allergen Reactions    Sulfa (sulfonamide antibiotics) Anaphylaxis    Macrobid [nitrofurantoin monohyd/m-cryst]      Fever, joint pain    Codeine Anxiety         Current Outpatient Medications   Medication Sig Dispense Refill    BIOTIN ORAL Take by mouth.      ketoconazole (NIZORAL) 2 % shampoo Apply topically twice a week. 120 mL 5    magnesium oxide (MAG-OX) 400 mg tablet       multivitamin (ONE DAILY MULTIVITAMIN) per tablet Take 1 tablet by mouth once daily. Takes 3x weekly      potassium gluconate 595 mg (99 mg) Tab       teriparatide (FORTEO) 20 mcg/dose - 600 mcg/2.4 mL PnIj Inject 0.08 mLs (20 mcg total) into the skin once daily. 2.4 mL 11    triamterene-hydrochlorothiazide 37.5-25 mg (DYAZIDE) 37.5-25 mg per capsule Take 1 capsule by mouth once daily. 1 Capsule Oral Every morning 90 capsule 4    TURMERIC ORAL Take 500 mg by mouth once daily.      VITAMIN D3 1,000 unit capsule        No current facility-administered medications for this visit.        Past Medical History:   Diagnosis Date    Anxiety     Essential hypertension 2018    Hypertension     Kidney stones 12/3/2015       Past Surgical History:   Procedure Laterality Date    ARTHROSCOPY, KNEE Left 2014    [facility]    BACK SURGERY      x3     SECTION, CLASSIC       last     SPINE SURGERY      THYROID SURGERY         Lab Results   Component Value Date      11/06/2018    K 4.3 11/06/2018    CL 99 11/06/2018    CO2 30 (H) 11/06/2018    GLU 93 11/06/2018    BUN 12 11/06/2018    CREATININE 0.8 11/06/2018    CALCIUM 9.4 11/06/2018    PROT 7.3 09/06/2018    ALBUMIN 3.7 09/06/2018    BILITOT 0.5 09/06/2018    ALKPHOS 69 09/06/2018    AST 23 09/06/2018    ALT 29 09/06/2018    ANIONGAP 8 11/06/2018    ESTGFRAFRICA >60 11/06/2018    EGFRNONAA >60 11/06/2018      Lab Results   Component Value Date    WBC 6.16 09/06/2018    RBC 4.22 09/06/2018    HGB 11.9 (L) 09/06/2018    HCT 37.5 09/06/2018    MCV 89 09/06/2018    MCH 28.2 09/06/2018    MCHC 31.7 (L) 09/06/2018    RDW 16.7 (H) 09/06/2018     09/06/2018    MPV 10.2 09/06/2018    GRAN 50.0 09/06/2018    LYMPH CANCELED 09/06/2018    LYMPH 37.0 09/06/2018    MONO CANCELED 09/06/2018    MONO 10.0 09/06/2018    EOS CANCELED 09/06/2018    BASO CANCELED 09/06/2018    EOSINOPHIL 3.0 09/06/2018    BASOPHIL 0.0 09/06/2018    DIFFMETHOD Manual 09/06/2018      No results found for: MG   No results found for: PHOS   Lab Results   Component Value Date    ABLSCZME48SQ 47 09/06/2018      Lab Results   Component Value Date    PTH 58.0 09/06/2018      Lab Results   Component Value Date    TSH 1.162 02/20/2019      Lab Results   Component Value Date    FREET4 1.25 02/20/2013      No results found for: HGBA1C, ESTIMATEDAVG   No results found for: FREETESTOSTE     DEXA Scan was reviewed and demonstrates :     T-Score Hip: -2.9     T-Score Spine: -1.6      T-Score forearm: -2.8          FRAX:      Major Fx.: 27%         Hip Fx.: 9.6%                                                            Vital Signs (Most Recent)  Vitals:    03/28/19 1021   BP: 128/62   Pulse: 64         Review of Systems:  ROS:  Constitutional: no fever or chills  Eyes: no visual changes  ENT: no nasal congestion or sore throat  Respiratory: no respiratory symptoms  Cardiovascular: no chest pain or palpitations, Positive aortic atherosclerosis  Gastrointestinal: no nausea  or vomiting, tolerating diet  Genitourinary: no hematuria or dysuria, History of renal calculi  Integument/Breast: no rash or pruritis  Hematologic/Lymphatic: no easy bruising or lymphadenopathy, History of anemia  Musculoskeletal: positive for arthralgias, back pain, muscle weakness, myalgias and stiff joints, negative for bone pain  Neurological: no seizures or tremors, Positive lumbar stenosis, vertebral compression fraction, peripheral neuropathy,  Behavioral/Psych: no auditory or visual hallucinations, Generalized anxiety disorder, panic attacks  Endocrine: no heat or cold intolerance, Positive thyroid disorder       Courtney Plummer was given instructions on administration of Forteo today.  She will be contacted by the speciality pharmacy when her medication is available, and will be scheduled to receive further detailed  instruction on  administration or when and where to receive her treatment.     She will continue with her calcium and vitamin D.  Fall prevention and personal safety have been reviewed.    We have discussed the need for core strengthening and balance exercises for fall prevention, we may consider formal physical therapy at her next visit.    Assessment and plan:    Osteoporosis    Teriparatide 80mcg sub q daily    Follow up 1-2 weeks after initiation of treatment to check Calcium, Vitamin D levels and access tolerance to medication.     Will contact the specialty pharmacy to arrange for training in the administration today.  She still has paperwork pending for pain min assistance after her 1st month of treatment

## 2025-02-27 ENCOUNTER — OFFICE VISIT (OUTPATIENT)
Dept: PRIMARY CARE CLINIC | Facility: CLINIC | Age: 89
End: 2025-02-27
Payer: MEDICARE

## 2025-02-27 VITALS
DIASTOLIC BLOOD PRESSURE: 60 MMHG | HEIGHT: 61 IN | HEART RATE: 77 BPM | BODY MASS INDEX: 22.43 KG/M2 | OXYGEN SATURATION: 97 % | SYSTOLIC BLOOD PRESSURE: 136 MMHG | WEIGHT: 118.81 LBS

## 2025-02-27 DIAGNOSIS — M50.90 CERVICAL DISC DISEASE: Primary | ICD-10-CM

## 2025-02-27 DIAGNOSIS — M81.0 OSTEOPOROSIS, UNSPECIFIED OSTEOPOROSIS TYPE, UNSPECIFIED PATHOLOGICAL FRACTURE PRESENCE: ICD-10-CM

## 2025-02-27 DIAGNOSIS — I10 ESSENTIAL HYPERTENSION: ICD-10-CM

## 2025-02-27 PROCEDURE — 99999 PR PBB SHADOW E&M-EST. PATIENT-LVL IV: CPT | Mod: PBBFAC,,, | Performed by: INTERNAL MEDICINE

## 2025-03-06 ENCOUNTER — CLINICAL SUPPORT (OUTPATIENT)
Dept: REHABILITATION | Facility: HOSPITAL | Age: 89
End: 2025-03-06
Payer: MEDICARE

## 2025-03-06 DIAGNOSIS — M50.90 CERVICAL DISC DISEASE: ICD-10-CM

## 2025-03-06 DIAGNOSIS — M54.2 NECK PAIN: Primary | ICD-10-CM

## 2025-03-06 PROCEDURE — 97112 NEUROMUSCULAR REEDUCATION: CPT | Mod: HCNC,PO | Performed by: PHYSICAL THERAPIST

## 2025-03-06 PROCEDURE — 97110 THERAPEUTIC EXERCISES: CPT | Mod: HCNC,PO | Performed by: PHYSICAL THERAPIST

## 2025-03-06 NOTE — PROGRESS NOTES
[facility]   Physical Therapy Treatment Note / Reassessment     Name: Courtney Plummer  Clinic Number: [id number]    Therapy Diagnosis:   Encounter Diagnoses   Name Primary?    Cervical disc disease     Neck pain Yes     Physician: John Mitchell MD    Visit Date: 3/6/2025    Physician Orders: PT Eval and Treat   Medical Diagnosis from Referral: M54.2 (ICD-10-CM) - Neck pain  Evaluation Date: 1/2/2025  Authorization Period Expiration: 12/5/2025  Plan of Care Expiration: 4/2/2025  Progress Note Due: 4/13/2025  Date of Surgery: n/a  Visit # / Visits authorized: 4/20   FOTO: 1/ 3     Precautions: Fall      Time In: 1006 am  Time Out: 1040 am  Total Billable Time: 40 minutes    PTA Visit #: 0/5       Subjective     Patient reports: MRI tomorrow for the neck and head, 2 days ago she had no pain, but she is up to around a 6 today without reason. Looking up and the right continues to reduce pain, she feels like more of the balance and dizziness are her problem more than neck pain  She was compliant with home exercise program.  Response to previous treatment: no issues  Functional change: in progress    Pain: 2/10  Location: left neck      Objective      Cervical Range of Motion:     Degrees   Flexion 35   Extension 40   Right Rotation 70   Left Rotation 58*   Right Side Bend 30   Left Side Bend 30     Palpation: Mod right side tenderness to palpation with increased overall soreness on the right paraspinals and suboccipitals - 50% improved    Joint mobility: limited cranial flexion and right side upglides - remains    Treatment     Courtney received the treatments listed below:      therapeutic exercises to develop strength, endurance, ROM, flexibility, posture, and core stabilization for 20 minutes including:   reassessment  Scap pinches 20 X 3 sec hold  R side UT stretch  Supine horizontal abduction YTB 2 X 10  Seated bilateral external rotation YTB 2 X 10  Open books X 15 each side  rows RTB  X 20  extension RTB X 20    manual therapy techniques: Joint mobilizations, Manual traction, Myofacial release, Soft tissue Mobilization, and Friction Massage were applied to the: cervical spine for 15 minutes, including:  STM to L>R side upper trap, levator scap and posterior paraspinals  Suboccipital release with gentle traction    neuromuscular re-education activities to improve: Balance, Coordination, Kinesthetic, Sense, Proprioception, and Posture for 10 minutes. The following activities were included:  DNF 20 X 3 sec hold  Supine AAROM into shoulder flexion with 2# dowel with cervical DNF engaged X 20  +serratus punch 2# dowel X 30  Scapular depression with bilateral UE flexion X 20 - YTB    therapeutic activities to improve functional performance for 00  minutes, including:  NP      Patient Education and Home Exercises       Education provided:   - educated on progression of home exercise program at home    Written Home Exercises Provided: Yes. Exercises were reviewed and Courtney was able to demonstrate them prior to the end of the session.  Courtney demonstrated good  understanding of the education provided. See Electronic Medical Record under Patient Instructions for exercises provided during therapy sessions    Assessment     Courtney presented today with good improvement in range of motion to the right but was more limited to the left today with increased right side tightness. She has a good amount of muscle guarding, and she has challenges grasping that arthritic changes are likely he source of her pain and muscle guarding. She is undergoing an MRI tomorrow to assess for oher pathological changes    Courtney Is progressing well towards her goals.   Patient prognosis is Good.     Patient will continue to benefit from skilled outpatient physical therapy to address the deficits listed in the problem list box on initial evaluation, provide pt/family education and to maximize pt's level of independence in  the home and community environment.     Patient's spiritual, cultural and educational needs considered and pt agreeable to plan of care and goals.     Anticipated barriers to physical therapy: balance issues also    Goals: Short Term Goals: 4 weeks -  progressing from IE but stalled  10 degree cervical spine range of motion improvement  50% reduction in cervical muscle tightness and tenderness  Patient will be able look into blind spot without soreness  50% FOTO score improvement     Long Term Goals: 8 weeks   15 degree range of motion improvement in cervical spine  1/2 grade shoulder strength improvement  Patient will be able to lift overhead without UT compensations    Plan     Cont with POC 1 X for 4 additional weeks    Deidra Mack, PT, DPT

## 2025-03-07 ENCOUNTER — RESULTS FOLLOW-UP (OUTPATIENT)
Dept: PRIMARY CARE CLINIC | Facility: CLINIC | Age: 89
End: 2025-03-07
Payer: MEDICARE

## 2025-03-07 ENCOUNTER — HOSPITAL ENCOUNTER (OUTPATIENT)
Dept: RADIOLOGY | Facility: HOSPITAL | Age: 89
Discharge: HOME | End: 2025-03-07
Attending: INTERNAL MEDICINE
Payer: MEDICARE

## 2025-03-07 DIAGNOSIS — M50.90 CERVICAL DISC DISEASE: ICD-10-CM

## 2025-03-07 DIAGNOSIS — M50.90 CERVICAL DISC DISEASE: Primary | ICD-10-CM

## 2025-03-07 PROCEDURE — 72141 MRI NECK SPINE W/O DYE: CPT | Mod: TC

## 2025-03-07 PROCEDURE — 72141 MRI NECK SPINE W/O DYE: CPT | Mod: 26,,, | Performed by: INTERNAL MEDICINE

## 2025-03-08 ENCOUNTER — PATIENT MESSAGE (OUTPATIENT)
Dept: PRIMARY CARE CLINIC | Facility: CLINIC | Age: 89
End: 2025-03-08
Payer: MEDICARE

## 2025-03-10 NOTE — TELEPHONE ENCOUNTER
LOV with John Mitchell MD , 2/27/2025  See pts reply to your result note regarding recent MRI. You mentioned her seeing pain management or NS to further eval. Pt also would like to know should she continue PT

## 2025-03-12 NOTE — PROGRESS NOTES
[facility]   Physical Therapy Treatment Note      Name: Courtney Plummer  Clinic Number: [id number]    Therapy Diagnosis:   Encounter Diagnosis   Name Primary?    Neck pain Yes       Physician: John Mitchell MD    Visit Date: 3/13/2025    Physician Orders: PT Eval and Treat   Medical Diagnosis from Referral: M54.2 (ICD-10-CM) - Neck pain  Evaluation Date: 1/2/2025  Authorization Period Expiration: 12/5/2025  Plan of Care Expiration: 4/2/2025  Progress Note Due: 4/13/2025  Date of Surgery: n/a  Visit # / Visits authorized: 5/20   FOTO: 1/ 3     Precautions: Fall      Time In: 10:00 am  Time Out: 10:33 am  Total Billable Time: 23 minutes    PTA Visit #: 1/5       Subjective     Patient reports:she got the results of her MRI and she wasn't sure if she should continue with the exercises.   She was compliant with home exercise program.  Response to previous treatment: no issues  Functional change: in progress    Pain: 2/10  Location: left neck      Objective        Treatment     Courtney received the treatments listed below:      therapeutic exercises to develop strength, endurance, ROM, flexibility, posture, and core stabilization for 18 minutes including:   reassessment  Scap pinches 20 X 3 sec hold  R side UT stretch  Supine horizontal abduction YTB 2 X 10  Seated bilateral external rotation YTB 2 X 10  Open books X 15 each side  rows RTB X 20  extension RTB X 20    manual therapy techniques: Joint mobilizations, Manual traction, Myofacial release, Soft tissue Mobilization, and Friction Massage were applied to the: cervical spine for 00 minutes, including:  STM to L>R side upper trap, levator scap and posterior paraspinals  Suboccipital release with gentle traction    neuromuscular re-education activities to improve: Balance, Coordination, Kinesthetic, Sense, Proprioception, and Posture for 15 minutes. The following activities were included:  DNF 20 X 3 sec hold  Supine AAROM into  shoulder flexion with 2# dowel with cervical DNF engaged X 20  serratus punch 2# dowel X 30  Scapular depression with bilateral UE flexion X 20 - YTB    therapeutic activities to improve functional performance for 00  minutes, including:  NP      Patient Education and Home Exercises       Education provided:   - educated on progression of home exercise program at home    Written Home Exercises Provided: Yes. Exercises were reviewed and Courtney was able to demonstrate them prior to the end of the session.  Courtney demonstrated good  understanding of the education provided. See Electronic Medical Record under Patient Instructions for exercises provided during therapy sessions    Assessment     Courtney reports getting her MRI results with shows possible pressure on her spinal cord. She plans to message the doctor back to get a referral to pain management. Discussed with primary PT, Deidra Mack DPT who recommended to continue with postural strengthening to tolerance. She was able to complete postural strengthening exercises with good cervical support and no increase pains or symptoms. She was instructed to stop exercises if having increased symptoms. She reports a decrease in pain and symptoms with proper chin tuck. Will progress as able unless otherwise advised by MD.     Courtney Is progressing well towards her goals.   Patient prognosis is Good.     Patient will continue to benefit from skilled outpatient physical therapy to address the deficits listed in the problem list box on initial evaluation, provide pt/family education and to maximize pt's level of independence in the home and community environment.     Patient's spiritual, cultural and educational needs considered and pt agreeable to plan of care and goals.     Anticipated barriers to physical therapy: balance issues also    Goals: Short Term Goals: 4 weeks -  progressing from IE but stalled  10 degree cervical spine range of motion improvement  50%  reduction in cervical muscle tightness and tenderness  Patient will be able look into blind spot without soreness  50% FOTO score improvement     Long Term Goals: 8 weeks   15 degree range of motion improvement in cervical spine  1/2 grade shoulder strength improvement  Patient will be able to lift overhead without UT compensations    Plan     Cont with POC 1 X for 4 additional weeks    Esther Medina, SUJIT

## 2025-03-13 ENCOUNTER — CLINICAL SUPPORT (OUTPATIENT)
Dept: REHABILITATION | Facility: HOSPITAL | Age: 89
End: 2025-03-13
Payer: MEDICARE

## 2025-03-13 DIAGNOSIS — M54.2 NECK PAIN: Primary | ICD-10-CM

## 2025-03-13 PROCEDURE — 97112 NEUROMUSCULAR REEDUCATION: CPT | Mod: HCNC,PO,CQ

## 2025-03-13 PROCEDURE — 97110 THERAPEUTIC EXERCISES: CPT | Mod: HCNC,PO,CQ

## 2025-03-20 ENCOUNTER — CLINICAL SUPPORT (OUTPATIENT)
Dept: REHABILITATION | Facility: HOSPITAL | Age: 89
End: 2025-03-20
Payer: MEDICARE

## 2025-03-20 DIAGNOSIS — M54.2 NECK PAIN: Primary | ICD-10-CM

## 2025-03-20 PROCEDURE — 97110 THERAPEUTIC EXERCISES: CPT | Mod: PO | Performed by: PHYSICAL THERAPIST

## 2025-03-20 PROCEDURE — 97112 NEUROMUSCULAR REEDUCATION: CPT | Mod: PO | Performed by: PHYSICAL THERAPIST

## 2025-03-20 NOTE — PROGRESS NOTES
[facility]   Physical Therapy Treatment Note      Name: Courtney Plummer  Clinic Number: [id number]    Therapy Diagnosis:   Encounter Diagnosis   Name Primary?    Neck pain Yes       Physician: John Mitchell MD    Visit Date: 3/20/2025    Physician Orders: PT Eval and Treat   Medical Diagnosis from Referral: M54.2 (ICD-10-CM) - Neck pain  Evaluation Date: 1/2/2025  Authorization Period Expiration: 12/5/2025  Plan of Care Expiration: 4/2/2025  Progress Note Due: 4/13/2025  Date of Surgery: n/a  Visit # / Visits authorized: 6/20   FOTO: 1/ 3     Precautions: Fall      Time In: 10:00 am  Time Out: 10:40 am  Total Billable Time: 40 minutes    PTA Visit #: 1/5       Subjective     Patient reports: needs a new handout for home exercise program, did not go to pain management as her neck is feeling much better, just sore at times  She was compliant with home exercise program.  Response to previous treatment: no issues  Functional change: in progress    Pain: 2/10  Location: left neck      Objective        Treatment     Courtney received the treatments listed below:      therapeutic exercises to develop strength, endurance, ROM, flexibility, posture, and core stabilization for 25 minutes including:   Education on POC with last 2 visits  Scap pinches 20 X 3 sec hold  R side UT stretch  Supine horizontal abduction YTB 2 X 10  Seated bilateral external rotation YTB 2 X 10  Open books X 15 each side  rows RTB X 20  extension RTB X 20    manual therapy techniques: Joint mobilizations, Manual traction, Myofacial release, Soft tissue Mobilization, and Friction Massage were applied to the: cervical spine for 00 minutes, including:  STM to L>R side upper trap, levator scap and posterior paraspinals  Suboccipital release with gentle traction    neuromuscular re-education activities to improve: Balance, Coordination, Kinesthetic, Sense, Proprioception, and Posture for 15 minutes. The following activities  were included:  DNF 20 X 3 sec hold  Supine AAROM into shoulder flexion with 3# dowel with cervical DNF engaged X 20  serratus punch 3# dowel X 30  Scapular depression with bilateral UE flexion X 20 - YTB    therapeutic activities to improve functional performance for 00  minutes, including:  NP      Patient Education and Home Exercises       Education provided:   - educated on progression of home exercise program at home    Written Home Exercises Provided: Yes. Exercises were reviewed and Courtney was able to demonstrate them prior to the end of the session.  Courtney demonstrated good  understanding of the education provided. See Electronic Medical Record under Patient Instructions for exercises provided during therapy sessions    Assessment     Courtney is having less neck pain and only soreness. She declined going to pain management and wants to manage at home with exercises. We will continue to progress strengthening for 2 remaining visits then she will transition to neuro for dizziness    Courtney Is progressing well towards her goals.   Patient prognosis is Good.     Patient will continue to benefit from skilled outpatient physical therapy to address the deficits listed in the problem list box on initial evaluation, provide pt/family education and to maximize pt's level of independence in the home and community environment.     Patient's spiritual, cultural and educational needs considered and pt agreeable to plan of care and goals.     Anticipated barriers to physical therapy: balance issues also    Goals: Short Term Goals: 4 weeks -  progressing from IE but stalled  10 degree cervical spine range of motion improvement  50% reduction in cervical muscle tightness and tenderness  Patient will be able look into blind spot without soreness  50% FOTO score improvement     Long Term Goals: 8 weeks   15 degree range of motion improvement in cervical spine  1/2 grade shoulder strength improvement  Patient will be  able to lift overhead without UT compensations    Plan     DC after remaining visits    Deidra Mack, PT, DPT

## 2025-03-27 ENCOUNTER — CLINICAL SUPPORT (OUTPATIENT)
Dept: REHABILITATION | Facility: HOSPITAL | Age: 89
End: 2025-03-27
Payer: MEDICARE

## 2025-03-27 DIAGNOSIS — M54.2 NECK PAIN: Primary | ICD-10-CM

## 2025-03-27 PROCEDURE — 97140 MANUAL THERAPY 1/> REGIONS: CPT | Mod: PO | Performed by: PHYSICAL THERAPIST

## 2025-03-27 PROCEDURE — 97112 NEUROMUSCULAR REEDUCATION: CPT | Mod: PO | Performed by: PHYSICAL THERAPIST

## 2025-03-27 PROCEDURE — 97110 THERAPEUTIC EXERCISES: CPT | Mod: PO | Performed by: PHYSICAL THERAPIST

## 2025-03-27 NOTE — PROGRESS NOTES
[facility]   Physical Therapy Treatment Note      Name: Courtney Plummer  Clinic Number: [id number]    Therapy Diagnosis:   Encounter Diagnosis   Name Primary?    Neck pain Yes       Physician: John Mitchell MD    Visit Date: 3/27/2025    Physician Orders: PT Eval and Treat   Medical Diagnosis from Referral: M54.2 (ICD-10-CM) - Neck pain  Evaluation Date: 1/2/2025  Authorization Period Expiration: 12/5/2025  Plan of Care Expiration: 4/2/2025  Progress Note Due: 4/13/2025  Date of Surgery: n/a  Visit # / Visits authorized: 6/20   FOTO: 1/ 3     Precautions: Fall      Time In: 10:00 am  Time Out: 10:40 am  Total Billable Time: 38 minutes    PTA Visit #: 1/5       Subjective     Patient reports: feeling about the same, new exercises are going but she is not feeling any different yet  She was compliant with home exercise program.  Response to previous treatment: no issues  Functional change: in progress    Pain: 2/10  Location: left neck      Objective        Treatment     Courtney received the treatments listed below:      therapeutic exercises to develop strength, endurance, ROM, flexibility, posture, and core stabilization for 25 minutes including:   Education on POC with last visit next week  UBE 2'/2'  Scap pinches 20 X 3 sec hold  R side UT stretch  Supine horizontal abduction YTB 2 X 10  Seated bilateral external rotation YTB 2 X 10  Open books X 15 each side  rows RTB X 20  extension RTB X 20    manual therapy techniques: Joint mobilizations, Manual traction, Myofacial release, Soft tissue Mobilization, and Friction Massage were applied to the: cervical spine for 10 minutes, including:  STM to L>R side upper trap, levator scap and posterior paraspinals  Suboccipital release with gentle traction    neuromuscular re-education activities to improve: Balance, Coordination, Kinesthetic, Sense, Proprioception, and Posture for 15 minutes. The following activities were included:  DNF 20 X  3 sec hold- educated on performing in seated position today  Supine AAROM into shoulder flexion with 3# dowel with cervical DNF engaged X 20  serratus punch 3# dowel X 30  Scapular depression with bilateral UE flexion X 20 - YTB    therapeutic activities to improve functional performance for 00  minutes, including:  NP      Patient Education and Home Exercises       Education provided:   - educated on progression of home exercise program at home    Written Home Exercises Provided: Yes. Exercises were reviewed and Courtney was able to demonstrate them prior to the end of the session.  Courtney demonstrated good  understanding of the education provided. See Electronic Medical Record under Patient Instructions for exercises provided during therapy sessions    Assessment     Courtney is maintaining good muscle activation, but continues to struggle with proper muscle engagement. She has hit a plateau in her therapy for her neck pain and she is scheduled for dizziness/balance eval next Friday. We will DC at next visit    Courtney Is progressing well towards her goals.   Patient prognosis is Good.     Patient will continue to benefit from skilled outpatient physical therapy to address the deficits listed in the problem list box on initial evaluation, provide pt/family education and to maximize pt's level of independence in the home and community environment.     Patient's spiritual, cultural and educational needs considered and pt agreeable to plan of care and goals.     Anticipated barriers to physical therapy: balance issues also    Goals: Short Term Goals: 4 weeks -  progressing from IE but stalled  10 degree cervical spine range of motion improvement  50% reduction in cervical muscle tightness and tenderness  Patient will be able look into blind spot without soreness  50% FOTO score improvement     Long Term Goals: 8 weeks   15 degree range of motion improvement in cervical spine  1/2 grade shoulder strength  improvement  Patient will be able to lift overhead without UT compensations    Plan     DC next visit    Deidra Mack, PT, DPT

## 2025-04-03 ENCOUNTER — CLINICAL SUPPORT (OUTPATIENT)
Dept: REHABILITATION | Facility: HOSPITAL | Age: 89
End: 2025-04-03
Payer: MEDICARE

## 2025-04-03 DIAGNOSIS — M54.2 NECK PAIN: Primary | ICD-10-CM

## 2025-04-03 PROCEDURE — 97112 NEUROMUSCULAR REEDUCATION: CPT | Mod: HCNC,PO | Performed by: PHYSICAL THERAPIST

## 2025-04-03 PROCEDURE — 97110 THERAPEUTIC EXERCISES: CPT | Mod: HCNC,PO | Performed by: PHYSICAL THERAPIST

## 2025-04-03 NOTE — PROGRESS NOTES
[facility]   Physical Therapy Treatment Note / Discharge Visit     Name: Courtney Plummer  Clinic Number: [id number]    Therapy Diagnosis:   Encounter Diagnosis   Name Primary?    Neck pain Yes         Physician: John Mitchell MD    Visit Date: 4/3/2025    Physician Orders: PT Eval and Treat   Medical Diagnosis from Referral: M54.2 (ICD-10-CM) - Neck pain  Evaluation Date: 1/2/2025  Authorization Period Expiration: 12/5/2025  Plan of Care Expiration: 4/2/2025  Progress Note Due: 4/13/2025  Date of Surgery: n/a  Visit # / Visits authorized: 8/20   FOTO: 1/ 3     Precautions: Fall      Time In: 10:00 am  Time Out: 10:40 am  Total Billable Time: 25 minutes    PTA Visit #: 1/5       Subjective     Patient reports: doing her exercises at work, feeling better and pain is staying lower in the neck and no headaches, ready to transition to dizziness therapy next week  She was compliant with home exercise program.  Response to previous treatment: no issues  Functional change: in progress    Pain: 2/10  Location: left neck      Objective      Cervical Range of Motion:     Degrees   Flexion 35   Extension 48   Right Rotation 73   Left Rotation 72   Right Side Bend 30   Left Side Bend 30       Treatment     Courtney received the treatments listed below:      therapeutic exercises to develop strength, endurance, ROM, flexibility, posture, and core stabilization for 15 minutes including:   Education on POC with last visit next week  UBE 2'/2'  Scap pinches 20 X 3 sec hold  R side UT stretch  Supine horizontal abduction YTB 2 X 10  Seated bilateral external rotation YTB 2 X 10  Open books X 15 each side  rows RTB X 30  extension RTB X 30    manual therapy techniques: Joint mobilizations, Manual traction, Myofacial release, Soft tissue Mobilization, and Friction Massage were applied to the: cervical spine for 10 minutes, including:  STM to L>R side upper trap, levator scap and posterior  paraspinals  Suboccipital release with gentle traction    neuromuscular re-education activities to improve: Balance, Coordination, Kinesthetic, Sense, Proprioception, and Posture for 15 minutes. The following activities were included:  DNF 20 X 3 sec hold- educated on performing in seated position today  Supine AAROM into shoulder flexion with 3# dowel with cervical DNF engaged X 20  serratus punch 3# dowel X 30  Scapular depression with bilateral UE flexion X 20 - YTB    therapeutic activities to improve functional performance for 00  minutes, including:  NP      Patient Education and Home Exercises       Education provided:   - educated on progression of home exercise program at home    Written Home Exercises Provided: Yes. Exercises were reviewed and Courtney was able to demonstrate them prior to the end of the session.  Courtney demonstrated good  understanding of the education provided. See Electronic Medical Record under Patient Instructions for exercises provided during therapy sessions    Assessment     Courtney has made good overall progress with her neck pain and reports < 1-2/10 at the worst. She is ready for discharge and has an indep home exercise program to focus on.     Courtney Is progressing well towards her goals.   Patient prognosis is Good.     Patient will continue to benefit from skilled outpatient physical therapy to address the deficits listed in the problem list box on initial evaluation, provide pt/family education and to maximize pt's level of independence in the home and community environment.     Patient's spiritual, cultural and educational needs considered and pt agreeable to plan of care and goals.     Anticipated barriers to physical therapy: balance issues also    Goals: Short Term Goals: 4 weeks -  progressing from IE but stalled  10 degree cervical spine range of motion improvement  50% reduction in cervical muscle tightness and tenderness  Patient will be able look into blind  spot without soreness  50% FOTO score improvement     Long Term Goals: 8 weeks   15 degree range of motion improvement in cervical spine  1/2 grade shoulder strength improvement  Patient will be able to lift overhead without UT compensations    Plan     DC from ortho PT    Deidra Mack, PT, DPT

## 2025-04-08 DIAGNOSIS — M81.0 POST-MENOPAUSAL OSTEOPOROSIS: ICD-10-CM

## 2025-04-08 RX ORDER — ALENDRONATE SODIUM 70 MG/1
TABLET ORAL
Qty: 12 TABLET | Refills: 1 | Status: SHIPPED | OUTPATIENT
Start: 2025-04-08

## 2025-04-08 NOTE — TELEPHONE ENCOUNTER
Care Due:                  Date            Visit Type   Department     Provider  --------------------------------------------------------------------------------                                MYCHART                              FOLLOWUP/OF  [facility]  Last Visit: 02-      [facility] VISIT   SERGEY Mitchell                              EP -                              PRIMARY      [facility]  Next Visit: 05-      CARE ([facility])   SERGEY Mitchell                                                            Last  Test          Frequency    Reason                     Performed    Due Date  --------------------------------------------------------------------------------    Mg Level....  12 months..  alendronate..............  Not Found    Overdue    Phosphate...  12 months..  alendronate..............  Not Found    Overdue    [employer] Embedded Care Due Messages. Reference number: [id number].   4/08/2025 7:42:24 AM CDT  
Please see the attached refill request.  
1.5

## 2025-04-09 ENCOUNTER — CLINICAL SUPPORT (OUTPATIENT)
Dept: REHABILITATION | Facility: HOSPITAL | Age: 89
End: 2025-04-09
Attending: INTERNAL MEDICINE
Payer: MEDICARE

## 2025-04-09 DIAGNOSIS — Z74.09 IMPAIRED FUNCTIONAL MOBILITY, BALANCE, AND ENDURANCE: Primary | ICD-10-CM

## 2025-04-09 DIAGNOSIS — R26.89 BALANCE DISORDER: ICD-10-CM

## 2025-04-09 PROCEDURE — 97161 PT EVAL LOW COMPLEX 20 MIN: CPT | Mod: HCNC,PO

## 2025-04-09 NOTE — PROGRESS NOTES
Outpatient Rehab    Physical Therapy Evaluation (only)    Patient Name: Courtney Plummer  MRN: [id number]  YOB: 1935  Encounter Date: 4/9/2025    Therapy Diagnosis:   Encounter Diagnoses   Name Primary?    Balance disorder     Impaired functional mobility, balance, and endurance Yes     Physician: John Mitchell MD    Physician Orders: Eval and Treat  Medical Diagnosis: Balance disorder    Visit # / Visits Authorized:  1 / 1  Insurance Authorization Period: 2/13/2025 to 2/13/2026  Date of Evaluation: 4/9/2025  Plan of Care Certification: 4/9/2025 to 6/14/2025     Time In: 1430   Time Out: 1515  Total Time: 45   Total Billable Time: 45    Intake Outcome Measure for FOTO Survey    Therapist reviewed FOTO scores for Courtney Plummer on 4/9/2025.   FOTO report - see Media section or FOTO account episode details.     Intake Score:  %         Subjective   History of Present Illness  Courtney is a 89 y.o. female who reports to physical therapy with a chief concern of impaired balance.     The patient reports a medical diagnosis of R26.89 (ICD-10-CM) - Balance disorder.            History of Present Condition/Illness: Pt reports she has neuropathy that has lasted about 2 years. It usually is worse at night and she is scared of falling especially do to that. She has had multiple surgeries which resulted in about 5 back surgeies. She has had a number of near falls but was able to catch herself. She is also scared of going upstairs and going up a curb if she does not have support. She walks 5-7 days a week for about 30 minutes at a time. She wants to improve her balance so she does not have any falls    Living Arrangements  Living Situation  Housing: Home independently  Living Arrangements: Alone        Employment  Employment Status: Retired          Past Medical History/Physical Systems Review:   Courtney Plummer  has a past medical history of Anxiety, Essential hypertension, Graves disease, Hypertension,  and Kidney stones.    Courtney Plummer  has a past surgical history that includes Thyroid surgery; Back surgery; Spine surgery;  section, classic; Fracture surgery; Eye surgery (2019); and Cosmetic surgery ().    Courtney has a current medication list which includes the following prescription(s): alendronate, biotin, collagen, cyanocobalamin, lisinopril, mv-min/iron/folic/calcium/vitk, vitamin d3, and vitamin e (dl, acetate).    Review of patient's allergies indicates:   Allergen Reactions    Sulfa (sulfonamide antibiotics) Anaphylaxis    Macrobid [nitrofurantoin monohyd/m-cryst]      Fever, joint pain    Codeine Anxiety        Objective            Hip Strength - Planes of Motion   Right Strength Right Pain Left Strength Left  Pain   Flexion (L2) 4+   4+     Extension 4   4     ABduction 5         ADduction 5   5     Internal Rotation           External Rotation               Knee Strength   Right Strength Right Pain Left Strength Left  Pain   Flexion (S2) 4+   4+     Prone Flexion           Extension (L3) 4+   4+            Ankle/Foot Strength - Planes of Motion   Right Strength Right Pain Left Strength Left  Pain   Dorsiflexion (L4) 4+   4+     Plantar Flexion (S1) 5   5     Inversion           Eversion           Great Toe Flexion           Great Toe Extension (L5)           Lesser Toes Flexion           Lesser Toes Extension                  Coordination  Coordination Tests  Intact: Right Finger to Nose and Left Finger to Nose                   Four Stage Balance Test  Narrow Base of Support: 30 sec sec  Tandem Stand - Right Foot in Front: 16 sec  Tandem Stand - Left Foot in Front: 10 sec        Single Leg Stand - Right Foot: 2 sec  Single Leg Stand - Left Foot: 2 sec       Timed Up & Go (TUG)  Time: 11 seconds     An older adult who takes >=12 seconds to complete the TUG is at risk for falling.       Sit to Stand Testing  The patient completed 5 sit to stand transfers in 12 sec. no UE support The  patient completed 11 repetitions of a sit to stand transfer in 30 seconds. no UE support             Postural control: MCTSIB: Evaluation 04/19/2025 (Average of 3 trials)   1. Eyes Open/feet together/Firm: 30 seconds   2. Eyes Closed/feet together/Firm:  30 seconds   3. Eyes Open/feet together/Foam:  30 seconds   4. Eyes Closed/feet together/Foam:  8 seconds   TOTAL 98/120      Time Entry(in minutes):  PT Evaluation (Low) Time Entry: 45    Assessment & Plan   Assessment  Courtney presents with a condition of Low complexity.   Presentation of Symptoms: Stable  Will Comorbidities Impact Care: No       Functional Limitations: Activity tolerance, Ambulating on uneven surfaces, Maintaining balance, Getting off the floor, Gait limitations  Impairments: Impaired balance, Impaired physical strength    Patient Goal for Therapy (PT): Improve her balance so she isn't afraid of falling  Prognosis: Good  Assessment Details: Pt is an 89 year olf female who presents to the clinic with a medical diagnosis of R26.89 (ICD-10-CM) - Balance disorder . Pt presents with impaired static balance, decreased LE strength, fear of falling, and impaired overally functional mobility. Pt reports that her fear of falling is her main concern and wants to improve her balance to decrease that risk. Due to time constraints, PT to have pt perform FGA to further assess her dynamic balance but monitor neck pain and stiffness during test to prevent further injury. Pt is a good candidate for OPPT to improve impairments listed above    Plan  From a physical therapy perspective, the patient would benefit from: Skilled Rehab Services    Planned therapy interventions include: Therapeutic exercise, Therapeutic activities, Neuromuscular re-education, Manual therapy, and Gait training.    Planned modalities to include: Electrical stimulation - attended, Cryotherapy (cold pack), and Thermotherapy (hot pack).        Visit Frequency: 1 times Per Week for 8 Weeks.        This plan was discussed with Patient.   Discussion participants: Agreed Upon Plan of Care             Patient's spiritual, cultural, and educational needs considered and patient agreeable to plan of care and goals.           Goals:   Active       LTG       Pt to improve 5 x sit to stand to </=10 seconds to demonstrate improved functional strength endurance and speed       Start:  04/19/25    Expected End:  06/14/25            Pt to improve time on condition 4 by >/= 12 seconds on condition 4 of mCTSB to demonstrate improved static balance       Start:  04/19/25    Expected End:  06/14/25            Pt to perform 6MWT and 10MWT and have appropriate goals determined off respective score       Start:  04/19/25    Expected End:  06/14/25               STG       Pt to demonstrate independence with HEP       Start:  04/19/25    Expected End:  05/17/25            Pt to improve BL hip extension strength by 1/2 grade on MMT to demonstrate improved LE strengthening       Start:  04/19/25    Expected End:  05/17/25            Pt to improve 30 second sit to stand to >/= 13 repetitions to demonstrate improved functional strength endurance       Start:  04/19/25    Expected End:  05/17/25            Pt to improve time on condition 4 by >/= 5 seconds on condition 4 of mCTSB to demonstrate improved static balance       Start:  04/19/25    Expected End:  05/17/25                Enoc Keane, PT

## 2025-04-19 PROBLEM — Z74.09 IMPAIRED FUNCTIONAL MOBILITY, BALANCE, AND ENDURANCE: Status: ACTIVE | Noted: 2025-04-19

## 2025-04-23 NOTE — PROGRESS NOTES
[facility] Progress Note  5/1/2025          Reason for Visit:      is following up on on a few concerns (anxiety, osteoporosis, foot tingling)    History of Present Illness:     Patient presents today for follow-up and to discuss tingling and swelling in feet and lower legs.    Paresthesia foot  She experiences tingling in her feet and lower legs with associated swelling that worsens at night and towards the end of the day. The symptoms are severe enough to wake her from sleep. She denies coldness in her feet. She attributes these symptoms to her previous back surgeries. She also reports balance issues with significant fear of falling, expressing anxiety with each step.    Anxiety  She reports significant anxiety, currently exacerbated by stress related to ongoing court issues involving one of her children. She expresses concern about taking anxiety medication due to existing fatigue and fear of increased somnolence. She has history of taking Xanax.    Itching  She reports chronic all-over body itching primarily affecting the trunk, back, and breast area without associated rash. She has previously been evaluated by an allergist and reports some relief with lotion application.    Osteoporosis  She continues alendronate for osteoporosis. Last bone density scan was performed in June 2023, with next test due in June of current year.    HTN  Lisinopril 10 mg  Feels it has been well-controlled.      Problem List:     Problem List[1]      Medications:     Current Medications[2]      Review of Systems:     Review of Systems   Constitutional:  Negative for chills and fever.   HENT:  Negative for ear pain and sore throat.    Respiratory:  Negative for cough, shortness of breath and wheezing.    Cardiovascular:  Negative for chest pain and palpitations.   Gastrointestinal:  Negative for constipation, diarrhea, nausea and vomiting.   Genitourinary:  Negative for dysuria and hematuria.   Musculoskeletal:   "Negative for joint swelling and joint deformity.   Neurological:  Negative for dizziness and weakness.         Physical Exam:     Temp:             Blood Pressure:  128/64        Pulse:   62     Respirations:  14  Weight:   54.2 kg (119 lb 7.8 oz)  Height:   5' 1" (1.549 m)  BMI:     Body mass index is 22.58 kg/m².    Physical Exam  Constitutional:       General: She is not in acute distress.  HENT:      Right Ear: Tympanic membrane normal.      Left Ear: Tympanic membrane normal.      Nose: No congestion or rhinorrhea.      Mouth/Throat:      Pharynx: No oropharyngeal exudate or posterior oropharyngeal erythema.   Cardiovascular:      Rate and Rhythm: Normal rate and regular rhythm.   Pulmonary:      Effort: Pulmonary effort is normal. No respiratory distress.      Breath sounds: No wheezing.   Abdominal:      Palpations: Abdomen is soft.      Tenderness: There is no abdominal tenderness.   Skin:     General: Skin is warm.   Neurological:      General: No focal deficit present.      Mental Status: She is alert and oriented to person, place, and time.         Labs/Imaging/Testing:     Most recent CBC:  Lab Results   Component Value Date    WBC 4.62 12/10/2024    HGB 11.0 (L) 12/10/2024     12/10/2024    MCV 91 12/10/2024       Most recent Lipids:  Lab Results   Component Value Date    CHOL 176 12/10/2024    HDL 66 12/10/2024    LDLCALC 99.4 12/10/2024    TRIG 53 12/10/2024       Most recent Chemistry:  Lab Results   Component Value Date     12/10/2024    K 4.7 12/10/2024     12/10/2024    CO2 23 12/10/2024    BUN 15 12/10/2024    CREATININE 0.7 12/10/2024    GLU 91 12/10/2024    CALCIUM 8.7 12/10/2024    ALT 13 12/10/2024    AST 18 12/10/2024    ALKPHOS 47 12/10/2024    PROT 6.2 12/10/2024    ALBUMIN 3.8 12/10/2024       Other tests:  Lab Results   Component Value Date    TSH 0.617 12/10/2024    FREET4 0.99 12/10/2024    INR 0.9 10/07/2016    HGBA1C 5.0 12/10/2024    IRON 154 11/13/2023    " FERRITIN 184 03/08/2013    EOKPGQRC83 1215 (H) 11/13/2023    [id number] 31 06/10/2021    SEDRATE 27 (H) 10/07/2016    LIPASE 20 10/08/2017       Assessment and Plan:     Visit Summary:  Tingling and swelling in feet/lower legs potentially related to back surgery. Tingling more likely connected to surgery than swelling.  Evaluated medication options for tingling/swelling:  Avoided recommending gabapentin due to potential side effects in older patients.  Considered Cymbalta (duloxetine) as a possible option for nerve-related pain.  Assessed anxiety symptoms and treatment options:  Discussed daily vs. as-needed medication approaches.  Started buspirone as an off-label, as-needed option without sedative effects.  Reviewed osteoporosis treatment:  On alendronate since 2021 (approximately 4 years). Discussed mechanism of action, long-term effects, and potential transition to injectable treatment (e.g., Prolia, Reclast) if bone density continues to decline.  Evaluated generalized itching complaint:  Ruled out common causes (iron deficiency, thyroid disorders) based on December labs.  Considered potential environmental factors, but lack of rash makes this less likely.  Assessed fall risk and associated anxiety.    - Order bone density scan (DEXA scan) for June  - Consider transitioning from alendronate to Prolia (injectable) based on bone density results  - Continue lisinopril prescription  - Recommend starting balance exercises for mobility issues  - Suggest using compression stockings and elevating legs for swelling  - Propose trying buspirone for anxiety management  - Advise using lotion for pruritus management  - Follow-up in June for bone density test, labs, and reassessment of osteoporosis treatment       1. Essential hypertension  - Patient's blood pressure looked good during the visit.  - Continued lisinopril prescription.    - CBC Auto Differential; Future  - Comprehensive Metabolic Panel; Future  - Lipid Panel;  Future  - Hemoglobin A1C; Future  - TSH; Future    2. Atherosclerosis of aorta  - CBC Auto Differential; Future  - Comprehensive Metabolic Panel; Future  - Lipid Panel; Future  - Hemoglobin A1C; Future  - TSH; Future    3. Anxiety  - Patient reports being a nervous wreck and very anxious, with fear of medications causing somnolence.  - Discussed two main treatment approaches for anxiety: daily medications and as-needed medications, noting side effects.  - Suggested trying buspirone for anxiety, which does not cause sedation and can be used as needed.    4. IFG (impaired fasting glucose)  - Hemoglobin A1C; Future    5. Osteoporosis, unspecified osteoporosis type, unspecified pathological fracture presence  - Patient has significant osteoporosis and history of vertebral compression.  - Patient's bone density scan was last done in June 2023 and is due again next month.  - Ordered bone density scan (DEXA scan) for June.  - Patient has been on bisphosphonates since at least 2021.  - Plan to reassess bone density in June and consider transitioning to Prolia (an injection given every 6 months) if bone density is declining.  - Results will inform decision about continuing alendronate or transitioning to injectable osteoporosis medication.    - DXA Bone Density Axial Skeleton 1 or more sites; Future    - Explained mechanism of peripheral venous insufficiency and its relation to leg swelling.  - Patient reports swelling in feet and lower legs at night, which wakes her up.  - No significant swelling was noted during exam, but acknowledged swelling tends to occur more towards the end of the day.  - Suspected peripheral venous insufficiency due to age-related changes in blood vessels.  - Recommend elevating legs when swelling is more pronounced and using compression stockings to manage the condition.    - Patient reports fear of falling with every step due to balance issues.  - Advised the patient to start balance exercises to  address mobility issues.    - Patient reports an all-over body pruritus for years, affecting trunk and back, with no rash.  - Noted no abnormalities in December labs that would explain pruritus, such as iron deficiency or thyroid disorders.  - Patient uses lotion to manage pruritus, but it does not resolve the issue.        - Follow up in June for bone density scan and labs.              John Mitchell MD  5/1/2025    This note was prepared using voice-recognition software.  Although efforts are made to proofread the note, some errors may persist in the final document.         [1]   Patient Active Problem List  Diagnosis    BRIAN generalized anxiety disorder    Benign positional vertigo    Carotid bruit    Chronic low back pain    Lumbar spinal stenosis    Panic attacks    Asymptomatic gallstones    Cyst of meniscus of left knee, 04-.    History of back surgery, 09- Dr. 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 2021    At risk for falling    Frequent falls    Senile purpura    Compressed spine fracture    Major depressive disorder, single episode, in partial remission    Neck pain    Impaired functional mobility, balance, and endurance   [2]   Current Outpatient Medications:     biotin 5,000 mcg Subl, Take 10,000 mcg by mouth once daily. 10,000mcg, Disp: , Rfl:     COLLAGEN MISC, Take 20 g by mouth once daily., Disp: , Rfl:     cyanocobalamin (VITAMIN  B-12) 1000 MCG tablet, Take 2,500 mcg by mouth once daily., Disp: , Rfl:     mv-min/iron/folic/calcium/vitK (WOMEN'S MULTIVITAMIN ORAL), Take by mouth once a week. 3 days a week, Disp: , Rfl:     VITAMIN D3 1,000 unit capsule, Take by mouth once daily. 50 mcg, Disp: , Rfl:     vitamin E, dl,tocopheryl acet, (VITAMIN E, DL, ACETATE,) 180 mg (400 unit) Cap, once daily., Disp: , Rfl:     alendronate (FOSAMAX) 70 MG tablet, TAKE 1 TABLET EVERY 7 DAYS. TAKE WITH LARGE GLASS OF WATER. DO NOT LIE FLAT FOR 2 HOURS AFTER TAKING MED, Disp: 12 tablet, Rfl: 1    lisinopriL 10 MG tablet, Take 1 tablet (10 mg total) by mouth once daily., Disp: 90 tablet, Rfl: 3

## 2025-05-01 ENCOUNTER — PATIENT MESSAGE (OUTPATIENT)
Dept: PRIMARY CARE CLINIC | Facility: CLINIC | Age: 89
End: 2025-05-01

## 2025-05-01 ENCOUNTER — OFFICE VISIT (OUTPATIENT)
Dept: PRIMARY CARE CLINIC | Facility: CLINIC | Age: 89
End: 2025-05-01
Payer: MEDICARE

## 2025-05-01 VITALS
HEIGHT: 61 IN | SYSTOLIC BLOOD PRESSURE: 128 MMHG | BODY MASS INDEX: 22.56 KG/M2 | WEIGHT: 119.5 LBS | HEART RATE: 62 BPM | OXYGEN SATURATION: 98 % | RESPIRATION RATE: 14 BRPM | DIASTOLIC BLOOD PRESSURE: 64 MMHG

## 2025-05-01 DIAGNOSIS — I10 ESSENTIAL HYPERTENSION: Primary | ICD-10-CM

## 2025-05-01 DIAGNOSIS — I70.0 ATHEROSCLEROSIS OF AORTA: ICD-10-CM

## 2025-05-01 DIAGNOSIS — M81.0 OSTEOPOROSIS, UNSPECIFIED OSTEOPOROSIS TYPE, UNSPECIFIED PATHOLOGICAL FRACTURE PRESENCE: ICD-10-CM

## 2025-05-01 DIAGNOSIS — R73.01 IFG (IMPAIRED FASTING GLUCOSE): ICD-10-CM

## 2025-05-01 DIAGNOSIS — M81.0 POST-MENOPAUSAL OSTEOPOROSIS: ICD-10-CM

## 2025-05-01 DIAGNOSIS — F41.9 ANXIETY: ICD-10-CM

## 2025-05-01 PROCEDURE — 99999 PR PBB SHADOW E&M-EST. PATIENT-LVL III: CPT | Mod: PBBFAC,HCNC,, | Performed by: INTERNAL MEDICINE

## 2025-05-01 RX ORDER — ALENDRONATE SODIUM 70 MG/1
TABLET ORAL
Qty: 12 TABLET | Refills: 1 | Status: SHIPPED | OUTPATIENT
Start: 2025-05-01

## 2025-05-01 RX ORDER — BUSPIRONE HYDROCHLORIDE 5 MG/1
5 TABLET ORAL 2 TIMES DAILY PRN
Qty: 60 TABLET | Refills: 3 | Status: SHIPPED | OUTPATIENT
Start: 2025-05-01 | End: 2026-05-01

## 2025-05-01 RX ORDER — LISINOPRIL 10 MG/1
10 TABLET ORAL DAILY
Qty: 90 TABLET | Refills: 3 | Status: SHIPPED | OUTPATIENT
Start: 2025-05-01 | End: 2026-05-01

## 2025-05-02 NOTE — TELEPHONE ENCOUNTER
LOV with John Mitchell MD , 5/1/2025  Rx for buspar 5mg was sent to pharmacy on yesterday. Concerned b/c it states not to take rx drugs or use alcohol. States that she occasionally takes Tylenol or Advil PM and also drinks a glass of wine 2-3 times a week when dining out.

## 2025-05-05 ENCOUNTER — CLINICAL SUPPORT (OUTPATIENT)
Dept: REHABILITATION | Facility: HOSPITAL | Age: 89
End: 2025-05-05
Payer: MEDICARE

## 2025-05-05 DIAGNOSIS — Z74.09 IMPAIRED FUNCTIONAL MOBILITY, BALANCE, AND ENDURANCE: Primary | ICD-10-CM

## 2025-05-05 PROCEDURE — 97112 NEUROMUSCULAR REEDUCATION: CPT | Mod: HCNC,PO

## 2025-05-05 PROCEDURE — 97530 THERAPEUTIC ACTIVITIES: CPT | Mod: HCNC,PO

## 2025-05-05 PROCEDURE — 97110 THERAPEUTIC EXERCISES: CPT | Mod: HCNC,PO

## 2025-05-05 NOTE — PROGRESS NOTES
"  Outpatient Rehab    Physical Therapy Visit    Patient Name: Courtney Plummer  MRN: [id number]  YOB: 1935  Encounter Date: 5/5/2025    Therapy Diagnosis:   Encounter Diagnosis   Name Primary?    Impaired functional mobility, balance, and endurance Yes     Physician: John Mitchell MD    Physician Orders: Eval and Treat  Medical Diagnosis: Balance disorder    Visit # / Visits Authorized:  1 / 10  Insurance Authorization Period: 4/9/2025 to 7/10/2025  Date of Evaluation: 4/9/2025   Plan of Care Certification: 4/9/2025 to 6/14/2025     PT/PTA:     Number of PTA visits since last PT visit:   Time In: 1615   Time Out: 1700  Total Time (in minutes): 45   Total Billable Time (in minutes): 45    FOTO:  Intake Score:  %  Survey Score 2:  %  Survey Score 3:  %         Subjective   Pt reports of new complaints compared to eval.  Pain reported as 0/10.      Objective            Treatment:  Therapeutic Exercise  TE 1: x 6 mins, sci-fit recumbent stepper, level 1.5, twin peaks  TE 2: 2 x 10 seated hip abd, RTB  Balance/Neuromuscular Re-Education  NMR 1: x 3 laps, 4 square step with pvc pipes, last 2 rounds performed over red mat  NMR 2: 3 x 30" static stance, eyes closed on foam fitter  NMR 3: 2 x 30" modified single limb stance on small blue foam, ea , no UE support  NMR 4: 2 X 30" tandem stance, // bars, SBA, UE touchdown  Therapeutic Activity  TA 1: 1 x 8 sit<>Stand, 2 x 8 sit<>Stand with foam fitter under feet  TA 2: x3 laps, stepping over hurdles, 2 feet between each sally, no UE support  TA 3: 2 x 10, step ups onto foam fitter, UE toucdown  TA 4: x 3 laps ascending<>descending stairs, UE touchdwon, alternate stepping  TA 5: x 3 laps, eyes closed walking forward<>backwards ambulation    Time Entry(in minutes):  Neuromuscular Re-Education Time Entry: 10  Therapeutic Activity Time Entry: 22  Therapeutic Exercise Time Entry: 13    Assessment & Plan   Assessment: Pt tolerated her first follow up treatment well " this afternoon. She reports she continues to mild neuropathy in BL feet and reports mild weakness since evaluation. She reported good tolerance with all activities with limited rest breaks. She demonstrated mild-moderate fear with balance especially with eyes closed and backwards walking. Pt continues to be a good candidate for OPPT to further improve her balance       Patient will continue to benefit from skilled outpatient physical therapy to address the deficits listed in the problem list box on initial evaluation, provide pt/family education and to maximize pt's level of independence in the home and community environment.     Patient's spiritual, cultural, and educational needs considered and patient agreeable to plan of care and goals.           Plan: Focus on static and dynamic balance training    Goals:   Active       LTG       Pt to improve 5 x sit to stand to </=10 seconds to demonstrate improved functional strength endurance and speed       Start:  04/19/25    Expected End:  06/14/25            Pt to improve time on condition 4 by >/= 12 seconds on condition 4 of mCTSB to demonstrate improved static balance       Start:  04/19/25    Expected End:  06/14/25            Pt to perform 6MWT and 10MWT and have appropriate goals determined off respective score       Start:  04/19/25    Expected End:  06/14/25               STG       Pt to demonstrate independence with HEP       Start:  04/19/25    Expected End:  05/17/25            Pt to improve BL hip extension strength by 1/2 grade on MMT to demonstrate improved LE strengthening       Start:  04/19/25    Expected End:  05/17/25            Pt to improve 30 second sit to stand to >/= 13 repetitions to demonstrate improved functional strength endurance       Start:  04/19/25    Expected End:  05/17/25            Pt to improve time on condition 4 by >/= 5 seconds on condition 4 of mCTSB to demonstrate improved static balance       Start:  04/19/25    Expected End:   05/17/25                Enoc Keane, PT

## 2025-05-10 ENCOUNTER — OFFICE VISIT (OUTPATIENT)
Dept: URGENT CARE | Facility: CLINIC | Age: 89
End: 2025-05-10
Payer: MEDICARE

## 2025-05-10 VITALS
SYSTOLIC BLOOD PRESSURE: 161 MMHG | TEMPERATURE: 98 F | BODY MASS INDEX: 22.3 KG/M2 | WEIGHT: 118 LBS | RESPIRATION RATE: 19 BRPM | OXYGEN SATURATION: 97 % | HEART RATE: 80 BPM | DIASTOLIC BLOOD PRESSURE: 74 MMHG

## 2025-05-10 DIAGNOSIS — M54.6 ACUTE LEFT-SIDED THORACIC BACK PAIN: Primary | ICD-10-CM

## 2025-05-10 PROCEDURE — 72070 X-RAY EXAM THORAC SPINE 2VWS: CPT | Mod: FY,S$GLB,, | Performed by: RADIOLOGY

## 2025-05-10 PROCEDURE — 99213 OFFICE O/P EST LOW 20 MIN: CPT | Mod: S$GLB,,, | Performed by: FAMILY MEDICINE

## 2025-05-10 RX ORDER — LIDOCAINE 50 MG/G
1 PATCH TOPICAL DAILY
Qty: 14 PATCH | Refills: 0 | Status: SHIPPED | OUTPATIENT
Start: 2025-05-10 | End: 2025-05-24

## 2025-05-10 NOTE — PROGRESS NOTES
Subjective:      Patient ID: Courtney Plummer is a 89 y.o. female.    Vitals:  weight is 53.5 kg (118 lb). Her oral temperature is 97.9 °F (36.6 °C). Her blood pressure is 161/74 (abnormal) and her pulse is 80. Her respiration is 19 and oxygen saturation is 97%.     Chief Complaint: Back Pain    This is a 89 y.o. female who presents today with a chief complaint of mid back pain that started yesterday early afternoon, she was concerned this was her kidneys, she is currently in exercising for her balance. She denies any ripping, stabbing pains, she says she has pain when she sits,she has some relief with extension. She started the balance exercises last week. Hx of compression fractures, she denies any fever, chills, appetite unchanged. Denies any rashes, she did have shingles in the past. She took advil pm last night and that helped with the pain. She plays competitive bridge 5 days a week, independent, drives, dinner with friends 4 nights a week. She walks 30 min a day, at least 5-6 days a week.  Denies any numbness or tingling or leg weakness.    Back Pain  This is a new problem. The current episode started yesterday. The problem occurs constantly. The problem has been gradually worsening since onset. The pain is present in the lumbar spine. The quality of the pain is described as aching. The pain does not radiate. The pain is at a severity of 6/10. The pain is moderate. The pain is The same all the time. The symptoms are aggravated by sitting and standing. Stiffness is present All day. Pertinent negatives include no abdominal pain, bladder incontinence, bowel incontinence, chest pain, dysuria, fever, headaches, leg pain, numbness, paresis, paresthesias, pelvic pain, perianal numbness, tingling, weakness or weight loss. She has tried nothing for the symptoms. The treatment provided no relief.       Constitution: Negative for fever.   Cardiovascular:  Negative for chest pain.   Gastrointestinal:  Negative for  abdominal pain and bowel incontinence.   Genitourinary:  Negative for dysuria, bladder incontinence and pelvic pain.   Musculoskeletal:  Positive for back pain.   Neurological:  Negative for headaches and numbness.      Objective:     Physical Exam   Constitutional: She is oriented to person, place, and time. She appears well-developed.  Non-toxic appearance. She does not appear ill. No distress.   HENT:   Head: Normocephalic and atraumatic.   Ears:   Right Ear: External ear normal.   Left Ear: External ear normal.   Nose: Nose normal.   Mouth/Throat: Oropharynx is clear and moist.   Eyes: Conjunctivae, EOM and lids are normal. Pupils are equal, round, and reactive to light.   Neck: Trachea normal and phonation normal. Neck supple.   Abdominal: There is no left CVA tenderness and no right CVA tenderness.   Musculoskeletal: Normal range of motion.         General: Normal range of motion.      Thoracic back: She exhibits tenderness.        Back:    Neurological: She is alert and oriented to person, place, and time.   Skin: Skin is warm, dry, intact and not diaphoretic.   Psychiatric: Her speech is normal and behavior is normal. Judgment and thought content normal.   Nursing note and vitals reviewed.    XR THORACIC SPINE AP LATERAL  Result Date: 5/10/2025  EXAMINATION: XR THORACIC SPINE AP LATERAL CLINICAL HISTORY: Pain in thoracic spine TECHNIQUE: Two view examination of the thoracic spine. COMPARISON: 02/20/2023 FINDINGS: There is a levo scoliotic curvature of the thoracolumbar spine.Vertebroplasty changes present at T12 where there is chronic compression deformity.  There is also vertebroplasty change at the L3 level.  Vertebral body heights are relatively well maintained and similar in appearance to the previous study of 2023.  There is multilevel disc space narrowing with endplate sclerosis.  No acute fracture or acute subluxation is suspected.     As above. Electronically signed by: Loretta Ellis MD  Date:    05/10/2025 Time:    11:25      Assessment:     1. Acute left-sided thoracic back pain        Plan:     Possibly MSK will try lidocaine patch and tylenol, and f/u with pcp Monday if no improvement    Called and reviewed x-ray with patient at 11:38 a.m..  Reviewed no new compression deformities noted.  With chronic changing.  Continue same plan of care and follow up with PCP if no improvement.  Denies any ripping or stabbing pain.  She had some pain relief with extension.    Discussed results/diagnosis/plan with patient in clinic. Strict precautions given to patient to monitor for worsening signs and symptoms. Advised to follow up with PCP or specialist.    Explained side effects of medications prescribed with patient and informed him/her to discontinue use if he/she has any side effects and to inform UC or PCP if this occurs. All questions answered. Strict ED verses clinic return precautions stressed and given in depth. Advised if symptoms worsens of fail to improve he/she should go to the Emergency Room. Discharge and follow-up instructions given verbally/printed with the patient who expressed understanding and willingness to comply with my recommendations. Patient voiced understanding and in agreement with current treatment plan. Patient exits the exam room in no acute distress. Conversant and engaged during discharge discussion, verbalized understanding.      Acute left-sided thoracic back pain  -     LIDOcaine (LIDODERM) 5 %; Place 1 patch onto the skin once daily. Remove & Discard patch within 12 hours or as directed by MD for 14 days  Dispense: 14 patch; Refill: 0  -     XR THORACIC SPINE AP LATERAL; Future; Expected date: 05/10/2025

## 2025-05-10 NOTE — PATIENT INSTRUCTIONS
General Discharge Instructions   PLEASE READ YOUR DISCHARGE INSTRUCTIONS ENTIRELY AS IT CONTAINS IMPORTANT INFORMATION.  If you were prescribed a narcotic or controlled medication, do not drive or operate heavy equipment or machinery while taking these medications.  If you were prescribed antibiotics, please take them to completion.  You must understand that you've received an Urgent Care treatment only and that you may be released before all your medical problems are known or treated. You, the patient, will arrange for follow up care as instructed.    Tylenol up to 4,000 mg a day is safe for short periods and can be used for body aches, pain, and fever. However in high doses and prolonged use it can cause liver irritation.    Ibuprofen is a non-steroidal anti-inflammatory that can be used for body aches, pain, and fever.However it can also cause stomach irritation if over used.     Follow up with your PCP or specialty clinic as instructed in the next 2-3 days if not improved or as needed. You can call [phone] to schedule an appointment with appropriate provider.      If you condition worsens, we recommend that you receive another evaluation at the emergency room immediately or contact your primary medical clinic's after hours call service to discuss your concerns.      Please return here or go to the Emergency Department for any concerns or worsening condition.   You can also call [phone] to schedule an appointment with the appropriate provider.    Please return here or go to the Emergency Department for any concerns or worsening of condition.    Thank you for choosing [facility]!    Our goal in the Urgent Care is to always provide outstanding medical care. You may receive a survey by mail or e-mail in the next week regarding your experience today. We would greatly appreciate you completing and returning the survey. Your feedback provides us with a way to recognize our staff who provide very  good care, and it helps us learn how to improve when your experience was below our aspiration of excellence.      We appreciate you trusting us with your medical care. We hope you feel better soon. We will be happy to take care of you for all of your future medical needs.    Sincerely,    JUAN Posadas      Back Pain Discharge Instructions    Alternate heat and ice for first 48 hours then  apply heat. You may do gently stretching if tolerable.    Moist warm compresss to area several times daily.  May use a heating pad on LOW to provide heat over a towel which was dampended with warm water. DO NOT FALL ASLEEP WITH HEATING PAD ON.  Do not stay in one position to long.  When sleeping on your back place a pillow under knees to reduce tension on back.  If sleeping on your side, place pillow between knees to keep spine in better alinement.  Wear supportive shoes such as tennis shoes for support of the lower back.  Take any medication as directed.    If you were not prescribed an anti-inflammatory medication, and if you do not have any history of stomach/intestinal ulcers, or kidney disease, or are not taking a blood thinner such as Coumadin, Plavix, Pradaxa, Eloquis, or Xaralta for example, it is OK to take over the counter Ibuprofen or Advil or Motrin or Aleve as directed.  Do not take these medications on an empty stomach.    If you were prescribed a narcotic medication, do not drive or operate heavy equipment or machinery while taking these medications.    If you lose control of your bowel and/or bladder; lose sensation in between your legs by your genitalia and/or rectum or  lose control or sensation of any extremity, please go to the nearest Emergency Department immediately.

## 2025-05-12 ENCOUNTER — TELEPHONE (OUTPATIENT)
Dept: PRIMARY CARE CLINIC | Facility: CLINIC | Age: 89
End: 2025-05-12
Payer: MEDICARE

## 2025-05-12 ENCOUNTER — PATIENT MESSAGE (OUTPATIENT)
Dept: PRIMARY CARE CLINIC | Facility: CLINIC | Age: 89
End: 2025-05-12
Payer: MEDICARE

## 2025-05-19 ENCOUNTER — CLINICAL SUPPORT (OUTPATIENT)
Dept: REHABILITATION | Facility: HOSPITAL | Age: 89
End: 2025-05-19
Payer: MEDICARE

## 2025-05-19 DIAGNOSIS — Z74.09 IMPAIRED FUNCTIONAL MOBILITY, BALANCE, AND ENDURANCE: Primary | ICD-10-CM

## 2025-05-19 PROCEDURE — 97112 NEUROMUSCULAR REEDUCATION: CPT

## 2025-05-19 PROCEDURE — 97530 THERAPEUTIC ACTIVITIES: CPT

## 2025-05-19 PROCEDURE — 97110 THERAPEUTIC EXERCISES: CPT

## 2025-05-19 NOTE — ED PROVIDER NOTES
Trev Vasques,            During your visit with Dr. Mckee you discussed having a surgery. For this surgery we require you to read and complete the highlighted sections pages 3-6 of this form. Once you have completed this form please mail back to the clinic or drop the consent forms off at the clinic. (If you are receiving this message via [employer] or in the letter tab on [employer] you will be getting a packet in the mail in a couple of days.)  If you have any questions or concerns please call [phone].           Thank you,  Your Urology Care Team             Encounter Date: 2018    SCRIBE #1 NOTE: I, Jose Nicole, am scribing for, and in the presence of,  Dr. Suárez. I have scribed the following portions of the note - Other sections scribed: Partial MDM .       History     Chief Complaint   Patient presents with    Animal Bite     pt bit her for the second time to her right arm x 12 hours ago. pt expresses pain 7/10. Pt denies numbness, tingling, or fever.      Courtney Plummer is a 82 y.o. female with PMH anxiety, HTN, and kidney stones presents with right forearm pain after being bit by her cat last night.  Cat is up to date on vaccines.  The cat bit her on the radial aspect of right forearm.  She noticed an immediate pain at the time.  No numbness/paresthesias.    She was bit by the same cat in 10/2016.  At that time she did not seek care immediately until she had severe cellulitis that required admission for IV abx.          Review of patient's allergies indicates:   Allergen Reactions    Sulfa (sulfonamide antibiotics) Anaphylaxis    Codeine Anxiety     Past Medical History:   Diagnosis Date    Anxiety     Essential hypertension 2018    Hypertension     Kidney stones 12/3/2015     Past Surgical History:   Procedure Laterality Date    BACK SURGERY      x3     SECTION, CLASSIC       last     SPINE SURGERY      THYROID SURGERY       Family History   Problem Relation Age of Onset    Heart disease Father     Heart disease Brother     Cancer Brother      melanoma    No Known Problems Daughter     No Known Problems Son     No Known Problems Daughter     No Known Problems Daughter     No Known Problems Son     No Known Problems Son     Breast cancer Neg Hx     Colon cancer Neg Hx     Ovarian cancer Neg Hx      Social History   Substance Use Topics    Smoking status: Former Smoker     Packs/day: 1.00     Quit date: 1969    Smokeless tobacco: Never Used    Alcohol use No      Comment: occasionally     Review of  Systems   Constitutional: Negative for activity change, chills, diaphoresis, fatigue and fever.   HENT: Negative.    Eyes: Negative.    Respiratory: Negative for cough, chest tightness and shortness of breath.    Cardiovascular: Negative for chest pain and palpitations.   Gastrointestinal: Negative for abdominal pain, constipation, diarrhea, nausea and vomiting.   Genitourinary: Negative.    Musculoskeletal: Positive for myalgias (right forearm). Negative for arthralgias, back pain, gait problem, joint swelling, neck pain and neck stiffness.   Skin: Positive for wound (2 puncture wounds to right forearm). Negative for color change, pallor and rash.   Neurological: Negative for dizziness, weakness, light-headedness, numbness and headaches.       Physical Exam     Initial Vitals [02/18/18 0650]   BP Pulse Resp Temp SpO2   (!) 144/70 80 20 98.4 °F (36.9 °C) 99 %      MAP       94.67         Physical Exam    Nursing note and vitals reviewed.  Constitutional: She appears well-developed and well-nourished. She is not diaphoretic. No distress.   HENT:   Head: Normocephalic and atraumatic.   Eyes: EOM are normal. Pupils are equal, round, and reactive to light.   Neck: Normal range of motion.   Cardiovascular: Normal rate and intact distal pulses.   Pulmonary/Chest: No respiratory distress.   Musculoskeletal: Normal range of motion. She exhibits no edema or tenderness.   Neurological: She is alert and oriented to person, place, and time. She has normal strength and normal reflexes.   Skin: Skin is warm. Capillary refill takes less than 2 seconds. No abscess noted. No erythema.   2 small puncture wounds to radial aspect of distal right forearm with minimal soft tissue swelling; no induration, no fluctuance   Psychiatric: She has a normal mood and affect. Her behavior is normal. Judgment and thought content normal.         ED Course   Procedures  Labs Reviewed - No data to display          Medical Decision Making:   History:    Old Medical Records: I decided to obtain old medical records.  Clinical Tests:   Radiological Study: Ordered and Reviewed       APC / Resident Notes:   Courtney Plummer is a 82 y.o. female with cat bite to right forearm.  Will obtain xrays to eval for retained foreign body.  Will give IM unasyn and discharge on augmentin if no foreign body.       Scribe Attestation:   Scribe #1: I performed the above scribed service and the documentation accurately describes the services I performed. I attest to the accuracy of the note.    Attending Attestation:   Physician Attestation Statement for Resident:  As the supervising MD   Physician Attestation Statement: I have personally seen and examined this patient.   I agree with the above history. -: 83 yo f, healthy, cat bite to R forearm last night.  No fever/chills/systemic sx but + worsening arm pain since injury.  On exam, small area cellulitis surrounding bite.  No prox streaking.  Discussed admit vs d/c home for pt.  Pt strongly prefers to do trial of PO abx.  Will give 1 dose IV unasyn and then d/c home w augmentin.  Pt given strict return precautions   As the supervising MD I agree with the above PE.    As the supervising MD I agree with the above treatment, course, plan, and disposition.  I have reviewed and agree with the residents interpretation of the following: x-rays.  I have reviewed the following: old records at this facility.                       Clinical Impression:   The encounter diagnosis was Cat bite, initial encounter.                           Sonal Suárez MD  02/20/18 1450

## 2025-05-19 NOTE — PROGRESS NOTES
"  Outpatient Rehab    Physical Therapy Progress Note    Patient Name: Courtney Plummer  MRN: [id number]  YOB: 1935  Encounter Date: 5/19/2025    Therapy Diagnosis:   Encounter Diagnosis   Name Primary?    Impaired functional mobility, balance, and endurance Yes     Physician: John Mitchell MD    Physician Orders: Eval and Treat  Medical Diagnosis: Balance disorder    Visit # / Visits Authorized:  2 / 10  Insurance Authorization Period: 4/9/2025 to 7/10/2025  Date of Evaluation: 4/9/2025  Plan of Care Certification: 4/19/2025 to 6/14/2025      PT/PTA: PT   Number of PTA visits since last PT visit:0  Time In: 1615   Time Out: 1700  Total Time (in minutes): 45   Total Billable Time (in minutes): 45      Precautions:     Standard, Falls      Subjective   Had an episode of back pain several days after last visit that has since resolved..  Pain reported as 0/10. NA    Objective         Sit to Stand Testing      The patient completed 13 repetitions of a sit to stand transfer in 30 seconds. no UE support              Treatment:  Therapeutic Exercise  TE 1: x 8 mins, sci-fit recumbent stepper, level 2, twin peaks, BLE only  Balance/Neuromuscular Re-Education  NMR 1: alternating toe taps [id number] to green cone  NMR 2: 2 x 30" static stance NBOS on airex, eyes closed, horizontal and vertical head turns  NMR 3: 2 x 30" modified single limb stance on small blue dynadisc, ea , no UE support  NMR 4: 2 X 30" tandem stance no ue support  Therapeutic Activity  TA 2: x6 laps, stepping over hurdles lateral  TA 4: x10 ea leg step up to 6 in step with toe tap on 2nd step  TA 6: Hallway walking with horizontal and vertical head turns 2 laps each      Time Entry(in minutes):  Neuromuscular Re-Education Time Entry: 20  Therapeutic Activity Time Entry: 15  Therapeutic Exercise Time Entry: 10    Assessment & Plan   Assessment: Courtney has made great progress so far despite having minimal sessions. She demonstrates " decreased UE support reliance during static balance. Slight difficulty with sally stepping but was able to correct her LOB. She would continue to benefit form skilled therapy interventions to improve balance and decrease falls risk  Evaluation/Treatment Tolerance: Patient tolerated treatment well    Patient will continue to benefit from skilled outpatient physical therapy to address the deficits listed in the problem list box on initial evaluation, provide pt/family education and to maximize pt's level of independence in the home and community environment.     Patient's spiritual, cultural, and educational needs considered and patient agreeable to plan of care and goals.           Plan: continue to progress per patient tolerance static and dynamic balance    Goals:   Active       LTG       Pt to improve 5 x sit to stand to </=10 seconds to demonstrate improved functional strength endurance and speed       Start:  04/19/25    Expected End:  06/14/25            Pt to improve time on condition 4 by >/= 12 seconds on condition 4 of mCTSB to demonstrate improved static balance       Start:  04/19/25    Expected End:  06/14/25            Pt to perform 6MWT and 10MWT and have appropriate goals determined off respective score       Start:  04/19/25    Expected End:  06/14/25               STG       Pt to demonstrate independence with HEP (Progressing)       Start:  04/19/25    Expected End:  05/17/25            Pt to improve BL hip extension strength by 1/2 grade on MMT to demonstrate improved LE strengthening       Start:  04/19/25    Expected End:  05/17/25            Pt to improve 30 second sit to stand to >/= 13 repetitions to demonstrate improved functional strength endurance (Met)       Start:  04/19/25    Expected End:  05/17/25    Resolved:  05/19/25         Pt to improve time on condition 4 by >/= 5 seconds on condition 4 of mCTSB to demonstrate improved static balance       Start:  04/19/25    Expected End:   05/17/25                Jewels Cedillo, PT

## 2025-05-26 ENCOUNTER — CLINICAL SUPPORT (OUTPATIENT)
Dept: REHABILITATION | Facility: HOSPITAL | Age: 89
End: 2025-05-26
Payer: MEDICARE

## 2025-05-26 DIAGNOSIS — Z74.09 IMPAIRED FUNCTIONAL MOBILITY, BALANCE, AND ENDURANCE: Primary | ICD-10-CM

## 2025-05-26 PROCEDURE — 97110 THERAPEUTIC EXERCISES: CPT

## 2025-05-26 PROCEDURE — 97112 NEUROMUSCULAR REEDUCATION: CPT

## 2025-05-26 PROCEDURE — 97530 THERAPEUTIC ACTIVITIES: CPT

## 2025-05-26 NOTE — PROGRESS NOTES
"  Outpatient Rehab    Physical Therapy Visit    Patient Name: Courtney Plummer  MRN: [id number]  YOB: 1935  Encounter Date: 5/26/2025    Therapy Diagnosis:   Encounter Diagnosis   Name Primary?    Impaired functional mobility, balance, and endurance Yes     Physician: John Mitchell MD    Physician Orders: Eval and Treat  Medical Diagnosis: Balance disorder    Visit # / Visits Authorized:  3 / 10  Insurance Authorization Period: 4/9/2025 to 7/10/2025  Date of Evaluation: 4/9/2025  Plan of Care Certification: 4/19/2025 to 6/14/2025      PT/PTA: PT   Number of PTA visits since last PT visit:0  Time In: 1613   Time Out: 1658  Total Time (in minutes): 45   Total Billable Time (in minutes): 45      Precautions:     Standard, Falls      Subjective   back is feeling better.  Pain reported as 0/10. NA    Objective            Treatment:  Therapeutic Exercise  TE 1: x 10 minutes recumbent stepper level 4  Balance/Neuromuscular Re-Education  NMR 2: 2 x 30" static stance NBOS on airex, eyes closed, horizontal and vertical head turns  NMR 3: 2 x 30" modified single limb stance on small blue dynadisc, ea , no UE support  NMR 4: 6 laps in // bars tandem walking with touchdown support  Therapeutic Activity  TA 1: 2x10 sit to stand with foam fitter under feet  TA 4: x10 ea leg step up to 6 in step with toe tap on 2nd step  TA 5: 50 feet walking backwards SPV  TA 6: Hallway walking with horizontal and vertical head turns 2 laps each    Time Entry(in minutes):  Neuromuscular Re-Education Time Entry: 20  Therapeutic Activity Time Entry: 15  Therapeutic Exercise Time Entry: 10    Assessment & Plan   Assessment: Courtney tolerated session well. Continues to require occasional UE support especially with dynamic balance. Occasionally has some episodes of anxiety about a task but is able to perform with light amounts of UE support or CGA. Would benefit from continued skilled therapy to improve balance and decrease falls " risk  Evaluation/Treatment Tolerance: Patient tolerated treatment well    The patient will continue to benefit from skilled outpatient physical therapy in order to address the deficits listed in the problem list on the initial evaluation, provide patient and family education, and maximize the patients level of independence in the home and community environments.     The patient's spiritual, cultural, and educational needs were considered, and the patient is agreeable to the plan of care and goals.           Plan: continue to progress per patient tolerance static and dynamic balance    Goals:   Active       LTG       Pt to improve 5 x sit to stand to </=10 seconds to demonstrate improved functional strength endurance and speed       Start:  04/19/25    Expected End:  06/14/25            Pt to improve time on condition 4 by >/= 12 seconds on condition 4 of mCTSB to demonstrate improved static balance       Start:  04/19/25    Expected End:  06/14/25            Pt to perform 6MWT and 10MWT and have appropriate goals determined off respective score       Start:  04/19/25    Expected End:  06/14/25               STG       Pt to demonstrate independence with HEP (Progressing)       Start:  04/19/25    Expected End:  05/17/25            Pt to improve BL hip extension strength by 1/2 grade on MMT to demonstrate improved LE strengthening       Start:  04/19/25    Expected End:  05/17/25            Pt to improve 30 second sit to stand to >/= 13 repetitions to demonstrate improved functional strength endurance (Met)       Start:  04/19/25    Expected End:  05/17/25    Resolved:  05/19/25         Pt to improve time on condition 4 by >/= 5 seconds on condition 4 of mCTSB to demonstrate improved static balance       Start:  04/19/25    Expected End:  05/17/25                Jewels Cedillo, PT

## 2025-06-02 ENCOUNTER — CLINICAL SUPPORT (OUTPATIENT)
Dept: REHABILITATION | Facility: HOSPITAL | Age: 89
End: 2025-06-02
Payer: MEDICARE

## 2025-06-02 DIAGNOSIS — Z74.09 IMPAIRED FUNCTIONAL MOBILITY, BALANCE, AND ENDURANCE: Primary | ICD-10-CM

## 2025-06-02 PROCEDURE — 97110 THERAPEUTIC EXERCISES: CPT

## 2025-06-02 PROCEDURE — 97112 NEUROMUSCULAR REEDUCATION: CPT

## 2025-06-06 ENCOUNTER — HOSPITAL ENCOUNTER (OUTPATIENT)
Dept: RADIOLOGY | Facility: HOSPITAL | Age: 89
Discharge: HOME | End: 2025-06-06
Attending: INTERNAL MEDICINE
Payer: MEDICARE

## 2025-06-06 ENCOUNTER — RESULTS FOLLOW-UP (OUTPATIENT)
Dept: PRIMARY CARE CLINIC | Facility: CLINIC | Age: 89
End: 2025-06-06

## 2025-06-06 DIAGNOSIS — M81.0 OSTEOPOROSIS, UNSPECIFIED OSTEOPOROSIS TYPE, UNSPECIFIED PATHOLOGICAL FRACTURE PRESENCE: ICD-10-CM

## 2025-06-06 PROCEDURE — 77080 DXA BONE DENSITY AXIAL: CPT | Mod: TC

## 2025-06-06 PROCEDURE — 77080 DXA BONE DENSITY AXIAL: CPT | Mod: 26,,, | Performed by: INTERNAL MEDICINE

## 2025-06-12 ENCOUNTER — TELEPHONE (OUTPATIENT)
Dept: PRIMARY CARE CLINIC | Facility: CLINIC | Age: 89
End: 2025-06-12
Payer: MEDICARE

## 2025-06-12 NOTE — TELEPHONE ENCOUNTER
----- Message from John Mitchell MD sent at 6/11/2025  4:40 PM CDT -----  Would recommend continuing fosamax and repeating the bone density test in 2 years.  ----- Message -----  From: Interface, Rad Results In  Sent: 6/11/2025  10:10 AM CDT  To: John Mitchell MD

## 2025-06-14 ENCOUNTER — PATIENT MESSAGE (OUTPATIENT)
Dept: ADMINISTRATIVE | Facility: OTHER | Age: 89
End: 2025-06-14
Payer: MEDICARE

## 2025-07-22 ENCOUNTER — OFFICE VISIT (OUTPATIENT)
Dept: URGENT CARE | Facility: CLINIC | Age: 89
End: 2025-07-22
Payer: MEDICARE

## 2025-07-22 VITALS
TEMPERATURE: 99 F | OXYGEN SATURATION: 97 % | RESPIRATION RATE: 20 BRPM | DIASTOLIC BLOOD PRESSURE: 83 MMHG | BODY MASS INDEX: 22.47 KG/M2 | WEIGHT: 119 LBS | HEIGHT: 61 IN | SYSTOLIC BLOOD PRESSURE: 153 MMHG | HEART RATE: 77 BPM

## 2025-07-22 DIAGNOSIS — R05.9 COUGH, UNSPECIFIED TYPE: ICD-10-CM

## 2025-07-22 DIAGNOSIS — J04.0 LARYNGITIS: ICD-10-CM

## 2025-07-22 DIAGNOSIS — J02.9 SORE THROAT: Primary | ICD-10-CM

## 2025-07-22 LAB
CTP QC/QA: YES
SARS-COV+SARS-COV-2 AG RESP QL IA.RAPID: NEGATIVE

## 2025-07-22 RX ORDER — GUAIFENESIN 600 MG/1
1200 TABLET, EXTENDED RELEASE ORAL 2 TIMES DAILY
Qty: 14 TABLET | Refills: 0 | Status: SHIPPED | OUTPATIENT
Start: 2025-07-22

## 2025-07-22 RX ORDER — DEXAMETHASONE SODIUM PHOSPHATE 10 MG/ML
5 INJECTION INTRAMUSCULAR; INTRAVENOUS ONCE
Status: DISCONTINUED | OUTPATIENT
Start: 2025-07-22 | End: 2025-07-22

## 2025-07-22 RX ORDER — BENZONATATE 100 MG/1
100 CAPSULE ORAL 3 TIMES DAILY PRN
Qty: 30 CAPSULE | Refills: 0 | Status: SHIPPED | OUTPATIENT
Start: 2025-07-22 | End: 2025-08-01

## 2025-07-22 RX ORDER — DEXAMETHASONE SODIUM PHOSPHATE 10 MG/ML
5 INJECTION INTRAMUSCULAR; INTRAVENOUS ONCE
Status: COMPLETED | OUTPATIENT
Start: 2025-07-22 | End: 2025-07-22

## 2025-07-22 RX ADMIN — DEXAMETHASONE SODIUM PHOSPHATE 5 MG: 10 INJECTION INTRAMUSCULAR; INTRAVENOUS at 04:07

## 2025-07-22 NOTE — PROGRESS NOTES
"Subjective:      Patient ID: Courtney Plummer is a 89 y.o. female.    Vitals:  height is 5' 1" (1.549 m) and weight is 54 kg (119 lb). Her oral temperature is 98.6 °F (37 °C). Her blood pressure is 153/83 (abnormal) and her pulse is 77. Her respiration is 20 and oxygen saturation is 97%.     Chief Complaint: Sore Throat    This is a 89 y.o. female who presents today with a chief complaint of sore throat that began 4 days ago.  Pt reports a few of her friends did test COVID+      Sore Throat   This is a new problem. The current episode started in the past 7 days. The problem has been gradually worsening. Neither side of throat is experiencing more pain than the other. The pain is at a severity of 0/10. The patient is experiencing no pain. Associated symptoms include congestion, a hoarse voice, swollen glands and trouble swallowing. Pertinent negatives include no abdominal pain, coughing, diarrhea, drooling, ear discharge, ear pain, headaches, plugged ear sensation, neck pain, shortness of breath, stridor or vomiting. She has had no exposure to strep or mono. She has tried nothing for the symptoms. The treatment provided no relief.       HENT:  Positive for congestion, sore throat and trouble swallowing. Negative for ear pain, ear discharge and drooling.    Neck: Negative for neck pain.   Respiratory:  Negative for cough, shortness of breath and stridor.    Gastrointestinal:  Negative for abdominal pain, vomiting and diarrhea.   Skin:  Negative for erythema.   Neurological:  Negative for headaches.      Objective:     Physical Exam   Constitutional: She is oriented to person, place, and time. She does not appear ill. No distress.      Comments:Pt voice very hoarse for 24hrs now   normal  HENT:   Head: Normocephalic and atraumatic.   Ears:   Right Ear: Tympanic membrane, external ear and ear canal normal.   Left Ear: Tympanic membrane, external ear and ear canal normal.   Nose: No rhinorrhea or congestion. "   Mouth/Throat: Mucous membranes are moist. No oropharyngeal exudate or posterior oropharyngeal erythema. Oropharynx is clear.   Eyes: Conjunctivae are normal. Pupils are equal, round, and reactive to light. Extraocular movement intact   Neck: Neck supple.   Cardiovascular: Normal rate, regular rhythm, normal heart sounds and normal pulses.   No murmur heard.  Pulmonary/Chest: Effort normal and breath sounds normal. No respiratory distress.   Abdominal: Normal appearance and bowel sounds are normal. She exhibits no distension. Soft. flat abdomen   Musculoskeletal: Normal range of motion.         General: Normal range of motion.   Neurological: no focal deficit. She is alert, oriented to person, place, and time and at baseline.   Skin: Skin is warm and dry. Capillary refill takes less than 2 seconds. No erythema   Psychiatric: Her behavior is normal. Mood, judgment and thought content normal.   Nursing note and vitals reviewed.    Assessment:     Plan:   1. Sore throat  - SARS Coronavirus 2 Antigen, POCT Manual Read    2. Laryngitis  - guaiFENesin (MUCINEX) 600 mg 12 hr tablet; Take 2 tablets (1,200 mg total) by mouth 2 (two) times daily.  Dispense: 14 tablet; Refill: 0  - dexAMETHasone injection 5 mg    3. Cough, unspecified type  - benzonatate (TESSALON) 100 MG capsule; Take 1 capsule (100 mg total) by mouth 3 (three) times daily as needed.  Dispense: 30 capsule; Refill: 0   All results discussed with pt prior to discharge from clinic

## 2025-07-23 ENCOUNTER — OFFICE VISIT (OUTPATIENT)
Dept: INTERNAL MEDICINE | Facility: CLINIC | Age: 89
End: 2025-07-23
Payer: MEDICARE

## 2025-07-23 ENCOUNTER — PATIENT MESSAGE (OUTPATIENT)
Dept: PRIMARY CARE CLINIC | Facility: CLINIC | Age: 89
End: 2025-07-23
Payer: MEDICARE

## 2025-07-23 VITALS
DIASTOLIC BLOOD PRESSURE: 60 MMHG | OXYGEN SATURATION: 98 % | HEART RATE: 85 BPM | BODY MASS INDEX: 23.22 KG/M2 | SYSTOLIC BLOOD PRESSURE: 133 MMHG | WEIGHT: 123 LBS | HEIGHT: 61 IN

## 2025-07-23 DIAGNOSIS — R49.0 HOARSENESS OF VOICE: ICD-10-CM

## 2025-07-23 DIAGNOSIS — J04.0 LARYNGITIS: Primary | ICD-10-CM

## 2025-07-23 PROCEDURE — 1159F MED LIST DOCD IN RCRD: CPT | Mod: CPTII,HCNC,S$GLB, | Performed by: FAMILY MEDICINE

## 2025-07-23 PROCEDURE — 99213 OFFICE O/P EST LOW 20 MIN: CPT | Mod: GE,HCNC,S$GLB, | Performed by: FAMILY MEDICINE

## 2025-07-23 PROCEDURE — 1126F AMNT PAIN NOTED NONE PRSNT: CPT | Mod: CPTII,HCNC,S$GLB, | Performed by: FAMILY MEDICINE

## 2025-07-23 PROCEDURE — 99999 PR PBB SHADOW E&M-EST. PATIENT-LVL V: CPT | Mod: PBBFAC,HCNC,GC,

## 2025-07-23 PROCEDURE — 3288F FALL RISK ASSESSMENT DOCD: CPT | Mod: CPTII,HCNC,S$GLB, | Performed by: FAMILY MEDICINE

## 2025-07-23 PROCEDURE — 1101F PT FALLS ASSESS-DOCD LE1/YR: CPT | Mod: CPTII,HCNC,S$GLB, | Performed by: FAMILY MEDICINE

## 2025-07-23 PROCEDURE — 1160F RVW MEDS BY RX/DR IN RCRD: CPT | Mod: CPTII,HCNC,S$GLB, | Performed by: FAMILY MEDICINE

## 2025-07-23 NOTE — TELEPHONE ENCOUNTER
LOV with John Mitchell MD , 5/1/2025 - anxiety, osteoporosis, foot tingling  7/22/25 Urgent care visit for sore throat - benzonatate and guafinesin prescribed.

## 2025-07-23 NOTE — PATIENT INSTRUCTIONS
Some commonly used home remedies for hoarseness are honey, herbal teas, and warm fluids.     Concerning signs to call the doctors:    If you feel more short of breath or wheezing while on your daily walks.    If you noticed productive sputum (phlegm that is green/yellow)    If you are having immense fevers, chills, and generalized weakness.

## 2025-07-23 NOTE — PROGRESS NOTES
[facility]  CLINIC NOTE    Name: Courtney Plummer  : 1935  Date of Service: 2025   PCP: John Mitchell MD    PRESENTING HISTORY       History of Present Illness:  Ms. Courtney Plummer is a 89 y.o. female with a medical history of:     Hypertension, anxiety presenting here for urgent care follow-up.     Went to urgent Care on  for a sore throat that began over the weekend and now has developed hoarseness.  She was negative for COVID and was diagnosed with laryngitis.  She was given a dexamethasone injection along with Mucinex/Tessalon Perles.  She had message her PCP that 1 of the medications causes dizziness and requesting other medications. She has not taken her medications due to worry of the side effects.  She lives at home however has multiple social support with friends and family.  She stills walks 30 minutes daily along with going to social events with her friends with no limitations.          Review of Systems:   Constitution: Negative for fever, chills, generalized fatigue  HENT: Negative rhinorrhea, or headache.  Positive for hoarseness  Cardiovascular: Negative for chest pain, leg swelling, palpitations  Pulmonary: Negative for SOB, positive for cough   Neurological: Negative for focal weakness or sensory changes.    Labs:     Lab Results   Component Value Date     2025     12/10/2024    K 4.6 2025    K 4.7 12/10/2024     2025     12/10/2024    CO2 24 2025    CO2 23 12/10/2024    BUN 14 2025    CREATININE 0.7 2025    ANIONGAP 9 2025     Lab Results   Component Value Date    BILITOT 0.8 2025    BILITOT 0.7 12/10/2024    AST 17 2025    AST 18 12/10/2024    ALT 12 2025    ALT 13 12/10/2024    ALKPHOS 48 2025    ALKPHOS 47 12/10/2024     Lab Results   Component Value Date    HGBA1C 5.1 2025    HGBA1C 5.0 12/10/2024                                                Lab  "Results   Component Value Date    WBC 5.49 2025    HGB 11.3 (L) 2025    HGB 11.0 (L) 12/10/2024    HCT 34.8 (L) 2025    HCT 34.1 (L) 12/10/2024    HCT 40 2023     2025     12/10/2024    GRAN 2.2 12/10/2024    GRAN 48.0 12/10/2024     Lab Results   Component Value Date    CHOL 187 2025    CHOL 176 12/10/2024    HDL 74 2025    LDLCALC 104.0 2025    TRIG 45 2025    TRIG 53 12/10/2024      Lab Results   Component Value Date    TSH 0.202 (L) 2025    TSH 0.617 12/10/2024     No results found for: "PSA"         PAST HISTORY:     Past Medical History:   Diagnosis Date    Anxiety     Essential hypertension 2018    Graves disease     Hypertension     Kidney stones 12/3/2015       Past Surgical History:   Procedure Laterality Date    BACK SURGERY      x3     SECTION, CLASSIC       last     COSMETIC SURGERY      EYE SURGERY  2019    Catatact    FRACTURE SURGERY      compression fracture    SPINE SURGERY      THYROID SURGERY         Family History   Problem Relation Name Age of Onset    Heart disease Father Elroy Alejon     Heart disease Brother Elroy     Cancer Brother Elroy         Melanoma    Depression Mother Ramya     No Known Problems Daughter      No Known Problems Son      No Known Problems Daughter      No Known Problems Daughter      No Known Problems Son      No Known Problems Son      Alcohol abuse Brother Angel     Hearing loss Brother Angel         I also wear nearing aids    Depression Sister Lisy     Breast cancer Neg Hx      Colon cancer Neg Hx      Ovarian cancer Neg Hx         Social History     Socioeconomic History    Marital status:    Tobacco Use    Smoking status: Former     Current packs/day: 0.00     Average packs/day: 1 pack/day for 15.0 years (15.0 ttl pk-yrs)     Types: Cigarettes     Start date: 1954     Quit date: 1969     Years since quittin.5     Passive exposure: " Never    Smokeless tobacco: Never   Substance and Sexual Activity    Alcohol use: Yes     Alcohol/week: 5.0 standard drinks of alcohol     Types: 4 Glasses of wine, 1 Drinks containing 0.5 oz of alcohol per week     Comment: occasionally    Drug use: Never    Sexual activity: Not Currently     Partners: Male     Birth control/protection: Other-see comments     Comment: No longer necessary   Social History Narrative    November 11, 2014    She is adusting to being .    2 of her children have sided with her ex-.    She has no contact with her ex-husba.She has an xcellent relationship with her othe 4 children.    She is at a Bible stud once a week.    For 35 years she has played bridge every other week with a group o 8 lady's.    She also plays serious bridge she once weekly at the [facility] on [place]    She is now living inher own place.  It is located behind [facility] on [address].  It is called [employer]. Every night about 15-16 people gather for [employer].  She does nt drink.    Ever sinceher fall on January 9, 2013 she has had difficuly recovering.    Slowly, now she is getting epifanio on her feet     Social Drivers of Health     Financial Resource Strain: Low Risk  (2/24/2025)    Overall Financial Resource Strain (CARDIA)     Difficulty of Paying Living Expenses: Not hard at all   Food Insecurity: No Food Insecurity (2/24/2025)    Hunger Vital Sign     Worried About Running Out of Food in the Last Year: Never true     Ran Out of Food in the Last Year: Never true   Transportation Needs: No Transportation Needs (2/24/2025)    PRAPARE - Transportation     Lack of Transportation (Medical): No     Lack of Transportation (Non-Medical): No   Physical Activity: Sufficiently Active (2/24/2025)    Exercise Vital Sign     Days of Exercise per Week: 5 days     Minutes of Exercise per Session: 30 min   Stress: Stress Concern Present (2/24/2025)    [employer] -  "Occupational Stress Questionnaire     Feeling of Stress : To some extent   Housing Stability: Low Risk  (2/24/2025)    Housing Stability Vital Sign     Unable to Pay for Housing in the Last Year: No     Number of Times Moved in the Last Year: 0     Homeless in the Last Year: No       MEDICATIONS & ALLERGIES:     Current Outpatient Medications on File Prior to Visit   Medication Sig    alendronate (FOSAMAX) 70 MG tablet TAKE 1 TABLET EVERY 7 DAYS. TAKE WITH LARGE GLASS OF WATER. DO NOT LIE FLAT FOR 2 HOURS AFTER TAKING MED    benzonatate (TESSALON) 100 MG capsule Take 1 capsule (100 mg total) by mouth 3 (three) times daily as needed.    biotin 5,000 mcg Subl Take 10,000 mcg by mouth once daily. 10,000mcg    busPIRone (BUSPAR) 5 MG Tab Take 1 tablet (5 mg total) by mouth 2 (two) times daily as needed (anxiety).    COLLAGEN MISC Take 20 g by mouth once daily.    cyanocobalamin (VITAMIN B-12) 1000 MCG tablet Take 2,500 mcg by mouth once daily.    guaiFENesin (MUCINEX) 600 mg 12 hr tablet Take 2 tablets (1,200 mg total) by mouth 2 (two) times daily.    lisinopriL 10 MG tablet Take 1 tablet (10 mg total) by mouth once daily.    mv-min/iron/folic/calcium/vitK (WOMEN'S MULTIVITAMIN ORAL) Take by mouth once a week. 3 days a week    VITAMIN D3 1,000 unit capsule Take by mouth once daily. 50 mcg    vitamin E, dl,tocopheryl acet, (VITAMIN E, DL, ACETATE,) 180 mg (400 unit) Cap once daily.     Current Facility-Administered Medications on File Prior to Visit   Medication    [COMPLETED] dexAMETHasone injection 5 mg    [DISCONTINUED] dexAMETHasone injection 5 mg       Review of patient's allergies indicates:   Allergen Reactions    Sulfa (sulfonamide antibiotics) Anaphylaxis    Macrobid [nitrofurantoin monohyd/m-cryst]      Fever, joint pain    Codeine Anxiety       OBJECTIVE:   Vital Signs:  Vitals:    07/23/25 1524 07/23/25 1525   BP: (!) 150/65 133/60   Pulse: 85    SpO2: 98%    Weight: 55.8 kg (123 lb 0.3 oz)    Height: 5' 1" " (1.549 m)        Recent Results (from the past 24 hours)   SARS Coronavirus 2 Antigen, POCT Manual Read    Collection Time: 07/22/25  4:36 PM   Result Value Ref Range    SARS Coronavirus 2 Antigen Negative Negative, Presumptive Negative     Acceptable Yes         Gen/Constitutional: No acute distress  Head: Normocephalic, Atraumatic  Neck: supple, no masses or LAD, no JVD  Eyes:conjunctiva clear  Ears, Nose and Throat: No rhinorrhea  Throat:  Positive for cobblestoning, non erythematous, no purulence  Cardiac:  Regular rate, Reg Rhythm, No murmur  Pulmonary: CTA Bilat, no wheezes, rhonchi, rales.  No increased work of breathing.  GI: Abdomen soft, non-tender, non-distended; no rebound or guarding  : No CVA tenderness.  Musculoskeletal: Extremities warm, well perfused, no erythema, no edema  Skin: No rashes, cyanosis or jaundice.  Neuro: Alert and Oriented x 3; No focal motor or sensory deficits.    Psych: Normal affect      ASSESSMENT & PLAN:     1. Laryngitis    2. Hoarseness of voice      89-year-old presenting here for a re-evaluation of viral laryngitis and requesting other remedies and questioning for concerning red flag symptoms.  Upon evaluation, she is overall doing well still performing her ADLs along with social activities.  Offered her to trial antitussive medications however decline due to concern of side effects and not wanting to take many medications.  Told her that these are supportive treatments.  Return precautions were given and educated the importance of returning to clinic if she develops worsening fevers, chills, shortness breast/wheezes for evaluation of possible bacterial infection/pneumonia.  However at this time, recommending OTC supportive treatment with home remedies of honey, herbal teas, warm water.  She was in agreement with this plan.    Discussed with GRACIELA Bhatia     RTC prn      Orlando Yi,    Internal Medicine PGY-3  [facility]      Some  commonly used home remedies for cough are honey, herbal teas, and warm fluids.

## 2025-07-28 ENCOUNTER — DOCUMENTATION ONLY (OUTPATIENT)
Dept: REHABILITATION | Facility: HOSPITAL | Age: 89
End: 2025-07-28
Payer: MEDICARE

## 2025-07-28 NOTE — PROGRESS NOTES
Patient was evaluated on 2025 and was seen 4 times for physical therapy. Patient has not attended physical therapy since 2025. Patient given home exercise program. Plan of care and/or authorization . Current status is unknown. Patient to be discharged at this time.     Jewels Cedillo, PT, DPT